# Patient Record
Sex: MALE | Race: WHITE | Employment: FULL TIME | URBAN - METROPOLITAN AREA
[De-identification: names, ages, dates, MRNs, and addresses within clinical notes are randomized per-mention and may not be internally consistent; named-entity substitution may affect disease eponyms.]

---

## 2018-09-29 ENCOUNTER — OFFICE VISIT (OUTPATIENT)
Dept: FAMILY MEDICINE CLINIC | Facility: CLINIC | Age: 38
End: 2018-09-29
Payer: COMMERCIAL

## 2018-09-29 ENCOUNTER — LAB ENCOUNTER (OUTPATIENT)
Dept: LAB | Age: 38
End: 2018-09-29
Attending: FAMILY MEDICINE
Payer: COMMERCIAL

## 2018-09-29 VITALS
RESPIRATION RATE: 16 BRPM | SYSTOLIC BLOOD PRESSURE: 108 MMHG | WEIGHT: 163 LBS | TEMPERATURE: 98 F | HEART RATE: 76 BPM | OXYGEN SATURATION: 99 % | DIASTOLIC BLOOD PRESSURE: 70 MMHG | HEIGHT: 67 IN | BODY MASS INDEX: 25.58 KG/M2

## 2018-09-29 DIAGNOSIS — Z71.85 VACCINE COUNSELING: ICD-10-CM

## 2018-09-29 DIAGNOSIS — Z13.220 LIPID SCREENING: ICD-10-CM

## 2018-09-29 DIAGNOSIS — Z76.89 ENCOUNTER TO ESTABLISH CARE: ICD-10-CM

## 2018-09-29 DIAGNOSIS — Z00.00 LABORATORY TESTS ORDERED AS PART OF A COMPLETE PHYSICAL EXAM (CPE): ICD-10-CM

## 2018-09-29 DIAGNOSIS — Z86.69 HX OF HYDROCEPHALUS: ICD-10-CM

## 2018-09-29 DIAGNOSIS — Z00.00 WELL ADULT EXAM: Primary | ICD-10-CM

## 2018-09-29 DIAGNOSIS — Z71.82 EXERCISE COUNSELING: ICD-10-CM

## 2018-09-29 DIAGNOSIS — Z84.0: ICD-10-CM

## 2018-09-29 DIAGNOSIS — Z13.39 ALCOHOL SCREENING: ICD-10-CM

## 2018-09-29 DIAGNOSIS — Z80.8 FAMILY HISTORY OF SKIN CANCER: ICD-10-CM

## 2018-09-29 PROCEDURE — 85025 COMPLETE CBC W/AUTO DIFF WBC: CPT | Performed by: FAMILY MEDICINE

## 2018-09-29 PROCEDURE — 80061 LIPID PANEL: CPT | Performed by: FAMILY MEDICINE

## 2018-09-29 PROCEDURE — 36415 COLL VENOUS BLD VENIPUNCTURE: CPT | Performed by: FAMILY MEDICINE

## 2018-09-29 PROCEDURE — 99385 PREV VISIT NEW AGE 18-39: CPT | Performed by: FAMILY MEDICINE

## 2018-09-29 PROCEDURE — 80053 COMPREHEN METABOLIC PANEL: CPT | Performed by: FAMILY MEDICINE

## 2018-09-29 RX ORDER — CHLORAL HYDRATE 500 MG
1000 CAPSULE ORAL DAILY
COMMUNITY

## 2018-09-29 NOTE — PROGRESS NOTES
Patient presents with:  Complete Form: complete boimetric screening form, pt had flu shot already at work  [address]      HPI:  New patient here to establish care. No acute complaints today     PMHx: hx of hydrocephalus diagnosed at 5 year of age.  He IMPLANT SHUNT      Comment:  HYDROCEPHALUS SHUNTED TWICE  1996: RECONSTR NOSE  Family History   Problem Relation Age of Onset   • No Known Problems Father    • No Known Problems Mother    • Cancer Maternal Grandmother         SKIN CANCER   • Alcohol and Ot normal. Non tender, no masses, no organomegaly or hernias. Musculoskeletal: Normal range of motion. No edema and no tenderness. No effusions. Lymphadenopathy: No cervical adenopathy. Neurological: Normal reflexes.  No cranial nerve deficit or sensory d

## 2019-10-01 ENCOUNTER — LAB ENCOUNTER (OUTPATIENT)
Dept: LAB | Age: 39
End: 2019-10-01
Attending: FAMILY MEDICINE
Payer: COMMERCIAL

## 2019-10-01 ENCOUNTER — OFFICE VISIT (OUTPATIENT)
Dept: FAMILY MEDICINE CLINIC | Facility: CLINIC | Age: 39
End: 2019-10-01
Payer: COMMERCIAL

## 2019-10-01 VITALS
DIASTOLIC BLOOD PRESSURE: 76 MMHG | RESPIRATION RATE: 16 BRPM | SYSTOLIC BLOOD PRESSURE: 118 MMHG | OXYGEN SATURATION: 99 % | BODY MASS INDEX: 26.37 KG/M2 | WEIGHT: 168 LBS | HEIGHT: 67 IN | TEMPERATURE: 98 F | HEART RATE: 82 BPM

## 2019-10-01 DIAGNOSIS — M77.12 LATERAL EPICONDYLITIS OF LEFT ELBOW: ICD-10-CM

## 2019-10-01 DIAGNOSIS — Z13.31 DEPRESSION SCREENING: ICD-10-CM

## 2019-10-01 DIAGNOSIS — Z00.00 ROUTINE GENERAL MEDICAL EXAMINATION AT A HEALTH CARE FACILITY: ICD-10-CM

## 2019-10-01 DIAGNOSIS — Z71.82 EXERCISE COUNSELING: ICD-10-CM

## 2019-10-01 DIAGNOSIS — Z00.00 WELL ADULT EXAM: Primary | ICD-10-CM

## 2019-10-01 DIAGNOSIS — H61.22 LEFT EAR IMPACTED CERUMEN: ICD-10-CM

## 2019-10-01 PROCEDURE — 99395 PREV VISIT EST AGE 18-39: CPT | Performed by: FAMILY MEDICINE

## 2019-10-01 PROCEDURE — 36415 COLL VENOUS BLD VENIPUNCTURE: CPT | Performed by: FAMILY MEDICINE

## 2019-10-01 PROCEDURE — 99213 OFFICE O/P EST LOW 20 MIN: CPT | Performed by: FAMILY MEDICINE

## 2019-10-01 PROCEDURE — 80061 LIPID PANEL: CPT | Performed by: FAMILY MEDICINE

## 2019-10-01 PROCEDURE — 85025 COMPLETE CBC W/AUTO DIFF WBC: CPT | Performed by: FAMILY MEDICINE

## 2019-10-01 PROCEDURE — 80053 COMPREHEN METABOLIC PANEL: CPT | Performed by: FAMILY MEDICINE

## 2019-10-01 NOTE — PROGRESS NOTES
Patient presents with:  Physical: Routine annual physical with fasting labs due  Complete Form: Biometric screening form  Imm/Inj: Tdap UTD and Flu shot completed on 9/10/19 at [employer]      HPI:    Sees dr. Cristian Savage for family hx of skin cancer.      Lef Hematological: Negative for adenopathy. Does not bruise/bleed easily. Psychiatric/Behavioral: The patient is not nervous/anxious. No depression.     Patient Active Problem List:     Hx of hydrocephalus     Family history of skin cancer      Past Medical H Constitutional: Oriented to person, place, and time. No distress. HEENT:  Normocephalic and atraumatic. Hearing and tympanic membranes normal of right ear. Left ear with cerumen impaction. Nose normal. Oropharynx is clear and moist.   Eyes: Conjunctivae - left epicondylitis: recommended RICE, application of Brace, rest, discussed other treatment options of PT, referral to ortho for possible steroid injection. Patient will try RICE, nsaids, brace. F/u in 2 weeks for irrigation and elbow assessment.    - as   All men in this age group At routine exams   Diabetes mellitus, type 2 All men who have no symptoms but are overweight or obese and have 1 or more other risk factors for diabetes At least every 3 years (yearly if blood sugar has already started to rise) Measles, mumps, rubella (MMR) All men in this age group who have no record of these infections or vaccines 1 or 2 doses through age 54   Meningococcal Men at increased risk for infection – talk with your healthcare provider 1 or more doses   Pneumococca (P Tendons are strong bands of tissue that connect muscles to bones. Lateral epicondylitis affects the tendons that connect muscles in the forearm to the lateral epicondyle. This is the bony knob on the outer side of the elbow.  The condition occurs if the ext · Taking pain medicines. Taking prescription or over-the-counter pain medicines may help reduce pain and swelling.    · Wearing a brace. This helps reduce strain on the muscles and tendons in the forearm, which may relieve symptoms.  It is very important to Wearing a tennis elbow splint allows the inflamed tendon to rest. It must be worn properly. It should be placed down the arm past the painful area of the elbow.  If it is directly over the inflamed tendon, it can worsen the symptoms. This brace can help the

## 2019-10-01 NOTE — PATIENT INSTRUCTIONS
Prevention Guidelines, Men Ages 25 to 44  Screening tests and vaccines are an important part of managing your health. A screening test is done to find possible disorders or diseases in people who don't have any symptoms.  The goal is to find a disease ear Vaccines Who needs it How often   Chickenpox (varicella) All men in this age group who have no record of this infection or vaccine 2 doses; the second dose should be given at least 4 weeks after the first dose   Hepatitis A Men at increased risk for infect Sexually transmitted infection prevention Men who are sexually active At routine exams   Skin cancer Prevention of skin cancer in fair-skinned adults through age 25 At routine exams   1Those who are 25years of age, who are not up-to-date on their childhoo · Resting the elbow, forearm, and wrist. You’ll need to avoid movements that can make your symptoms worse. You also may need to avoid certain sports and types of work for a time. This helps relieve symptoms and prevent further damage to the tendons.   · Ezzard Blowers © 4850-7982 [employer] [address]. [address] [facility] [place] [place]. All rights reserved. This information is not intended as a substitute for professional medical care. Always follow your healthcare professional's instructions.         Luis

## 2019-10-15 ENCOUNTER — OFFICE VISIT (OUTPATIENT)
Dept: FAMILY MEDICINE CLINIC | Facility: CLINIC | Age: 39
End: 2019-10-15
Payer: COMMERCIAL

## 2019-10-15 VITALS
SYSTOLIC BLOOD PRESSURE: 112 MMHG | HEART RATE: 65 BPM | TEMPERATURE: 98 F | OXYGEN SATURATION: 99 % | DIASTOLIC BLOOD PRESSURE: 64 MMHG | WEIGHT: 168 LBS | BODY MASS INDEX: 26.37 KG/M2 | HEIGHT: 67 IN | RESPIRATION RATE: 16 BRPM

## 2019-10-15 DIAGNOSIS — H61.23 BILATERAL IMPACTED CERUMEN: Primary | ICD-10-CM

## 2019-10-15 PROCEDURE — 69210 REMOVE IMPACTED EAR WAX UNI: CPT | Performed by: FAMILY MEDICINE

## 2019-10-15 NOTE — PROGRESS NOTES
Cerumen Impaction    Reason(s) for procedure:  Bilateral impacted cerumen  (primary encounter diagnosis)    Ear Exam:  Canal:  Right cerumen impaction     Tympanic Membrane:  Left cerumen impaction    Visualized by: otoscope     Impacted Cerumen:  Haley Ba

## 2020-10-13 ENCOUNTER — OFFICE VISIT (OUTPATIENT)
Dept: FAMILY MEDICINE CLINIC | Facility: CLINIC | Age: 40
End: 2020-10-13
Payer: COMMERCIAL

## 2020-10-13 ENCOUNTER — LABORATORY ENCOUNTER (OUTPATIENT)
Dept: LAB | Age: 40
End: 2020-10-13
Attending: FAMILY MEDICINE
Payer: COMMERCIAL

## 2020-10-13 VITALS
BODY MASS INDEX: 26.68 KG/M2 | WEIGHT: 170 LBS | OXYGEN SATURATION: 99 % | HEART RATE: 88 BPM | TEMPERATURE: 98 F | SYSTOLIC BLOOD PRESSURE: 124 MMHG | RESPIRATION RATE: 16 BRPM | HEIGHT: 67 IN | DIASTOLIC BLOOD PRESSURE: 74 MMHG

## 2020-10-13 DIAGNOSIS — Z00.00 LABORATORY EXAM ORDERED AS PART OF ROUTINE GENERAL MEDICAL EXAMINATION: ICD-10-CM

## 2020-10-13 DIAGNOSIS — Z86.69 HX OF HYDROCEPHALUS: ICD-10-CM

## 2020-10-13 DIAGNOSIS — Z80.8 FAMILY HISTORY OF SKIN CANCER: ICD-10-CM

## 2020-10-13 DIAGNOSIS — L91.0 KELOID: ICD-10-CM

## 2020-10-13 DIAGNOSIS — Z13.31 DEPRESSION SCREENING: ICD-10-CM

## 2020-10-13 DIAGNOSIS — Z71.82 EXERCISE COUNSELING: ICD-10-CM

## 2020-10-13 DIAGNOSIS — Z00.00 WELL ADULT EXAM: Primary | ICD-10-CM

## 2020-10-13 DIAGNOSIS — Z23 NEED FOR INFLUENZA VACCINATION: ICD-10-CM

## 2020-10-13 PROCEDURE — 90471 IMMUNIZATION ADMIN: CPT | Performed by: FAMILY MEDICINE

## 2020-10-13 PROCEDURE — 99395 PREV VISIT EST AGE 18-39: CPT | Performed by: FAMILY MEDICINE

## 2020-10-13 PROCEDURE — 80050 GENERAL HEALTH PANEL: CPT | Performed by: FAMILY MEDICINE

## 2020-10-13 PROCEDURE — 80061 LIPID PANEL: CPT | Performed by: FAMILY MEDICINE

## 2020-10-13 PROCEDURE — 3074F SYST BP LT 130 MM HG: CPT | Performed by: FAMILY MEDICINE

## 2020-10-13 PROCEDURE — 3078F DIAST BP <80 MM HG: CPT | Performed by: FAMILY MEDICINE

## 2020-10-13 PROCEDURE — 90686 IIV4 VACC NO PRSV 0.5 ML IM: CPT | Performed by: FAMILY MEDICINE

## 2020-10-13 PROCEDURE — 36415 COLL VENOUS BLD VENIPUNCTURE: CPT | Performed by: FAMILY MEDICINE

## 2020-10-13 PROCEDURE — 84439 ASSAY OF FREE THYROXINE: CPT | Performed by: FAMILY MEDICINE

## 2020-10-13 PROCEDURE — 3008F BODY MASS INDEX DOCD: CPT | Performed by: FAMILY MEDICINE

## 2020-10-13 NOTE — PROGRESS NOTES
Patient presents with:  Physical: Routine annual wellness exam.  Immunization/Injection: Flu shot- consent signed- Tdap was 1/1/2021      HPI:    Sees dr. Adeel Voss for family hx of skin cancer mother and sister had basal cell.  He had a biopsy of skin mole skin cancer     Lateral epicondylitis of left elbow     Left ear impacted cerumen      Past Medical History:   Diagnosis Date   • Hydrocephalus ([facility] [address] 1983     Past Surgical History:   Procedure Laterality Date   • HC IMPLANT SHUNT      HYDROCEPHALUS SHUNTE normal. Oropharynx is clear and moist.   Eyes: Conjunctivae and EOM are normal. PERRLA. No scleral icterus. Neck: Normal range of motion. Neck supple. Normal carotid pulses and no JVD present. No mass and no thyromegaly present.    Cardiovascular: Normal 0, done 10/13/2020              Exercise counseling  Recommended 150 minutes of exercise per week.      Hx of hydrocephalus    Family history of skin cancer  - DERM - INTERNAL    Keloid  - DERM - INTERNAL      Orders Placed This Encounter      MARIA DE JESUS Harper increased risk At routine exams   High cholesterol or triglycerides All men ages 28 and older, and younger men at high risk for coronary artery disease At least every 5 years   HIV All men At routine exams   Obesity All men in this age group At routine exa dose ages 23 to 72 (protects against 13 types of pneumococcal bacteria)  PPSV23: 1 to 2 doses through age 59, or 1 dose at 72 or older (protects against 23 types of pneumococcal bacteria)    Tetanus/diphtheria/pertussis (Td/Tdap) booster All men in this ag

## 2021-07-06 ENCOUNTER — TELEPHONE (OUTPATIENT)
Dept: SURGERY | Facility: CLINIC | Age: 41
End: 2021-07-06

## 2021-07-06 NOTE — TELEPHONE ENCOUNTER
Patient is scheduled for NP Consult on 7/20 @ 8 am for Cervical cyst. Patient states his dermatologist is wanting him to be seen by Neurosurgeon prior to having cyst removed due to cyst being located on shunt patient had placed as a child.  Patient would li

## 2021-07-07 NOTE — TELEPHONE ENCOUNTER
[facility] dermatology Dr Koki Mclean. Called patient and informed him of message below. Cancelled appointment for 7-20.  He requested we fax something his dermatologist/plastic surgeon Dr Koki Mclean with [facility] stating he needs a CT of head and neck to be reviewed

## 2021-07-07 NOTE — TELEPHONE ENCOUNTER
Discussed with Dr. Peyton Lorenzana. Per Dr. Peyton Lorenzana, no need for office visit. Recommends the dermatologist order a CT scan of the head and cervical spine. Dr. Peyton Lorenzana will review once completed.   If the cyst is away from the shunt catheter, Dr. Peyton Lorenzana to provide valente

## 2021-07-21 NOTE — TELEPHONE ENCOUNTER
Patient calling in stating the dermatologist he is seeing did not receive the letter that was sent on 7/7/21. Patient is requesting Anais Schulte or nurse reach out to the office, [facility], directly at [phone].

## 2021-07-21 NOTE — TELEPHONE ENCOUNTER
Called Dr. Nicholas Bain office, with provided number, to inform of letter content and recommendations: that pt to be ordered CT of head and neck by Dr. Keira Salinas and once completed Dr. Matty Soto will review and give clearance based on results.      Was informed by mary ellen

## 2021-08-28 ENCOUNTER — HOSPITAL ENCOUNTER (OUTPATIENT)
Dept: CT IMAGING | Age: 41
Discharge: HOME | End: 2021-08-28
Attending: SURGERY
Payer: COMMERCIAL

## 2021-08-28 DIAGNOSIS — L72.8 OTHER FOLLICULAR CYSTS OF THE SKIN AND SUBCUTANEOUS TISSUE: ICD-10-CM

## 2021-08-28 DIAGNOSIS — T85.09XA: ICD-10-CM

## 2021-08-28 LAB — CREAT BLD-MCNC: 1.1 MG/DL

## 2021-08-28 PROCEDURE — 70470 CT HEAD/BRAIN W/O & W/DYE: CPT | Performed by: SURGERY

## 2021-08-28 PROCEDURE — 70492 CT SFT TSUE NCK W/O & W/DYE: CPT | Performed by: SURGERY

## 2021-08-28 PROCEDURE — 82565 ASSAY OF CREATININE: CPT

## 2021-09-03 NOTE — TELEPHONE ENCOUNTER
Pt called to let Dr. Ferny Booker know he has completed the CT scans and they are in his chart ready for review. Please see TE below.

## 2021-09-15 NOTE — TELEPHONE ENCOUNTER
Dr Solomon Ojeda would like to speak to Dr Herminia Ulloa by Friday.   He has some questions re: patient.  [phone]

## 2021-09-20 ENCOUNTER — TELEMEDICINE (OUTPATIENT)
Dept: FAMILY MEDICINE CLINIC | Facility: CLINIC | Age: 41
End: 2021-09-20

## 2021-09-20 DIAGNOSIS — J06.9 VIRAL URI WITH COUGH: ICD-10-CM

## 2021-09-20 DIAGNOSIS — J40 BRONCHITIS: Primary | ICD-10-CM

## 2021-09-20 PROCEDURE — 99213 OFFICE O/P EST LOW 20 MIN: CPT | Performed by: FAMILY MEDICINE

## 2021-09-20 RX ORDER — METHYLPREDNISOLONE 4 MG/1
TABLET ORAL
Qty: 21 EACH | Refills: 0 | Status: SHIPPED | OUTPATIENT
Start: 2021-09-20 | End: 2021-10-07

## 2021-09-20 NOTE — PROGRESS NOTES
Subjective    This visit is conducted using Telemedicine with live, interactive video and audio.     Chief Complaint:  Patient presents with:  Cough        The patient confirmed knowledge of the limitations of the use of telemedicine were verbally confirm no       HISTORY:  Past Medical History:   Diagnosis Date   • Hydrocephalus ([facility] [address] 1983      Past Surgical History:   Procedure Laterality Date   • HC IMPLANT SHUNT      HYDROCEPHALUS SHUNTED TWICE   • RECONSTR NOSE  1996      Family History   Problem Relat 4 MG Oral Tablet Therapy Pack; As directed. Dispense: 21 each; Refill: 0    Diagnostic rationale, follow up instructions, and strict precautions/indications for emergent direct evaluation were discussed with the patient.  The patient agrees with the plan,

## 2021-09-21 NOTE — PATIENT INSTRUCTIONS
Acute Bronchitis  Your healthcare provider has told you that you have acute bronchitis. Bronchitis is infection or inflammation of the airways in the lungs (bronchial tubes). Normally, air moves easily in and out of the airways.  Bronchitis narrows the ai help relieve inflammation in your bronchial tubes. They can also thin mucus. This makes it easier to cough up. Your healthcare provider may prescribe an inhaler to help open up the bronchial tubes.  Most of the time, acute bronchitis is caused by a viral in symptoms  · Breathing not getting better with treatments  · Symptoms that don’t start to get better in 1 week  Claudio last reviewed this educational content on 8/1/2019 © 2000-2021 [employer] [address]. All rights reserved.  This information is not

## 2021-09-28 NOTE — TELEPHONE ENCOUNTER
Pt calling again to find out Dr Miguelito Harris recommendations re: cyst removal.  Pt is frustrated he hasn't heard back yet.  Pls call pt to advise

## 2021-09-29 NOTE — TELEPHONE ENCOUNTER
Dr. Tere Telles will need to review imaging and call patient with results if he can proceed with procedure with dermatology. STEVE nursing, please provider remind to Dr. Tere Telles when he is in clinic tomorrow. Will route high priority to Dr. Tere Telles.

## 2021-09-29 NOTE — TELEPHONE ENCOUNTER
S:  Received a call from Dr. Junior Cisse [facility] stating patient would like an appointment with Dr. Padmaja Beebe, not a PA, to go over films of CT. PT hoping by having Moisés Chow call if will help get him seen sooner by Dr. Padmaja Beebe.       B: Dr. Padmaja Beebe cons

## 2021-09-29 NOTE — TELEPHONE ENCOUNTER
Per Romel Limon., APN  \"Attempted to call patient but went to voice mail.  If patient calls back, please let him know Dr. Harjit Hyman is not in the office today. Aris Isaacs is back tomorrow so will discuss and call the patient then. \"

## 2021-09-30 NOTE — TELEPHONE ENCOUNTER
RN from Dr Melody Albarran office calling again requesting an update on clearance for derm procedure. Relayed messages below, that Dr Sulma Samano is aware and will review the imaging today while in clinic. We will call them once we get a response.        If staff reaches

## 2021-10-04 NOTE — TELEPHONE ENCOUNTER
Mr. Melodie Dunne calling for an update regarding information below. Informed Soco Otto, as of this moment we do not have any new or additional information. Pt is upset and would like to speak to management regarding this situation, prefers to speak to them now.

## 2021-10-05 ENCOUNTER — TELEPHONE (OUTPATIENT)
Dept: SURGERY | Facility: CLINIC | Age: 41
End: 2021-10-05

## 2021-10-05 DIAGNOSIS — Z98.2 S/P VP SHUNT: Primary | ICD-10-CM

## 2021-10-05 DIAGNOSIS — L72.9 FOLLICULAR CYST OF THE SKIN AND SUBCUTANEOUS TISSUE, UNSPECIFIED: ICD-10-CM

## 2021-10-05 NOTE — TELEPHONE ENCOUNTER
Pt's mother calling. She states they were just on phone with Dr Saul Salinas and he asked pt to do something and now he is feeling awful. They would like to speak to Dr Saul Salinas again, if possible.   Pls call [phone]

## 2021-10-05 NOTE — TELEPHONE ENCOUNTER
Informed Dr. Sarai Keith of update. Per Dr. Sarai Keith, Nursing to call patient and offer an appointment to be seen this Thursday, 10/7/21 at [facility] at 0900. Called patient who agreed to that date and time for an OV.   Informed patient that STEVE PSR will call tomorrow t

## 2021-10-05 NOTE — TELEPHONE ENCOUNTER
Neurological Surgery  Patient Phone Call    Khoa Zeng  10/5/2021    Diagnosis: Right sternocleidomastoid area cyst.  History of multiple  shunts during childhood.     Chief complaint: Relapsing right neck subcutaneous cyst.    History: Patient w and neck show multiple shunt catheters as described in the radiology report. I do not have a formal shunt x-ray series to be certain of which shunts are discontinuous but he has 4 catheters in his head.   A left parietal subdural catheter appears to be probably require exploration with neurosurgery and plastic surgery combined. Plan: I have ordered a formal shunt x-ray series to see his catheters more clearly than on the CT scan. I will see him in the office.   I have asked the plastic surgeon Dr. Mirtha Hudson

## 2021-10-05 NOTE — TELEPHONE ENCOUNTER
S:  Patient's inquiry was noted. B:  Dr. Conrad Gonzalez conducted a virtual visit with patient today.   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 Conrad Gonzalez who asked Nursing

## 2021-10-06 ENCOUNTER — HOSPITAL ENCOUNTER (OUTPATIENT)
Dept: GENERAL RADIOLOGY | Age: 41
Discharge: HOME | End: 2021-10-06
Attending: NEUROLOGICAL SURGERY
Payer: COMMERCIAL

## 2021-10-06 ENCOUNTER — TELEPHONE (OUTPATIENT)
Dept: SURGERY | Facility: CLINIC | Age: 41
End: 2021-10-06

## 2021-10-06 DIAGNOSIS — Z98.2 S/P VP SHUNT: ICD-10-CM

## 2021-10-06 DIAGNOSIS — L72.9 FOLLICULAR CYST OF THE SKIN AND SUBCUTANEOUS TISSUE, UNSPECIFIED: ICD-10-CM

## 2021-10-06 PROCEDURE — 70360 X-RAY EXAM OF NECK: CPT | Performed by: NEUROLOGICAL SURGERY

## 2021-10-06 PROCEDURE — 70250 X-RAY EXAM OF SKULL: CPT | Performed by: NEUROLOGICAL SURGERY

## 2021-10-06 PROCEDURE — 74018 RADEX ABDOMEN 1 VIEW: CPT | Performed by: NEUROLOGICAL SURGERY

## 2021-10-06 PROCEDURE — 71045 X-RAY EXAM CHEST 1 VIEW: CPT | Performed by: NEUROLOGICAL SURGERY

## 2021-10-06 NOTE — TELEPHONE ENCOUNTER
Pt states he had xray completed this morning and he received the results via [employer]. He would like some assistance in understanding the results.   Pls call pt to discuss/advise

## 2021-10-06 NOTE — TELEPHONE ENCOUNTER
Confirmed appointment for tomorrow 10/7 with Dr Saul Salinas at [facility].  Pt confirmed.

## 2021-10-07 ENCOUNTER — PATIENT MESSAGE (OUTPATIENT)
Dept: SURGERY | Facility: CLINIC | Age: 41
End: 2021-10-07

## 2021-10-07 ENCOUNTER — OFFICE VISIT (OUTPATIENT)
Dept: SURGERY | Facility: CLINIC | Age: 41
End: 2021-10-07
Payer: COMMERCIAL

## 2021-10-07 VITALS
SYSTOLIC BLOOD PRESSURE: 108 MMHG | WEIGHT: 170 LBS | BODY MASS INDEX: 26.68 KG/M2 | DIASTOLIC BLOOD PRESSURE: 66 MMHG | HEIGHT: 67 IN

## 2021-10-07 DIAGNOSIS — L72.9 FOLLICULAR CYST OF THE SKIN AND SUBCUTANEOUS TISSUE, UNSPECIFIED: ICD-10-CM

## 2021-10-07 DIAGNOSIS — Z86.69 HX OF HYDROCEPHALUS: ICD-10-CM

## 2021-10-07 DIAGNOSIS — Z98.2 S/P VP SHUNT: Primary | ICD-10-CM

## 2021-10-07 PROCEDURE — 3008F BODY MASS INDEX DOCD: CPT | Performed by: NEUROLOGICAL SURGERY

## 2021-10-07 PROCEDURE — 3074F SYST BP LT 130 MM HG: CPT | Performed by: NEUROLOGICAL SURGERY

## 2021-10-07 PROCEDURE — 99204 OFFICE O/P NEW MOD 45 MIN: CPT | Performed by: NEUROLOGICAL SURGERY

## 2021-10-07 PROCEDURE — 3078F DIAST BP <80 MM HG: CPT | Performed by: NEUROLOGICAL SURGERY

## 2021-10-07 NOTE — TELEPHONE ENCOUNTER
Pt messaging wanting clarification on plan of care that was established today at his appointment. Provider has not completed note and is seeing patients.        Will check back on status of office note at later time    Messaged pt back to inform

## 2021-10-07 NOTE — PROGRESS NOTES
Neurosurgery Consultation      REASON FOR CONSULT: History of multiple shunts since childhood. Development of a follicular cyst in the neck over one of the shunt tubes which has been chronically ulcerating.       HISTORY OF PRESENT ILLNESS: Sameer Catherine resection of the cyst.    Review of CT scan shows 4 separate intracranial catheters. 2 of them extend down the right neck into the region of the cystic area in the sternocleidomastoid muscle. One of the catheters at this location is discontinuous.   The o not refill promptly at all. Several minutes later during further questioning and exam it seems to feel again but then fails to resolve promptly.   In the right sternocleidomastoid area he has what looks almost like a mole and palpable thick tissue subcutan without contrast  August 28, 2021 CT scan soft tissue neck with and without contrast  October 6, 2021 shunt x-ray series  Based on CT scan and plain film review there are 4 intracranial catheters.   For review purposes I have numbered them in the order of a disconnected catheter and then down to the abdomen.   The system on the right side which has a pumping device that does not seem to empty and refill properly gives him a sense of tingling in the right sternocleidomastoid cyst in question, but this flushing

## 2021-10-07 NOTE — TELEPHONE ENCOUNTER
From: Alexus Busch  To: Cony Mendes MD  Sent: 10/7/2021 11:40 AM CDT  Subject: Follow up around 11/4    Hi Dr. Tere Escalante. Thank you for seeing me today. I’m reading the follow up visit details.  Does this 11/4 mean this will be right around when judy

## 2021-10-07 NOTE — PROGRESS NOTES
Had some xrays, thinks there might be cracked or broken    No pressure in head  Feels ok with balance has been running a couple of times per week    No falls recently    When after he spoke with you and he was pressing on the area, and hung up with you, he

## 2021-10-07 NOTE — PATIENT INSTRUCTIONS
Refill policies:    • Allow 2-3 business days for refills; controlled substances may take longer.   • Contact your pharmacy at least 5 days prior to running out of medication and have them send an electronic request or submit request through the “request re Depending on your insurance carrier, approval may take 3-10 days. It is highly recommended patients contact their insurance carrier directly to determine coverage.   If test is done without insurance authorization, patient may be responsible for the entire resection of skin follicular cyst.

## 2021-10-11 ENCOUNTER — TELEPHONE (OUTPATIENT)
Dept: SURGERY | Facility: CLINIC | Age: 41
End: 2021-10-11

## 2021-10-11 DIAGNOSIS — Z98.2 S/P VP SHUNT: Primary | ICD-10-CM

## 2021-10-11 NOTE — TELEPHONE ENCOUNTER
Pina Hinson from Dr Julieth Berman office calling to state she will put a hold on Dr Julieth Berman schedule for 11.8. 21.   Pls call Pina Hinson with any questions

## 2021-10-11 NOTE — TELEPHONE ENCOUNTER
Spoke to Cheo at Dr. Eileen Lee office and informed them we can place a hold date on Nov 8 for the joint case. I informed them we will confirm this date after the consult with Dr. Nagi White on 10/20.

## 2021-10-11 NOTE — TELEPHONE ENCOUNTER
Pt to receive surgery with Dr. Slava Linder     RN to coordinate sx date with Dr. Darci Chairez who will be on case    Once date given:  will request sx orders from provider   Confirm date with pt  Go over sx instructions     Pt aware of plan of care.      Called Dr. Abdon Page

## 2021-10-11 NOTE — TELEPHONE ENCOUNTER
Pt messaging via [facility] stating     \"Paco Resendez,     I didn’t hear back yet. Any word from Dr. Matty Soto? Also could you please let him know Geo Arita been having mild headaches since Tuesday. One from yesterday into this morning that was particularly bad.  Is that a si

## 2021-10-13 NOTE — TELEPHONE ENCOUNTER
Called and LM on personalized VM informing pt we are in the process of coordinating surgery with Dr. Janae Alcaraz. Nursing will call to discuss surgery instructions as soon as we have additional input. For future messages, see TE Schedule surgery.

## 2021-10-13 NOTE — TELEPHONE ENCOUNTER
Dr. James Sharif placed hold for 11/8/21 surgical date. Pt seeing Dr. Yuriy Saldaña on 10/20/21.     Will add to Dr. Josiah Marcelo list

## 2021-10-15 ENCOUNTER — OFFICE VISIT (OUTPATIENT)
Dept: FAMILY MEDICINE CLINIC | Facility: CLINIC | Age: 41
End: 2021-10-15
Payer: COMMERCIAL

## 2021-10-15 ENCOUNTER — LAB ENCOUNTER (OUTPATIENT)
Dept: LAB | Age: 41
End: 2021-10-15
Attending: FAMILY MEDICINE
Payer: COMMERCIAL

## 2021-10-15 VITALS
DIASTOLIC BLOOD PRESSURE: 64 MMHG | RESPIRATION RATE: 16 BRPM | HEIGHT: 66.5 IN | HEART RATE: 85 BPM | BODY MASS INDEX: 26.2 KG/M2 | SYSTOLIC BLOOD PRESSURE: 110 MMHG | TEMPERATURE: 98 F | OXYGEN SATURATION: 99 % | WEIGHT: 165 LBS

## 2021-10-15 DIAGNOSIS — F41.8 ANXIETY ABOUT HEALTH: ICD-10-CM

## 2021-10-15 DIAGNOSIS — T85.618S SHUNT MALFUNCTION, SEQUELA: ICD-10-CM

## 2021-10-15 DIAGNOSIS — Z23 NEED FOR VACCINATION: ICD-10-CM

## 2021-10-15 DIAGNOSIS — Z00.00 WELL ADULT EXAM: Primary | ICD-10-CM

## 2021-10-15 DIAGNOSIS — Z80.8 FAMILY HISTORY OF SKIN CANCER: ICD-10-CM

## 2021-10-15 DIAGNOSIS — Z00.00 WELL ADULT EXAM: ICD-10-CM

## 2021-10-15 DIAGNOSIS — Z86.69 HX OF HYDROCEPHALUS: ICD-10-CM

## 2021-10-15 PROCEDURE — 80050 GENERAL HEALTH PANEL: CPT | Performed by: FAMILY MEDICINE

## 2021-10-15 PROCEDURE — 99395 PREV VISIT EST AGE 18-39: CPT | Performed by: FAMILY MEDICINE

## 2021-10-15 PROCEDURE — 3074F SYST BP LT 130 MM HG: CPT | Performed by: FAMILY MEDICINE

## 2021-10-15 PROCEDURE — 3008F BODY MASS INDEX DOCD: CPT | Performed by: FAMILY MEDICINE

## 2021-10-15 PROCEDURE — 99213 OFFICE O/P EST LOW 20 MIN: CPT | Performed by: FAMILY MEDICINE

## 2021-10-15 PROCEDURE — 80061 LIPID PANEL: CPT | Performed by: FAMILY MEDICINE

## 2021-10-15 PROCEDURE — 3078F DIAST BP <80 MM HG: CPT | Performed by: FAMILY MEDICINE

## 2021-10-15 PROCEDURE — 90471 IMMUNIZATION ADMIN: CPT | Performed by: FAMILY MEDICINE

## 2021-10-15 PROCEDURE — 90686 IIV4 VACC NO PRSV 0.5 ML IM: CPT | Performed by: FAMILY MEDICINE

## 2021-10-15 RX ORDER — ALPRAZOLAM 0.5 MG/1
0.5 TABLET ORAL NIGHTLY PRN
Qty: 15 TABLET | Refills: 0 | Status: SHIPPED | OUTPATIENT
Start: 2021-10-15 | End: 2021-10-30

## 2021-10-15 NOTE — PROGRESS NOTES
Patient presents with: Annual: Routine physical, labs pended to [facility].  PHQ:0, CSSR:0  Immunization/Injection: Signed consent flu shot, immunizations UTD      HPI:   Here for physical.        Sees dr. Krystle Puentes for family hx of skin cancer mother and sis Skin:+ skin dimple right neck   Neurological: Negative for dizziness, syncope, weakness, numbness, tingling and headaches. Hematological: Negative for adenopathy. Does not bruise/bleed easily. Psychiatric/Behavioral: The patient is  nervous/anxious. (62033)                          10/13/2020  10/15/2021      FLUZONE 6 months and older PFS 0.5 ml (18792)                          09/16/2016  09/10/2019  10/13/2020      Fluarix 6 Months And Older 0.5 ml prefilled syringe (76712) benefits of flu vaccine   - IMMUNIZATION ADMINISTRATION  - FLULAVAL INFLUENZA VACCINE QUAD PRESERVATIVE FREE 0.5 ML    2. Well adult exam  - -Discussed   diet and exercise,  vaccine and screening guidelines. - CBC WITH DIFFERENTIAL WITH PLATELET;  Future it early enough to treat it most effectively. Screening tests are not used to diagnose. Instead, they are used to decide if more testing is needed. Health counseling is essential, too. Below are guidelines for these, for men ages 25 to 44.  Talk with your h first dose; the third dose should be given at least 2 months after the second dose and at least 4 months after the first dose    Haemophilus influenzae Type B (HIB) Men at increased risk for infection – talk with your healthcare provider 1 to 3 doses   Hum intended as a substitute for professional medical care. Always follow your healthcare professional's instructions. Anxiety Reaction  Anxiety is the feeling we all get when we think something bad might happen.  It is a normal response to stress and no can't be avoided. It is necessary to learn how to better manage stress. There are many proven methods that will reduce your anxiety.  These include simple things such as exercise, good nutrition, and adequate rest. Also, there are certain techniques that ar

## 2021-10-17 PROBLEM — T85.618A SHUNT MALFUNCTION: Status: ACTIVE | Noted: 2021-10-17

## 2021-10-17 PROBLEM — F41.8 ANXIETY ABOUT HEALTH: Status: ACTIVE | Noted: 2021-10-17

## 2021-10-17 PROBLEM — R45.89 ANXIETY ABOUT HEALTH: Status: ACTIVE | Noted: 2021-10-17

## 2021-10-17 NOTE — PATIENT INSTRUCTIONS
Prevention Guidelines, Men Ages 25 to 44  Screening tests and vaccines are an important part of managing your health. A screening test is done to find possible disorders or diseases in people who don't have any symptoms.  The goal is to find a disease ear vaccine 2 doses; the second dose should be given at least 4 weeks after the first dose   Hepatitis A Men at increased risk for infection – talk with your healthcare provider 2 doses given at least 6 months apart   Hepatitis B Men at increased risk for infe be reminded to avoid intentional tanning and tanning beds. 1Those who are 25years of age, who are not up-to-date on their childhood immunizations, should get all appropriate catch-up vaccines recommended by the CDC.    Claudio last reviewed this educat how your body reacts to stress. Learn to listen to your body signals. This will help you take action before the stress becomes severe. · When you can, do something about the source of your stress.  (Avoid hassles, limit the amount of change that happens in

## 2021-10-20 ENCOUNTER — PATIENT MESSAGE (OUTPATIENT)
Dept: FAMILY MEDICINE CLINIC | Facility: CLINIC | Age: 41
End: 2021-10-20

## 2021-10-20 ENCOUNTER — OFFICE VISIT (OUTPATIENT)
Dept: SURGERY | Facility: CLINIC | Age: 41
End: 2021-10-20
Payer: COMMERCIAL

## 2021-10-20 ENCOUNTER — TELEPHONE (OUTPATIENT)
Dept: SURGERY | Facility: CLINIC | Age: 41
End: 2021-10-20

## 2021-10-20 DIAGNOSIS — L90.5 SCAR OF NECK: Primary | ICD-10-CM

## 2021-10-20 DIAGNOSIS — T85.618A SHUNT MALFUNCTION, INITIAL ENCOUNTER: ICD-10-CM

## 2021-10-20 DIAGNOSIS — Z98.2 S/P VP SHUNT: Primary | ICD-10-CM

## 2021-10-20 DIAGNOSIS — L98.9 SKIN LESION OF NECK: ICD-10-CM

## 2021-10-20 PROCEDURE — 99243 OFF/OP CNSLTJ NEW/EST LOW 30: CPT | Performed by: SURGERY

## 2021-10-20 NOTE — TELEPHONE ENCOUNTER
Patient is scheduled for Ventriculoperitoneal Shunt Revision Distal Catheter only at Right Anterior Cervical Region  on 11/8/21 with Dr Christian Hassan and Dr. Kami Abel.     Y  form completed  Y Surgery order signed   Aleah Kent on surgery sheet  Y Placed on out

## 2021-10-20 NOTE — TELEPHONE ENCOUNTER
From: Paul Buckley  To: Ekaterina Rodriguez DO  Sent: 10/20/2021 10:18 AM CDT  Subject: Clearance for surgery    Hi Dr. Josemanuel Charles,     I just met with the plastic surgeon who would be handling the removal of the cyst on my neck.  Her PA, BODØ said Tomasa

## 2021-10-20 NOTE — TELEPHONE ENCOUNTER
Per pt PCP Dr. Delonte Deras 10/15/21    \"4. Shunt malfunction, sequela  - expected to have surgery in 2 weeks. Clinically stable.  \"

## 2021-10-20 NOTE — CONSULTS
New Patient Consultation    Chief Complaint:  No chief complaint on file.   R neck scar/ cyst    History of Present Illness:   Debra Villanueva is a 46year old male referred by Dr. Sarai Vázquez for combined surgery for his R neck cyst/ scar in the setting of s Use      Smoking status: Never Smoker      Smokeless tobacco: Never Used    Vaping Use      Vaping Use: Never used    Substance and Sexual Activity      Alcohol use: Yes      Drug use: No      Sexual activity: Yes        Partners: Female    Other Topics discharge. Skin:   The patient denies rash, itching, skin lesions, dry skin, change in skin color or change in moles, sunburns, or sunburns with blistering.    Hematologic/Lymphatic:  The patient denies easily bruising or bleeding, persistent swollen gland the scar tissue and excised the cyst on the outside.   I also discussed with him that we will take great care to protect the marginal mandibular nerve and therefore will use intraoperative nerve monitoring with the checkpoint nerve stimulator and if availab

## 2021-10-20 NOTE — PATIENT INSTRUCTIONS
Surgeon:              Dr. Min Morris. Amelia Whiteside, PhD                                          Tel:         [phone]                                  Fax:        [phone]    Surgery/Procedure:      Excision of right neck cyst, scar  Joint with Dr. Arjun Ferguson, 2. surgery.     Consent obtained for skin tumor extirpation  Photos taken on 10/20/2021

## 2021-10-20 NOTE — TELEPHONE ENCOUNTER
Labs completed 10/15/21  Patient had physical 10/15/21  Does patient need preop appt?  Please advise

## 2021-10-20 NOTE — TELEPHONE ENCOUNTER
You are scheduled for a  shunt revision on 11/8/21 with Dr. Tere Escalante and Dr. Temi Rosales. · You will need to contact the Pre-admission department at [phone] to schedule your pre-op testing.  Blood work needs to be done within 30 days of surgery and a COV week post op appointment is scheduled for 11/22/21 at 2:30pm with Sherryle Range, PA.     · Your 6 week post op appointment is scheduled for 12/30/21 at 2:45pm with Sherryle Range, PA (Dr. Keisha Mehta will be in on this visit)     Please visit the following website for

## 2021-10-21 DIAGNOSIS — L90.5 SCAR OF NECK: Primary | ICD-10-CM

## 2021-10-21 NOTE — TELEPHONE ENCOUNTER
[address] [facility]  [phone] spoke with Olivia Peres Call reference #[id number].  Time on call 14:31    Prior authorization request completed for: YEMI Webb revision   Authorization #NO AUTHORIZATION NEEDED FOR OUTPATIENT  Authorization dates: 11/8/21  BRITNEY

## 2021-10-22 ENCOUNTER — PATIENT MESSAGE (OUTPATIENT)
Dept: FAMILY MEDICINE CLINIC | Facility: CLINIC | Age: 41
End: 2021-10-22

## 2021-10-22 NOTE — TELEPHONE ENCOUNTER
From: Víctor Mejia  To: Nba Aburto DO  Sent: 10/20/2021 10:18 AM CDT  Subject: Clearance for surgery    Hi Dr. Hema Zeng,     I just met with the plastic surgeon who would be handling the removal of the cyst on my neck.  Her PADanita said Tomasa

## 2021-10-22 NOTE — TELEPHONE ENCOUNTER
Future Appointments   Date Time Provider Yeimy Danii   10/29/2021  8:00 AM Jo Johns LCSW [facility]   10/29/2021  9:30 AM Fernando Dickson DO [facility] [address] [facility] [address]   11/22/2021  2:30 PM JOSIANE Murrieta2 [facility]

## 2021-10-25 ENCOUNTER — PATIENT MESSAGE (OUTPATIENT)
Dept: SURGERY | Facility: CLINIC | Age: 41
End: 2021-10-25

## 2021-10-25 NOTE — TELEPHONE ENCOUNTER
Received [facility] message from Pt inquiring about pre-op instructions     Called pt who wanted to know if the labs he had completed at his wellness check up with his PCP on 10/15/21 was acceptable. Inform labs are but will need additional labs.  Informed pt of ne

## 2021-10-26 NOTE — TELEPHONE ENCOUNTER
From: Moira Eng  Sent: 10/25/2021 1:25 PM CDT  To: Rosy Sanders 2 Nurse  Subject: Pre-operative instructions     Paco Resendez,    Could I please have a call back to go over these instructions again?  I was out running errands the day I got a call with t

## 2021-10-27 ENCOUNTER — LABORATORY ENCOUNTER (OUTPATIENT)
Dept: LAB | Age: 41
End: 2021-10-27
Attending: NEUROLOGICAL SURGERY
Payer: COMMERCIAL

## 2021-10-27 DIAGNOSIS — Z98.2 S/P VP SHUNT: ICD-10-CM

## 2021-10-27 PROCEDURE — 87081 CULTURE SCREEN ONLY: CPT | Performed by: NEUROLOGICAL SURGERY

## 2021-10-27 PROCEDURE — 36415 COLL VENOUS BLD VENIPUNCTURE: CPT | Performed by: NEUROLOGICAL SURGERY

## 2021-10-27 PROCEDURE — 85610 PROTHROMBIN TIME: CPT | Performed by: NEUROLOGICAL SURGERY

## 2021-10-27 PROCEDURE — 85730 THROMBOPLASTIN TIME PARTIAL: CPT | Performed by: NEUROLOGICAL SURGERY

## 2021-10-28 ENCOUNTER — TELEPHONE (OUTPATIENT)
Dept: SURGERY | Facility: CLINIC | Age: 41
End: 2021-10-28

## 2021-10-28 NOTE — TELEPHONE ENCOUNTER
Received disc of 151 pages of medical records from [facility]  was sent to medical records.

## 2021-10-29 ENCOUNTER — OFFICE VISIT (OUTPATIENT)
Dept: FAMILY MEDICINE CLINIC | Facility: CLINIC | Age: 41
End: 2021-10-29
Payer: COMMERCIAL

## 2021-10-29 VITALS
HEART RATE: 80 BPM | WEIGHT: 166.81 LBS | BODY MASS INDEX: 26.49 KG/M2 | SYSTOLIC BLOOD PRESSURE: 114 MMHG | HEIGHT: 66.5 IN | OXYGEN SATURATION: 98 % | TEMPERATURE: 98 F | DIASTOLIC BLOOD PRESSURE: 72 MMHG | RESPIRATION RATE: 16 BRPM

## 2021-10-29 DIAGNOSIS — Z01.818 PREOP EXAMINATION: Primary | ICD-10-CM

## 2021-10-29 DIAGNOSIS — H60.541 ACUTE ECZEMATOID OTITIS EXTERNA OF RIGHT EAR: ICD-10-CM

## 2021-10-29 PROCEDURE — 3078F DIAST BP <80 MM HG: CPT | Performed by: FAMILY MEDICINE

## 2021-10-29 PROCEDURE — 3074F SYST BP LT 130 MM HG: CPT | Performed by: FAMILY MEDICINE

## 2021-10-29 PROCEDURE — 3008F BODY MASS INDEX DOCD: CPT | Performed by: FAMILY MEDICINE

## 2021-10-29 PROCEDURE — 99243 OFF/OP CNSLTJ NEW/EST LOW 30: CPT | Performed by: FAMILY MEDICINE

## 2021-10-29 RX ORDER — NEOMYCIN SULFATE, POLYMYXIN B SULFATE, HYDROCORTISONE 3.5; 10000; 1 MG/ML; [USP'U]/ML; MG/ML
1 SOLUTION/ DROPS AURICULAR (OTIC) 3 TIMES DAILY
Qty: 10 ML | Refills: 0 | Status: SHIPPED | OUTPATIENT
Start: 2021-10-29 | End: 2021-11-05

## 2021-10-29 NOTE — PROGRESS NOTES
Lyn Dacosta is a 46year old male.  Patient presents with:  Pre-Op Exam: Outpatient shunt revision and removal of cyst on 11/8/2021    HPI:   Kareem Da Silva presents for preoperative evaluation and clearance for VENTRICULOPERITONEAL SHUNT REVISION REVISION DI Alcohol and Other Disorders Associated Maternal Grandfather    • Cancer Paternal Grandmother         SKIN CANCER   • Hypertension Paternal Grandfather    • Alcohol and Other Disorders Associated Paternal Grandfather    • Stroke Paternal Messi Roman 09/16/2016  09/10/2019  10/13/2020      Fluarix 6 Months And Older 0.5 ml prefilled syringe (99866)                          09/10/2019      Fluvirin, 3 Years & >, Im                          10/23/2014      Influenza             10/15/2014  10/08/2015  10 Deep venous thrombosis, pulmonary embolus, bleeding, myocardial infarction, failure to relieve pain, wound infection, wound dehiscence, anesthesia complications, arrhythmia's etc.    Angelica Comber, DO        This note was prepared using [address] [facility] v

## 2021-10-31 PROBLEM — H60.541 ACUTE ECZEMATOID OTITIS EXTERNA OF RIGHT EAR: Status: ACTIVE | Noted: 2021-10-31

## 2021-11-05 ENCOUNTER — LAB ENCOUNTER (OUTPATIENT)
Dept: LAB | Age: 41
End: 2021-11-05
Attending: NEUROLOGICAL SURGERY
Payer: COMMERCIAL

## 2021-11-05 DIAGNOSIS — Z98.2 S/P VP SHUNT: ICD-10-CM

## 2021-11-05 NOTE — TELEPHONE ENCOUNTER
Received a call from OR  who requested clarification of \"bed status\" post operatively. Confirmed with Noemi that patient will be admitted post op. Case Change Request placed for patient to be classified as admit to inpatient.

## 2021-11-07 ENCOUNTER — ANESTHESIA EVENT (OUTPATIENT)
Dept: SURGERY | Facility: HOSPITAL | Age: 41
End: 2021-11-07
Payer: COMMERCIAL

## 2021-11-08 ENCOUNTER — ANESTHESIA (OUTPATIENT)
Dept: SURGERY | Facility: HOSPITAL | Age: 41
End: 2021-11-08
Payer: COMMERCIAL

## 2021-11-08 ENCOUNTER — HOSPITAL ENCOUNTER (OUTPATIENT)
Facility: HOSPITAL | Age: 41
Setting detail: HOSPITAL OUTPATIENT SURGERY
Discharge: HOME | End: 2021-11-08
Attending: NEUROLOGICAL SURGERY | Admitting: NEUROLOGICAL SURGERY
Payer: COMMERCIAL

## 2021-11-08 VITALS
OXYGEN SATURATION: 99 % | WEIGHT: 168 LBS | SYSTOLIC BLOOD PRESSURE: 116 MMHG | HEIGHT: 66.5 IN | RESPIRATION RATE: 18 BRPM | TEMPERATURE: 98 F | BODY MASS INDEX: 26.68 KG/M2 | DIASTOLIC BLOOD PRESSURE: 68 MMHG | HEART RATE: 69 BPM

## 2021-11-08 DIAGNOSIS — T85.618A SHUNT MALFUNCTION, INITIAL ENCOUNTER: ICD-10-CM

## 2021-11-08 DIAGNOSIS — Z98.2 S/P VP SHUNT: ICD-10-CM

## 2021-11-08 DIAGNOSIS — L98.9 SKIN LESION OF NECK: ICD-10-CM

## 2021-11-08 PROCEDURE — 0JQ40ZZ REPAIR RIGHT NECK SUBCUTANEOUS TISSUE AND FASCIA, OPEN APPROACH: ICD-10-PCS | Performed by: SURGERY

## 2021-11-08 PROCEDURE — 87206 SMEAR FLUORESCENT/ACID STAI: CPT | Performed by: NEUROLOGICAL SURGERY

## 2021-11-08 PROCEDURE — 87116 MYCOBACTERIA CULTURE: CPT | Performed by: NEUROLOGICAL SURGERY

## 2021-11-08 PROCEDURE — 87070 CULTURE OTHR SPECIMN AEROBIC: CPT | Performed by: NEUROLOGICAL SURGERY

## 2021-11-08 PROCEDURE — 89050 BODY FLUID CELL COUNT: CPT | Performed by: NEUROLOGICAL SURGERY

## 2021-11-08 PROCEDURE — 87075 CULTR BACTERIA EXCEPT BLOOD: CPT | Performed by: NEUROLOGICAL SURGERY

## 2021-11-08 PROCEDURE — 87076 CULTURE ANAEROBE IDENT EACH: CPT | Performed by: NEUROLOGICAL SURGERY

## 2021-11-08 PROCEDURE — 0JWT0JZ REVISION OF SYNTHETIC SUBSTITUTE IN TRUNK SUBCUTANEOUS TISSUE AND FASCIA, OPEN APPROACH: ICD-10-PCS | Performed by: NEUROLOGICAL SURGERY

## 2021-11-08 PROCEDURE — 88304 TISSUE EXAM BY PATHOLOGIST: CPT | Performed by: NEUROLOGICAL SURGERY

## 2021-11-08 PROCEDURE — 87176 TISSUE HOMOGENIZATION CULTR: CPT | Performed by: NEUROLOGICAL SURGERY

## 2021-11-08 PROCEDURE — 87205 SMEAR GRAM STAIN: CPT | Performed by: NEUROLOGICAL SURGERY

## 2021-11-08 PROCEDURE — 88305 TISSUE EXAM BY PATHOLOGIST: CPT | Performed by: NEUROLOGICAL SURGERY

## 2021-11-08 PROCEDURE — 0JB40ZZ EXCISION OF RIGHT NECK SUBCUTANEOUS TISSUE AND FASCIA, OPEN APPROACH: ICD-10-PCS | Performed by: SURGERY

## 2021-11-08 PROCEDURE — 87102 FUNGUS ISOLATION CULTURE: CPT | Performed by: NEUROLOGICAL SURGERY

## 2021-11-08 PROCEDURE — 88300 SURGICAL PATH GROSS: CPT | Performed by: NEUROLOGICAL SURGERY

## 2021-11-08 DEVICE — CODMAN® BACTISEAL® VENTRICULAR CATHETER WITH BACTISEAL SHUNT SYSTEM
Type: IMPLANTABLE DEVICE | Site: NECK | Status: FUNCTIONAL
Brand: CODMAN® BACTISEAL®

## 2021-11-08 RX ORDER — ONDANSETRON 2 MG/ML
INJECTION INTRAMUSCULAR; INTRAVENOUS AS NEEDED
Status: DISCONTINUED | OUTPATIENT
Start: 2021-11-08 | End: 2021-11-08 | Stop reason: SURG

## 2021-11-08 RX ORDER — DEXAMETHASONE SODIUM PHOSPHATE 4 MG/ML
VIAL (ML) INJECTION AS NEEDED
Status: DISCONTINUED | OUTPATIENT
Start: 2021-11-08 | End: 2021-11-08 | Stop reason: SURG

## 2021-11-08 RX ORDER — METOCLOPRAMIDE HYDROCHLORIDE 5 MG/ML
10 INJECTION INTRAMUSCULAR; INTRAVENOUS AS NEEDED
Status: DISCONTINUED | OUTPATIENT
Start: 2021-11-08 | End: 2021-11-08

## 2021-11-08 RX ORDER — MIDAZOLAM HYDROCHLORIDE 1 MG/ML
INJECTION INTRAMUSCULAR; INTRAVENOUS AS NEEDED
Status: DISCONTINUED | OUTPATIENT
Start: 2021-11-08 | End: 2021-11-08 | Stop reason: SURG

## 2021-11-08 RX ORDER — SODIUM CHLORIDE, SODIUM LACTATE, POTASSIUM CHLORIDE, CALCIUM CHLORIDE 600; 310; 30; 20 MG/100ML; MG/100ML; MG/100ML; MG/100ML
INJECTION, SOLUTION INTRAVENOUS CONTINUOUS
Status: DISCONTINUED | OUTPATIENT
Start: 2021-11-08 | End: 2021-11-08

## 2021-11-08 RX ORDER — MIDAZOLAM HYDROCHLORIDE 1 MG/ML
1 INJECTION INTRAMUSCULAR; INTRAVENOUS EVERY 5 MIN PRN
Status: DISCONTINUED | OUTPATIENT
Start: 2021-11-08 | End: 2021-11-08

## 2021-11-08 RX ORDER — ONDANSETRON 4 MG/1
4 TABLET, FILM COATED ORAL EVERY 8 HOURS PRN
Qty: 10 TABLET | Refills: 0 | Status: SHIPPED | OUTPATIENT
Start: 2021-11-08 | End: 2021-12-17 | Stop reason: ALTCHOICE

## 2021-11-08 RX ORDER — ONDANSETRON 2 MG/ML
4 INJECTION INTRAMUSCULAR; INTRAVENOUS AS NEEDED
Status: DISCONTINUED | OUTPATIENT
Start: 2021-11-08 | End: 2021-11-08

## 2021-11-08 RX ORDER — NALOXONE HYDROCHLORIDE 0.4 MG/ML
80 INJECTION, SOLUTION INTRAMUSCULAR; INTRAVENOUS; SUBCUTANEOUS AS NEEDED
Status: DISCONTINUED | OUTPATIENT
Start: 2021-11-08 | End: 2021-11-08

## 2021-11-08 RX ORDER — MEPERIDINE HYDROCHLORIDE 25 MG/ML
12.5 INJECTION INTRAMUSCULAR; INTRAVENOUS; SUBCUTANEOUS AS NEEDED
Status: DISCONTINUED | OUTPATIENT
Start: 2021-11-08 | End: 2021-11-08

## 2021-11-08 RX ORDER — KETOROLAC TROMETHAMINE 30 MG/ML
INJECTION, SOLUTION INTRAMUSCULAR; INTRAVENOUS AS NEEDED
Status: DISCONTINUED | OUTPATIENT
Start: 2021-11-08 | End: 2021-11-08 | Stop reason: SURG

## 2021-11-08 RX ORDER — VANCOMYCIN HYDROCHLORIDE 1 G/20ML
INJECTION, POWDER, LYOPHILIZED, FOR SOLUTION INTRAVENOUS AS NEEDED
Status: DISCONTINUED | OUTPATIENT
Start: 2021-11-08 | End: 2021-11-08 | Stop reason: HOSPADM

## 2021-11-08 RX ORDER — ACETAMINOPHEN 500 MG
1000 TABLET ORAL ONCE
Status: DISCONTINUED | OUTPATIENT
Start: 2021-11-08 | End: 2021-11-08 | Stop reason: HOSPADM

## 2021-11-08 RX ORDER — LABETALOL HYDROCHLORIDE 5 MG/ML
5 INJECTION, SOLUTION INTRAVENOUS EVERY 5 MIN PRN
Status: DISCONTINUED | OUTPATIENT
Start: 2021-11-08 | End: 2021-11-08

## 2021-11-08 RX ORDER — HYDROCODONE BITARTRATE AND ACETAMINOPHEN 5; 325 MG/1; MG/1
1-2 TABLET ORAL EVERY 4 HOURS PRN
Qty: 20 TABLET | Refills: 0 | Status: SHIPPED | OUTPATIENT
Start: 2021-11-08 | End: 2021-12-13

## 2021-11-08 RX ORDER — NEOSTIGMINE METHYLSULFATE 1 MG/ML
INJECTION INTRAVENOUS AS NEEDED
Status: DISCONTINUED | OUTPATIENT
Start: 2021-11-08 | End: 2021-11-08 | Stop reason: SURG

## 2021-11-08 RX ORDER — ALPRAZOLAM 0.5 MG/1
TABLET ORAL NIGHTLY PRN
COMMUNITY
End: 2021-12-17

## 2021-11-08 RX ORDER — ROCURONIUM BROMIDE 10 MG/ML
INJECTION, SOLUTION INTRAVENOUS AS NEEDED
Status: DISCONTINUED | OUTPATIENT
Start: 2021-11-08 | End: 2021-11-08 | Stop reason: SURG

## 2021-11-08 RX ORDER — HYDROMORPHONE HYDROCHLORIDE 1 MG/ML
0.4 INJECTION, SOLUTION INTRAMUSCULAR; INTRAVENOUS; SUBCUTANEOUS EVERY 5 MIN PRN
Status: DISCONTINUED | OUTPATIENT
Start: 2021-11-08 | End: 2021-11-08

## 2021-11-08 RX ORDER — ACETAMINOPHEN 500 MG
1000 TABLET ORAL ONCE AS NEEDED
Status: DISCONTINUED | OUTPATIENT
Start: 2021-11-08 | End: 2021-11-08

## 2021-11-08 RX ORDER — GLYCOPYRROLATE 0.2 MG/ML
INJECTION, SOLUTION INTRAMUSCULAR; INTRAVENOUS AS NEEDED
Status: DISCONTINUED | OUTPATIENT
Start: 2021-11-08 | End: 2021-11-08 | Stop reason: SURG

## 2021-11-08 RX ORDER — HYDROCODONE BITARTRATE AND ACETAMINOPHEN 5; 325 MG/1; MG/1
1 TABLET ORAL AS NEEDED
Status: COMPLETED | OUTPATIENT
Start: 2021-11-08 | End: 2021-11-08

## 2021-11-08 RX ORDER — DOCUSATE SODIUM 100 MG/1
100 CAPSULE, LIQUID FILLED ORAL 2 TIMES DAILY
Qty: 30 CAPSULE | Refills: 0 | Status: SHIPPED | OUTPATIENT
Start: 2021-11-08 | End: 2021-12-17 | Stop reason: ALTCHOICE

## 2021-11-08 RX ORDER — HYDROCODONE BITARTRATE AND ACETAMINOPHEN 5; 325 MG/1; MG/1
2 TABLET ORAL AS NEEDED
Status: COMPLETED | OUTPATIENT
Start: 2021-11-08 | End: 2021-11-08

## 2021-11-08 RX ORDER — LIDOCAINE HYDROCHLORIDE 10 MG/ML
INJECTION, SOLUTION EPIDURAL; INFILTRATION; INTRACAUDAL; PERINEURAL AS NEEDED
Status: DISCONTINUED | OUTPATIENT
Start: 2021-11-08 | End: 2021-11-08 | Stop reason: SURG

## 2021-11-08 RX ORDER — LIDOCAINE HYDROCHLORIDE AND EPINEPHRINE 10; 10 MG/ML; UG/ML
INJECTION, SOLUTION INFILTRATION; PERINEURAL AS NEEDED
Status: DISCONTINUED | OUTPATIENT
Start: 2021-11-08 | End: 2021-11-08 | Stop reason: HOSPADM

## 2021-11-08 RX ADMIN — KETOROLAC TROMETHAMINE 30 MG: 30 INJECTION, SOLUTION INTRAMUSCULAR; INTRAVENOUS at 15:25:00

## 2021-11-08 RX ADMIN — SODIUM CHLORIDE, SODIUM LACTATE, POTASSIUM CHLORIDE, CALCIUM CHLORIDE: 600; 310; 30; 20 INJECTION, SOLUTION INTRAVENOUS at 15:48:00

## 2021-11-08 RX ADMIN — GLYCOPYRROLATE 0.4 MG: 0.2 INJECTION, SOLUTION INTRAMUSCULAR; INTRAVENOUS at 15:37:00

## 2021-11-08 RX ADMIN — DEXAMETHASONE SODIUM PHOSPHATE 8 MG: 4 MG/ML VIAL (ML) INJECTION at 13:33:00

## 2021-11-08 RX ADMIN — ROCURONIUM BROMIDE 50 MG: 10 INJECTION, SOLUTION INTRAVENOUS at 13:33:00

## 2021-11-08 RX ADMIN — MIDAZOLAM HYDROCHLORIDE 2 MG: 1 INJECTION INTRAMUSCULAR; INTRAVENOUS at 13:31:00

## 2021-11-08 RX ADMIN — LIDOCAINE HYDROCHLORIDE 50 MG: 10 INJECTION, SOLUTION EPIDURAL; INFILTRATION; INTRACAUDAL; PERINEURAL at 13:33:00

## 2021-11-08 RX ADMIN — NEOSTIGMINE METHYLSULFATE 4 MG: 1 INJECTION INTRAVENOUS at 15:37:00

## 2021-11-08 RX ADMIN — ONDANSETRON 4 MG: 2 INJECTION INTRAMUSCULAR; INTRAVENOUS at 15:25:00

## 2021-11-08 NOTE — ANESTHESIA PROCEDURE NOTES
Airway  Date/Time: 11/8/2021 1:35 PM  Urgency: elective      General Information and Staff    Patient location during procedure: OR  Anesthesiologist: Doyle Shankar MD  Performed: anesthesiologist     Indications and Patient Condition  Indications for

## 2021-11-08 NOTE — BRIEF OP NOTE
Pre-Operative Diagnosis: S/P  shunt [Z98.2]  Shunt malfunction, initial encounter [T89.122O]  Skin lesion of neck [L98.9]     Post-Operative Diagnosis: S/P  shunt [M78. 2]Shunt malfunction, initial encounter [T85.208A]Skin lesion of neck [L98.9]      Pr

## 2021-11-08 NOTE — H&P
History & Physical Examination    Patient Name: Alexus Busch  MRN: [id number]  CSN: [id number]  YOB: 1980    Diagnosis: Right neck granuloma. Discontinuous  shunt which may be nonfunctioning.       Present Illness: Admitted for explor Paternal Grandfather    • Stroke Paternal Grandfather      Social History    Tobacco Use      Smoking status: Never Smoker      Smokeless tobacco: Never Used    Alcohol use: Yes      Comment: 3-4 nights/week      SYSTEM Check if Review is Normal Check if P

## 2021-11-08 NOTE — ANESTHESIA PREPROCEDURE EVALUATION
PRE-OP EVALUATION    Patient Name: Zeyad Spence    Admit Diagnosis: S/P  shunt [Z98.2]  Shunt malfunction, initial encounter [S27.400G]  Skin lesion of neck [L98.9]    Pre-op Diagnosis: S/P  shunt [Z98.2]  Shunt malfunction, initial encounter [T nights/week      Drug use: No     Available pre-op labs reviewed.   Lab Results   Component Value Date    WBC 5.3 10/15/2021    RBC 4.89 10/15/2021    HGB 15.2 10/15/2021    HCT 44.7 10/15/2021    MCV 91.4 10/15/2021    MCH 31.1 10/15/2021    MCHC 34.0 10/1

## 2021-11-08 NOTE — ANESTHESIA POSTPROCEDURE EVALUATION
[address] Patient Status:  Surgery Admit - Inpt   Age/Gender 46year old male MRN [id number]   Location [address] [facility] Attending Kole Dukes MD   Hosp Day # 0 PCP Harman Silva DO       Anesthesia Post-op Note Preop    Patient to be discharged from PACU when criteria met.

## 2021-11-08 NOTE — BRIEF OP NOTE
Pre-Operative Diagnosis: S/P  shunt [Z98.2]  Shunt malfunction, initial encounter [T88.598U]  Skin lesion of neck [L98.9]     Post-Operative Diagnosis: S/P  shunt [U53. 2]Shunt malfunction, initial encounter [T89.354B]Skin lesion of neck [L98.9]      Pr

## 2021-11-09 NOTE — OPERATIVE REPORT
[address]    PATIENT'S NAME: Vanessa Linares   ATTENDING PHYSICIAN: Jolaine Nyhan, M.D. OPERATING PHYSICIAN: Fanny Duenas MD   PATIENT ACCOUNT#:   [id number]    LOCATION:  [place] [address] [address]  MEDICAL RECORD #:   [id number]       D discussed. We also discussed the risk of recurrent malfunction or leakage of the  shunt and resulting recurrent wound and skin issues. Furthermore, need for additional surgery was discussed. Patient understands and wishes to proceed.   Questions were a of the granuloma down to the area of the broken  shunt tubing, then a marking stitch 4-0 Vicryl suture was placed, short stitch at the 12 o'clock superior aspect and a long stitch at the lateral 9 o'clock aspect.   Towards the lateral aspect, there was pi

## 2021-11-09 NOTE — OPERATIVE REPORT
[facility]    OPERATIVE REPORT    Patient:  Herrera Main;  YOB: 1980     CSN:  [id number]; Medical Record Number:  [id number]    Admission Date:  11/8/2021 Operation Date:  11/8/2021    . ..........................     Operating Jarred with a in situ graft cut from a length of ventricular catheter. Dr. Amelia Whiteside as dictated the cyst removal separately.     Procedure: Dr. Amelia Whiteside carefully ellipsed and removed the skin lesion and underlying attached soft tissue mass lesion all the way down to t and distal catheters. Dr. Darci Chairez stabilize the catheters while I tied them down. I then measured the distance between the straight connectors and cut a section of a antibiotic impregnated ventricular catheter to span the open gap.   I connected this and

## 2021-11-22 ENCOUNTER — OFFICE VISIT (OUTPATIENT)
Dept: SURGERY | Facility: CLINIC | Age: 41
End: 2021-11-22
Payer: COMMERCIAL

## 2021-11-22 VITALS
HEART RATE: 70 BPM | SYSTOLIC BLOOD PRESSURE: 110 MMHG | WEIGHT: 166 LBS | HEIGHT: 66.5 IN | DIASTOLIC BLOOD PRESSURE: 60 MMHG | BODY MASS INDEX: 26.36 KG/M2

## 2021-11-22 DIAGNOSIS — Z98.2 S/P VP SHUNT: ICD-10-CM

## 2021-11-22 DIAGNOSIS — G91.9 HYDROCEPHALUS, UNSPECIFIED TYPE (HCC): Primary | ICD-10-CM

## 2021-11-22 PROCEDURE — 3078F DIAST BP <80 MM HG: CPT | Performed by: PHYSICIAN ASSISTANT

## 2021-11-22 PROCEDURE — 3074F SYST BP LT 130 MM HG: CPT | Performed by: PHYSICIAN ASSISTANT

## 2021-11-22 PROCEDURE — 3008F BODY MASS INDEX DOCD: CPT | Performed by: PHYSICIAN ASSISTANT

## 2021-11-22 PROCEDURE — 99024 POSTOP FOLLOW-UP VISIT: CPT | Performed by: PHYSICIAN ASSISTANT

## 2021-11-22 NOTE — PATIENT INSTRUCTIONS
Refill policies:    • Allow 2-3 business days for refills; controlled substances may take longer.   • Contact your pharmacy at least 5 days prior to running out of medication and have them send an electronic request or submit request through the “request re Depending on your insurance carrier, approval may take 3-10 days. It is highly recommended patients contact their insurance carrier directly to determine coverage.   If test is done without insurance authorization, patient may be responsible for the entire next appointment to evaluate for any changes in his ventricle size  -Call with any changes

## 2021-11-22 NOTE — PROGRESS NOTES
Neurosurgery   Follow-up            HISTORY OF PRESENT ILLNESS: Debra Villanueva is a 46year old male returns in follow-up status post a right distal  shunt revision and right neck cyst evaluation by plastic surgery Dr. Sarai Giordano.     Overall he contacted our office with concern that the ulcerations may be in the area of the shunt.   We suggested he get a CT scan to determine if the shunt tubing was close to his potential operative site and after reviewing the scans I felt that it would be importan capsule (100 mg total) by mouth 2 (two) times daily. , Disp: 30 capsule, Rfl: 0  ondansetron (ZOFRAN) 4 mg tablet, Take 1 tablet (4 mg total) by mouth every 8 (eight) hours as needed for Nausea., Disp: 10 tablet, Rfl: 0  Multiple Vitamins-Minerals (MULTIVIT 2+        2+   Left      2+           2+       2+        2+       2+        2+       DIAGNOSTIC DATA:       IMAGING: The following imaging studies were reviewed to determine the anatomy of his intracranial and distal shunt catheters.   This required well ov 11/8/2021    PLAN:  -Follow-up with plastics as scheduled  -follow-up for reevaluation 12/30/2021  -CT of the head before his next appointment to evaluate for any changes in his ventricle size  -Call with any changes    Devorah Velazquez M.S., ES,

## 2021-11-22 NOTE — PROGRESS NOTES
Pt is here regarding: post op sx 11.08.21  VENTRICULOPERITONEAL SHUNT REVISION,  REVISION DISTAL CATHETER ONLY AT RIGHT ANTERIOR CERVICAL REGION (Jeanmarie Mcpherson), Excision of right neck cyst, scar  (Meka)          Patient states he is doing good.

## 2021-12-03 ENCOUNTER — HOSPITAL ENCOUNTER (OUTPATIENT)
Dept: CT IMAGING | Age: 41
Discharge: HOME | End: 2021-12-03
Attending: PHYSICIAN ASSISTANT
Payer: COMMERCIAL

## 2021-12-03 ENCOUNTER — TELEPHONE (OUTPATIENT)
Dept: SURGERY | Facility: CLINIC | Age: 41
End: 2021-12-03

## 2021-12-03 DIAGNOSIS — G91.9 HYDROCEPHALUS, UNSPECIFIED TYPE (HCC): ICD-10-CM

## 2021-12-03 DIAGNOSIS — Z98.2 S/P VP SHUNT: ICD-10-CM

## 2021-12-03 PROCEDURE — 70450 CT HEAD/BRAIN W/O DYE: CPT | Performed by: PHYSICIAN ASSISTANT

## 2021-12-08 ENCOUNTER — OFFICE VISIT (OUTPATIENT)
Dept: SURGERY | Facility: CLINIC | Age: 41
End: 2021-12-08
Payer: COMMERCIAL

## 2021-12-08 DIAGNOSIS — L90.5 SCAR OF NECK: Primary | ICD-10-CM

## 2021-12-08 PROCEDURE — 99024 POSTOP FOLLOW-UP VISIT: CPT | Performed by: SURGERY

## 2021-12-09 NOTE — PROGRESS NOTES
Alexus Busch is a 46year old male who presents today for a follow-up after revision of ventriculopperitoneal shunt (Dr. Tere Escalante) and excision of granuloma, right neck cyst and complex layered wound closure right neck on 11/8/2021.  He denies fever and

## 2021-12-13 ENCOUNTER — OFFICE VISIT (OUTPATIENT)
Dept: FAMILY MEDICINE CLINIC | Facility: CLINIC | Age: 41
End: 2021-12-13
Payer: COMMERCIAL

## 2021-12-13 ENCOUNTER — APPOINTMENT (OUTPATIENT)
Dept: CT IMAGING | Age: 41
End: 2021-12-13
Attending: EMERGENCY MEDICINE
Payer: COMMERCIAL

## 2021-12-13 ENCOUNTER — HOSPITAL ENCOUNTER (EMERGENCY)
Age: 41
Discharge: HOME | End: 2021-12-13
Attending: EMERGENCY MEDICINE
Payer: COMMERCIAL

## 2021-12-13 VITALS
HEIGHT: 66 IN | TEMPERATURE: 97 F | RESPIRATION RATE: 16 BRPM | BODY MASS INDEX: 26.68 KG/M2 | DIASTOLIC BLOOD PRESSURE: 94 MMHG | HEART RATE: 76 BPM | WEIGHT: 166 LBS | OXYGEN SATURATION: 100 % | SYSTOLIC BLOOD PRESSURE: 133 MMHG

## 2021-12-13 VITALS
DIASTOLIC BLOOD PRESSURE: 90 MMHG | HEART RATE: 81 BPM | TEMPERATURE: 98 F | SYSTOLIC BLOOD PRESSURE: 130 MMHG | WEIGHT: 165 LBS | OXYGEN SATURATION: 99 % | RESPIRATION RATE: 18 BRPM | HEIGHT: 66 IN | BODY MASS INDEX: 26.52 KG/M2

## 2021-12-13 DIAGNOSIS — R10.9 ABDOMINAL PAIN OF UNKNOWN ETIOLOGY: Primary | ICD-10-CM

## 2021-12-13 DIAGNOSIS — Z02.9 ADMINISTRATIVE ENCOUNTER: Primary | ICD-10-CM

## 2021-12-13 PROCEDURE — 74177 CT ABD & PELVIS W/CONTRAST: CPT | Performed by: EMERGENCY MEDICINE

## 2021-12-13 PROCEDURE — 3008F BODY MASS INDEX DOCD: CPT | Performed by: NURSE PRACTITIONER

## 2021-12-13 PROCEDURE — 3080F DIAST BP >= 90 MM HG: CPT | Performed by: NURSE PRACTITIONER

## 2021-12-13 PROCEDURE — 80053 COMPREHEN METABOLIC PANEL: CPT | Performed by: EMERGENCY MEDICINE

## 2021-12-13 PROCEDURE — 81003 URINALYSIS AUTO W/O SCOPE: CPT | Performed by: EMERGENCY MEDICINE

## 2021-12-13 PROCEDURE — 99284 EMERGENCY DEPT VISIT MOD MDM: CPT

## 2021-12-13 PROCEDURE — 36415 COLL VENOUS BLD VENIPUNCTURE: CPT

## 2021-12-13 PROCEDURE — 3075F SYST BP GE 130 - 139MM HG: CPT | Performed by: NURSE PRACTITIONER

## 2021-12-13 PROCEDURE — 81003 URINALYSIS AUTO W/O SCOPE: CPT

## 2021-12-13 PROCEDURE — 85025 COMPLETE CBC W/AUTO DIFF WBC: CPT | Performed by: EMERGENCY MEDICINE

## 2021-12-13 NOTE — PROGRESS NOTES
Pt presented with lower abd pain, more on right side X 4-5 days. Reports it feels a little better than initially, but still there. Reports back pain a few days ago, but that has lessened. Had surgery 1 mo ago for  shunt revision.   Denies fever, n/v/d.

## 2021-12-13 NOTE — ED PROVIDER NOTES
Patient Seen in: THE [facility] Emergency Department In [place]      History   Patient presents with:  Abdominal Pain    Stated Complaint: TL- abd pain X 4-5 days sent from [facility] clinic.     Subjective:   HPI    Patient is a 72-year-old male who presents for BMI 26.79 kg/m²         Physical Exam  Vitals and nursing note reviewed. Constitutional:       Appearance: He is well-developed. HENT:      Head: Normocephalic and atraumatic.    Eyes:      Conjunctiva/sclera: Conjunctivae normal.      Pupils: Pupils a information is transmitted to the [facility] ([facility]) NRDR ([address]) which includes the Dose Index Registry.   PATIENT STATED HISTORY: (As transcribed by Technologist)  Michel Carrasco has no complaints, imaging assessment f imaging was performed. Dose reduction techniques were used. Dose information is transmitted to the [facility]) NRDR ([address]) which includes the Dose Index Registry.   PATIENT STATED HISTORY:(As transcribed on 12/13/2021 at 5:07 PM              MDM      29-year-old male presenting for evaluation of abdominal pain, primarily right lower quadrant. Waxing and waning pain for 4 days, more in the low abdomen and typically worse in the right lower quadrant.   Mild

## 2021-12-17 ENCOUNTER — OFFICE VISIT (OUTPATIENT)
Dept: FAMILY MEDICINE CLINIC | Facility: CLINIC | Age: 41
End: 2021-12-17
Payer: COMMERCIAL

## 2021-12-17 VITALS
HEIGHT: 66 IN | RESPIRATION RATE: 16 BRPM | WEIGHT: 170 LBS | OXYGEN SATURATION: 99 % | HEART RATE: 82 BPM | TEMPERATURE: 98 F | DIASTOLIC BLOOD PRESSURE: 76 MMHG | BODY MASS INDEX: 27.32 KG/M2 | SYSTOLIC BLOOD PRESSURE: 118 MMHG

## 2021-12-17 DIAGNOSIS — R14.0 ABDOMINAL BLOATING: ICD-10-CM

## 2021-12-17 DIAGNOSIS — N40.0 ENLARGED PROSTATE: ICD-10-CM

## 2021-12-17 DIAGNOSIS — F41.8 ANXIETY ABOUT HEALTH: ICD-10-CM

## 2021-12-17 DIAGNOSIS — R91.1 NODULE OF RIGHT LUNG: Primary | ICD-10-CM

## 2021-12-17 DIAGNOSIS — R14.0 GASSINESS: ICD-10-CM

## 2021-12-17 PROCEDURE — 3074F SYST BP LT 130 MM HG: CPT | Performed by: FAMILY MEDICINE

## 2021-12-17 PROCEDURE — 81003 URINALYSIS AUTO W/O SCOPE: CPT | Performed by: FAMILY MEDICINE

## 2021-12-17 PROCEDURE — 99214 OFFICE O/P EST MOD 30 MIN: CPT | Performed by: FAMILY MEDICINE

## 2021-12-17 PROCEDURE — 3078F DIAST BP <80 MM HG: CPT | Performed by: FAMILY MEDICINE

## 2021-12-17 PROCEDURE — 87086 URINE CULTURE/COLONY COUNT: CPT | Performed by: FAMILY MEDICINE

## 2021-12-17 PROCEDURE — 3008F BODY MASS INDEX DOCD: CPT | Performed by: FAMILY MEDICINE

## 2021-12-17 RX ORDER — ALPRAZOLAM 0.5 MG/1
0.5 TABLET ORAL NIGHTLY PRN
Qty: 30 TABLET | Refills: 1 | Status: SHIPPED | OUTPATIENT
Start: 2021-12-17

## 2021-12-17 RX ORDER — SIMETHICONE 125 MG
1 CAPSULE ORAL
Qty: 60 CAPSULE | Refills: 0 | Status: SHIPPED | OUTPATIENT
Start: 2021-12-17 | End: 2022-01-16

## 2021-12-17 NOTE — PROGRESS NOTES
Subjective:   Khoa Zeng is a 46year old male who presents for ER F/U (12/13 for abdominal pain)     Patient presents with:  ER F/U: 12/13 for abdominal pain      History/Other:   Chief Complaint Reviewed and Verified  Nursing Notes Reviewed and 12/03/2021 at 9:10 AM       CT ABDOMEN+PELVIS(CONTRAST ONLY)(CPT=74177)    Addendum Date: 12/13/2021    ADDENDUM:  There is a 6 mm nodule in the right middle lobe.   The findings include a single, incidentally detected, solid pulmonary nodule, measuring les 10/06/2021 at 9:19 AM           Current Outpatient Medications   Medication Sig Dispense Refill   • ALPRAZolam 0.5 MG Oral Tab Take 1 tablet (0.5 mg total) by mouth nightly as needed.  30 tablet 1   • Multiple Vitamins-Minerals (MULTIVITAMIN ADULT EXTRA C O regular rhythm. Heart sounds: Normal heart sounds. No murmur heard. No friction rub. No gallop. Pulmonary:      Effort: Pulmonary effort is normal. No respiratory distress. Breath sounds: Normal breath sounds. No wheezing or rales.    Abdomina suspect gassiness. Advised to take Gas-x bid before meals. Notify if worsening. 5. Gassiness  As above.    counseled on diet     Jered Winkler DO, 12/17/2021, 9:56 AM     25 minutes were spent face to face with the patient in which time was spent

## 2021-12-20 ENCOUNTER — PATIENT MESSAGE (OUTPATIENT)
Dept: FAMILY MEDICINE CLINIC | Facility: CLINIC | Age: 41
End: 2021-12-20

## 2021-12-20 NOTE — TELEPHONE ENCOUNTER
From: Manjinder Brand  To: Ck Garvin DO  Sent: 12/20/2021 10:24 AM CST  Subject: Question regarding URINE CULTURE, ROUTINE    Thank you for the results here and ruling out a UTI.  I have already scheduled a consultation with the recommended urolo

## 2021-12-28 ENCOUNTER — OFFICE VISIT (OUTPATIENT)
Dept: SURGERY | Facility: CLINIC | Age: 41
End: 2021-12-28
Payer: COMMERCIAL

## 2021-12-28 VITALS
WEIGHT: 170 LBS | BODY MASS INDEX: 27.32 KG/M2 | DIASTOLIC BLOOD PRESSURE: 78 MMHG | HEIGHT: 66 IN | SYSTOLIC BLOOD PRESSURE: 122 MMHG

## 2021-12-28 DIAGNOSIS — Z12.5 SCREENING FOR PROSTATE CANCER: ICD-10-CM

## 2021-12-28 DIAGNOSIS — N40.1 BPH WITH OBSTRUCTION/LOWER URINARY TRACT SYMPTOMS: ICD-10-CM

## 2021-12-28 DIAGNOSIS — Z98.2 S/P VP SHUNT: ICD-10-CM

## 2021-12-28 DIAGNOSIS — N41.1 CHRONIC PROSTATITIS: Primary | ICD-10-CM

## 2021-12-28 DIAGNOSIS — N13.8 BPH WITH OBSTRUCTION/LOWER URINARY TRACT SYMPTOMS: ICD-10-CM

## 2021-12-28 DIAGNOSIS — G91.9 HYDROCEPHALUS, UNSPECIFIED TYPE (HCC): Primary | ICD-10-CM

## 2021-12-28 DIAGNOSIS — M62.89 PELVIC FLOOR TENSION: ICD-10-CM

## 2021-12-28 PROCEDURE — 51798 US URINE CAPACITY MEASURE: CPT | Performed by: PHYSICIAN ASSISTANT

## 2021-12-28 PROCEDURE — 81003 URINALYSIS AUTO W/O SCOPE: CPT | Performed by: PHYSICIAN ASSISTANT

## 2021-12-28 PROCEDURE — 3008F BODY MASS INDEX DOCD: CPT | Performed by: PHYSICIAN ASSISTANT

## 2021-12-28 PROCEDURE — 3078F DIAST BP <80 MM HG: CPT | Performed by: PHYSICIAN ASSISTANT

## 2021-12-28 PROCEDURE — 3074F SYST BP LT 130 MM HG: CPT | Performed by: PHYSICIAN ASSISTANT

## 2021-12-28 PROCEDURE — 99024 POSTOP FOLLOW-UP VISIT: CPT | Performed by: PHYSICIAN ASSISTANT

## 2021-12-28 PROCEDURE — 99244 OFF/OP CNSLTJ NEW/EST MOD 40: CPT | Performed by: PHYSICIAN ASSISTANT

## 2021-12-28 NOTE — PROGRESS NOTES
VENTRICULOPERITONEAL SHUNT REVISION,  REVISION DISTAL CATHETER ONLY AT RIGHT ANTERIOR CERVICAL REGION (Trisha Pedlar), Excision of right neck cyst, scar  (Meka)    Went to ER on 12/20  No issues with shunt  Mild umbilical hernia, inoperable, does have pain there

## 2021-12-28 NOTE — PATIENT INSTRUCTIONS
Prostatitis   WHAT YOU SHOULD KNOW:   The prostate gland is a male sex gland. Fluid made by the prostate mixes with sperm (from the testicles) to make semen. Prostatitis (dixz-dis-ZWCI-tis) is an infection or inflammation of the prostate gland.  Men of any yourself at home:  · Rest at home until the fever is gone and you are feeling better. · If your healthcare gives you an antibiotic, take it exactly as you are told. Take it until it is all gone.   · Avoid sitting at a 90 degree angle for long periods of ti eating natural laxatives such as prunes, fresh fruits, and whole-grain cereals. If needed, use a mild over-the-counter (OTC) laxative for constipation. An OTC stool softener such as colace may be used to keep the stools soft.   · If sex is uncomfortable or

## 2021-12-28 NOTE — PATIENT INSTRUCTIONS
Refill policies:    • Allow 2-3 business days for refills; controlled substances may take longer.   • Contact your pharmacy at least 5 days prior to running out of medication and have them send an electronic request or submit request through the “request re Depending on your insurance carrier, approval may take 3-10 days. It is highly recommended patients contact their insurance carrier directly to determine coverage.   If test is done without insurance authorization, patient may be responsible for the entire follow-up with us regarding this  -Follow-up with his PCP regarding his findings on the CT of the abdomen, or pulmonology or other specialist regarding his prostate  -Images reviewed his questions were answered  -Call with any changes

## 2021-12-28 NOTE — PROGRESS NOTES
[address], [address] [facility]    Urology Consult Note    History of Present Illness:   Patient is a 46year old male with anxiety, hydrocephaleus, who presents today for consultation for possible enlarged prostate.      Presented to the ED for tobacco: Never Used    Vaping Use      Vaping Use: Never used    Alcohol use: Yes      Comment: 3-4 nights/week    Drug use: No       Allergies    Penicillins             HIVES    Review of Systems:   A 10-point review of systems was completed and is negat Urine Culture Results (last three years):  Lab Results   Component Value Date    URINECUL No Growth at 18-24 hrs. 12/17/2021       Imaging  CT BRAIN OR HEAD (09687)    Result Date: 12/3/2021  PROCEDURE:  CT BRAIN OR HEAD (59903)  COMPARISON:  [facility] Carly Pascual MD on 12/03/2021 at 9:07 AM     Finalized by (CST): Carly Pascual MD on 12/03/2021 at 9:10 AM       CT ABDOMEN+PELVIS(CONTRAST ONLY)(CPT=74177)    Addendum Date: 12/13/2021    ADDENDUM:  There is a 6 mm nodule in the right middle lo collection, ductal dilatation, or atrophy. SPLEEN:  No enlargement or focal lesion. KIDNEYS:  No mass, obstruction, or calcification. ADRENALS:  No mass or enlargement. AORTA/VASCULAR:  No aneurysm or dissection.   RETROPERITONEUM:  No mass or adenopath baths and avoid activities which may exacerbate pain such as sitting for prolonged periods of time, riding a bike/lawnmower/tractor, heavy lifting. Given current abdominal pain would not recommend initiation of NSAIDS.  He has no significant voiding complai

## 2021-12-28 NOTE — PROGRESS NOTES
Neurosurgery   Follow-up            HISTORY OF PRESENT ILLNESS: Gideon De Luna is a 46year old male returns in follow-up status post a right distal  shunt revision and right neck cyst evaluation by plastic surgery Dr. Rosetta Cranker Dr. Cecil Koh on 11/8/2021. had steroid injections several times which he said would temporarily shrink the knot. Eventually with some minor ulceration over the area for which he had antibiotics earlier in 2021 which helped this lesion improve.  Without antibiotic or steroid the mo grandmother; Hypertension in his paternal grandfather; No Known Problems in his father and mother; Stroke in his paternal grandfather. SOCIAL HISTORY:   reports that he has never smoked.  He has never used smokeless tobacco. He reports current alcohol us 5        5         5          5 5 5 5 5 5     Lower extremity strength: Last exam    Iliopsoas  Hamstrings   Quads    D-flexion P-flexion Eversion   Right       5         5       5         5 5 5   Left       5         5       5         5 5 5     Refle     Cerebellar tonsillar ectopia is stable.       Mild to moderate mucoperiosteal thickening of the paranasal sinuses is stable. The following imaging studies were reviewed to determine the anatomy of his intracranial and distal shunt catheters.   Yee Block 11/8/2021    PLAN:  -Follow-up with neurosurgery as needed regarding his shunt  -If he has ongoing cervical pain he can follow-up with us regarding this  -Follow-up with his PCP regarding his findings on the CT of the abdomen, or pulmonology or other speci

## 2022-01-17 ENCOUNTER — LAB ENCOUNTER (OUTPATIENT)
Dept: LAB | Age: 42
End: 2022-01-17
Attending: PHYSICIAN ASSISTANT
Payer: COMMERCIAL

## 2022-01-17 ENCOUNTER — PATIENT MESSAGE (OUTPATIENT)
Dept: SURGERY | Facility: CLINIC | Age: 42
End: 2022-01-17

## 2022-01-17 LAB — PSA SERPL-MCNC: 1.3 NG/ML (ref ?–4)

## 2022-01-17 NOTE — TELEPHONE ENCOUNTER
From: Thuy Krause  To: Bennie Michelle PA-C  Sent: 1/17/2022 2:25 PM CST  Subject: Question regarding PSA TOTAL W REFLEX TO FREE    Hi Nichol,    Is this a good result ?  I went in for the PSA today , roughly 3 weeks after my exam.     Please a

## 2022-06-03 DIAGNOSIS — F41.8 ANXIETY ABOUT HEALTH: ICD-10-CM

## 2022-06-05 DIAGNOSIS — F41.8 ANXIETY ABOUT HEALTH: ICD-10-CM

## 2022-06-06 RX ORDER — ALPRAZOLAM 0.5 MG/1
0.5 TABLET ORAL NIGHTLY PRN
Qty: 30 TABLET | Refills: 1 | Status: SHIPPED | OUTPATIENT
Start: 2022-06-06

## 2022-06-06 NOTE — TELEPHONE ENCOUNTER
Requesting Alprazolam 0.5mg  LOV: 12/17/21  RTC: not noted  Last Relevant Labs: 10/13/2020  Filled: 12/17/21 #30 with 1 refills    Future Appointments   Date Time Provider Yeiym Danii   6/10/2022  8:00 AM Blaier Aguirre, LCSW [facility] [place]   6/23/2022  8:00 AM Blaire Aguirre, LCSW [facility] [place]   7/8/2022  8:00 AM Blaire Aguirre, LCSW [facility]   7/21/2022  8:00 AM Savita Leyva, LCSW [facility]     Rx pended and routed for approval/denial

## 2022-06-06 NOTE — TELEPHONE ENCOUNTER
Requesting Alprazolam 0.5mg  LOV: 12/17/21  RTC: note  Last Relevant Labs: 10/15/21  Filled: 12/17/21 #30 with 1 refills    Future Appointments   Date Time Provider Yeimy Foy   6/10/2022  8:00 AM Huber Marcum, SANJUW [facility] [place]   6/23/2022  8:00 AM Huber Marcum, LCSW [facility]   7/8/2022  8:00 AM SANJU BassW [facility]   7/21/2022  8:00 AM Savita Wallace, LCSW [facility]     Rx pended and routed for approval/denial

## 2022-06-07 RX ORDER — ALPRAZOLAM 0.5 MG/1
0.5 TABLET ORAL NIGHTLY PRN
Qty: 30 TABLET | Refills: 1 | Status: SHIPPED | OUTPATIENT
Start: 2022-06-07

## 2022-08-25 ENCOUNTER — TELEMEDICINE (OUTPATIENT)
Dept: FAMILY MEDICINE CLINIC | Facility: CLINIC | Age: 42
End: 2022-08-25

## 2022-08-25 DIAGNOSIS — F41.8 ANXIETY ABOUT HEALTH: ICD-10-CM

## 2022-08-25 DIAGNOSIS — H00.011 HORDEOLUM EXTERNUM OF RIGHT UPPER EYELID: Primary | ICD-10-CM

## 2022-08-25 PROCEDURE — 99213 OFFICE O/P EST LOW 20 MIN: CPT | Performed by: FAMILY MEDICINE

## 2022-08-25 RX ORDER — ALPRAZOLAM 0.5 MG/1
0.5 TABLET ORAL NIGHTLY PRN
Qty: 30 TABLET | Refills: 1 | Status: SHIPPED | OUTPATIENT
Start: 2022-08-25

## 2022-08-25 RX ORDER — CLINDAMYCIN HYDROCHLORIDE 300 MG/1
300 CAPSULE ORAL 3 TIMES DAILY
Qty: 21 CAPSULE | Refills: 0 | Status: SHIPPED | OUTPATIENT
Start: 2022-08-25 | End: 2022-09-01

## 2022-08-29 ENCOUNTER — PATIENT MESSAGE (OUTPATIENT)
Dept: FAMILY MEDICINE CLINIC | Facility: CLINIC | Age: 42
End: 2022-08-29

## 2022-08-29 DIAGNOSIS — H00.011 HORDEOLUM EXTERNUM OF RIGHT UPPER EYELID: Primary | ICD-10-CM

## 2022-08-30 NOTE — TELEPHONE ENCOUNTER
See MyChart message below:    VV 8/25/21  1. Hordeolum externum of right upper eyelid  patient advised on hand hygiene, risks/s/s of orbital cellulitis, warm compress to the eye TID, clean towels, no contact lens use. Avoid touching the eye or rubbing the eye. Medication as per below. F/u 3 days or if resolved do not need to f/u. If worsening notify or go to ED  Started on clindamycin TID x 7 days.

## 2022-10-07 ENCOUNTER — TELEPHONE (OUTPATIENT)
Dept: ADMINISTRATIVE | Age: 42
End: 2022-10-07

## 2022-10-07 DIAGNOSIS — M79.671 FOOT PAIN, BILATERAL: Primary | ICD-10-CM

## 2022-10-07 DIAGNOSIS — M79.672 FOOT PAIN, BILATERAL: Primary | ICD-10-CM

## 2022-10-07 DIAGNOSIS — M20.10 HALLUX VALGUS WITH BUNIONS, UNSPECIFIED LATERALITY: ICD-10-CM

## 2022-10-07 DIAGNOSIS — M21.619 HALLUX VALGUS WITH BUNIONS, UNSPECIFIED LATERALITY: ICD-10-CM

## 2022-10-07 NOTE — TELEPHONE ENCOUNTER
Patient requested a new referral to see Yunior Nieto review and sign plan and care Thank you. Aria VILLAR      [facility].

## 2022-10-20 ENCOUNTER — OFFICE VISIT (OUTPATIENT)
Dept: FAMILY MEDICINE CLINIC | Facility: CLINIC | Age: 42
End: 2022-10-20
Payer: COMMERCIAL

## 2022-10-20 ENCOUNTER — LAB ENCOUNTER (OUTPATIENT)
Dept: LAB | Age: 42
End: 2022-10-20
Attending: FAMILY MEDICINE
Payer: COMMERCIAL

## 2022-10-20 VITALS
HEIGHT: 66 IN | SYSTOLIC BLOOD PRESSURE: 120 MMHG | TEMPERATURE: 98 F | RESPIRATION RATE: 16 BRPM | HEART RATE: 70 BPM | WEIGHT: 163 LBS | BODY MASS INDEX: 26.2 KG/M2 | OXYGEN SATURATION: 99 % | DIASTOLIC BLOOD PRESSURE: 72 MMHG

## 2022-10-20 DIAGNOSIS — Z00.00 LABORATORY EXAM ORDERED AS PART OF ROUTINE GENERAL MEDICAL EXAMINATION: ICD-10-CM

## 2022-10-20 DIAGNOSIS — F41.8 ANXIETY ABOUT HEALTH: ICD-10-CM

## 2022-10-20 DIAGNOSIS — Z23 NEED FOR HPV VACCINATION: ICD-10-CM

## 2022-10-20 DIAGNOSIS — Z86.69 HX OF HYDROCEPHALUS: ICD-10-CM

## 2022-10-20 DIAGNOSIS — Z00.00 WELL ADULT EXAM: Primary | ICD-10-CM

## 2022-10-20 DIAGNOSIS — J40 BRONCHITIS: ICD-10-CM

## 2022-10-20 DIAGNOSIS — Z00.00 WELL ADULT EXAM: ICD-10-CM

## 2022-10-20 DIAGNOSIS — Z23 NEED FOR TDAP VACCINATION: ICD-10-CM

## 2022-10-20 LAB
ALBUMIN SERPL-MCNC: 3.6 G/DL (ref 3.4–5)
ALBUMIN/GLOB SERPL: 1 {RATIO} (ref 1–2)
ALP LIVER SERPL-CCNC: 50 U/L
ALT SERPL-CCNC: 21 U/L
ANION GAP SERPL CALC-SCNC: 3 MMOL/L (ref 0–18)
AST SERPL-CCNC: 18 U/L (ref 15–37)
BASOPHILS # BLD AUTO: 0.07 X10(3) UL (ref 0–0.2)
BASOPHILS NFR BLD AUTO: 1.2 %
BILIRUB SERPL-MCNC: 0.7 MG/DL (ref 0.1–2)
BUN BLD-MCNC: 13 MG/DL (ref 7–18)
CALCIUM BLD-MCNC: 8.9 MG/DL (ref 8.5–10.1)
CHLORIDE SERPL-SCNC: 107 MMOL/L (ref 98–112)
CHOLEST SERPL-MCNC: 137 MG/DL (ref ?–200)
CO2 SERPL-SCNC: 30 MMOL/L (ref 21–32)
CREAT BLD-MCNC: 0.98 MG/DL
EOSINOPHIL # BLD AUTO: 0.25 X10(3) UL (ref 0–0.7)
EOSINOPHIL NFR BLD AUTO: 4.2 %
ERYTHROCYTE [DISTWIDTH] IN BLOOD BY AUTOMATED COUNT: 12.2 %
FASTING PATIENT LIPID ANSWER: YES
FASTING STATUS PATIENT QL REPORTED: YES
GFR SERPLBLD BASED ON 1.73 SQ M-ARVRAT: 100 ML/MIN/1.73M2 (ref 60–?)
GLOBULIN PLAS-MCNC: 3.5 G/DL (ref 2.8–4.4)
GLUCOSE BLD-MCNC: 84 MG/DL (ref 70–99)
HCT VFR BLD AUTO: 46.1 %
HDLC SERPL-MCNC: 50 MG/DL (ref 40–59)
HGB BLD-MCNC: 15.1 G/DL
IMM GRANULOCYTES # BLD AUTO: 0.02 X10(3) UL (ref 0–1)
IMM GRANULOCYTES NFR BLD: 0.3 %
LDLC SERPL CALC-MCNC: 75 MG/DL (ref ?–100)
LYMPHOCYTES # BLD AUTO: 2.09 X10(3) UL (ref 1–4)
LYMPHOCYTES NFR BLD AUTO: 34.9 %
MCH RBC QN AUTO: 30.6 PG (ref 26–34)
MCHC RBC AUTO-ENTMCNC: 32.8 G/DL (ref 31–37)
MCV RBC AUTO: 93.3 FL
MONOCYTES # BLD AUTO: 0.64 X10(3) UL (ref 0.1–1)
MONOCYTES NFR BLD AUTO: 10.7 %
NEUTROPHILS # BLD AUTO: 2.91 X10 (3) UL (ref 1.5–7.7)
NEUTROPHILS # BLD AUTO: 2.91 X10(3) UL (ref 1.5–7.7)
NEUTROPHILS NFR BLD AUTO: 48.7 %
NONHDLC SERPL-MCNC: 87 MG/DL (ref ?–130)
OSMOLALITY SERPL CALC.SUM OF ELEC: 289 MOSM/KG (ref 275–295)
PLATELET # BLD AUTO: 212 10(3)UL (ref 150–450)
POTASSIUM SERPL-SCNC: 4.3 MMOL/L (ref 3.5–5.1)
PROT SERPL-MCNC: 7.1 G/DL (ref 6.4–8.2)
RBC # BLD AUTO: 4.94 X10(6)UL
SODIUM SERPL-SCNC: 140 MMOL/L (ref 136–145)
T4 FREE SERPL-MCNC: 1 NG/DL (ref 0.8–1.7)
TRIGL SERPL-MCNC: 55 MG/DL (ref 30–149)
TSI SER-ACNC: 1.31 MIU/ML (ref 0.36–3.74)
VLDLC SERPL CALC-MCNC: 8 MG/DL (ref 0–30)
WBC # BLD AUTO: 6 X10(3) UL (ref 4–11)

## 2022-10-20 PROCEDURE — 80061 LIPID PANEL: CPT | Performed by: FAMILY MEDICINE

## 2022-10-20 PROCEDURE — 90472 IMMUNIZATION ADMIN EACH ADD: CPT | Performed by: FAMILY MEDICINE

## 2022-10-20 PROCEDURE — 90715 TDAP VACCINE 7 YRS/> IM: CPT | Performed by: FAMILY MEDICINE

## 2022-10-20 PROCEDURE — 99213 OFFICE O/P EST LOW 20 MIN: CPT | Performed by: FAMILY MEDICINE

## 2022-10-20 PROCEDURE — 99396 PREV VISIT EST AGE 40-64: CPT | Performed by: FAMILY MEDICINE

## 2022-10-20 PROCEDURE — 80050 GENERAL HEALTH PANEL: CPT | Performed by: FAMILY MEDICINE

## 2022-10-20 PROCEDURE — 3008F BODY MASS INDEX DOCD: CPT | Performed by: FAMILY MEDICINE

## 2022-10-20 PROCEDURE — 90651 9VHPV VACCINE 2/3 DOSE IM: CPT | Performed by: FAMILY MEDICINE

## 2022-10-20 PROCEDURE — 3078F DIAST BP <80 MM HG: CPT | Performed by: FAMILY MEDICINE

## 2022-10-20 PROCEDURE — 84439 ASSAY OF FREE THYROXINE: CPT | Performed by: FAMILY MEDICINE

## 2022-10-20 PROCEDURE — 3074F SYST BP LT 130 MM HG: CPT | Performed by: FAMILY MEDICINE

## 2022-10-20 PROCEDURE — 90471 IMMUNIZATION ADMIN: CPT | Performed by: FAMILY MEDICINE

## 2022-10-20 RX ORDER — METHYLPREDNISOLONE 4 MG/1
TABLET ORAL
Qty: 21 EACH | Refills: 0 | Status: SHIPPED | OUTPATIENT
Start: 2022-10-20

## 2022-10-20 RX ORDER — ALPRAZOLAM 0.5 MG/1
0.5 TABLET ORAL NIGHTLY PRN
Qty: 30 TABLET | Refills: 1 | Status: SHIPPED | OUTPATIENT
Start: 2022-10-20

## 2022-10-21 ENCOUNTER — PATIENT MESSAGE (OUTPATIENT)
Dept: FAMILY MEDICINE CLINIC | Facility: CLINIC | Age: 42
End: 2022-10-21

## 2022-10-24 NOTE — TELEPHONE ENCOUNTER
From: Silvio Wong  To: Xavier Willett DO  Sent: 10/21/2022 11:02 AM CDT  Subject: Question regarding COMP METABOLIC PANEL (14)    Hi Dr Skye Doe,    Is the A/G ratio ok? It looks to be the only one near the edge.      Thanks,    Miranda

## 2022-11-03 ENCOUNTER — PATIENT MESSAGE (OUTPATIENT)
Dept: FAMILY MEDICINE CLINIC | Facility: CLINIC | Age: 42
End: 2022-11-03

## 2022-11-03 RX ORDER — ALBUTEROL SULFATE 90 UG/1
2 AEROSOL, METERED RESPIRATORY (INHALATION) EVERY 6 HOURS PRN
Qty: 8 G | Refills: 0 | Status: SHIPPED | OUTPATIENT
Start: 2022-11-03 | End: 2022-11-13

## 2022-11-03 NOTE — TELEPHONE ENCOUNTER
Last OV 10/20/22  4. Bronchitis  Recurrence of brochitis. Has appt with pulm regarding lung nodule and will be getting repeat CT scan. Advised to start steroid pack this weekend if cough does not resolve. - methylPREDNISolone (MEDROL) 4 MG Oral Tablet Therapy Pack; As directed. Dispense: 21 each; Refill: 0      Please advise- schedule video appt?

## 2022-11-04 ENCOUNTER — PATIENT MESSAGE (OUTPATIENT)
Dept: FAMILY MEDICINE CLINIC | Facility: CLINIC | Age: 42
End: 2022-11-04

## 2022-11-18 ENCOUNTER — PATIENT MESSAGE (OUTPATIENT)
Dept: FAMILY MEDICINE CLINIC | Facility: CLINIC | Age: 42
End: 2022-11-18

## 2022-11-28 ENCOUNTER — TELEPHONE (OUTPATIENT)
Facility: CLINIC | Age: 42
End: 2022-11-28

## 2022-11-28 NOTE — TELEPHONE ENCOUNTER
Pt has CT chest scheduled on 12-26-22 at [address] and needs PA. Reached out to [address] about PA and clinicals  Faxed as requested to [phone]. They will complete PA. Left message for pt with the above information.

## 2022-12-14 ENCOUNTER — OFFICE VISIT (OUTPATIENT)
Dept: SURGERY | Facility: CLINIC | Age: 42
End: 2022-12-14
Payer: COMMERCIAL

## 2022-12-14 DIAGNOSIS — R19.8 LOOSE STOOL IN NEWBORN: ICD-10-CM

## 2022-12-14 DIAGNOSIS — R19.5 LOOSE STOOLS: Primary | ICD-10-CM

## 2022-12-14 DIAGNOSIS — R82.90 URINE FINDING: ICD-10-CM

## 2022-12-14 LAB
APPEARANCE: CLEAR
BILIRUBIN: NEGATIVE
GLUCOSE (URINE DIPSTICK): NEGATIVE MG/DL
KETONES (URINE DIPSTICK): NEGATIVE MG/DL
LEUKOCYTES: NEGATIVE
MULTISTIX LOT#: NORMAL NUMERIC
NITRITE, URINE: NEGATIVE
OCCULT BLOOD: NEGATIVE
PH, URINE: 7 (ref 4.5–8)
PROTEIN (URINE DIPSTICK): NEGATIVE MG/DL
SPECIFIC GRAVITY: 1.01 (ref 1–1.03)
UROBILINOGEN,SEMI-QN: 0.2 MG/DL (ref 0–1.9)

## 2022-12-14 PROCEDURE — 81003 URINALYSIS AUTO W/O SCOPE: CPT | Performed by: PHYSICIAN ASSISTANT

## 2022-12-14 PROCEDURE — 99213 OFFICE O/P EST LOW 20 MIN: CPT | Performed by: PHYSICIAN ASSISTANT

## 2022-12-14 NOTE — PATIENT INSTRUCTIONS
[email or web address]    [facility]      Best of lucwalter with surgery.

## 2022-12-21 ENCOUNTER — PATIENT MESSAGE (OUTPATIENT)
Dept: FAMILY MEDICINE CLINIC | Facility: CLINIC | Age: 42
End: 2022-12-21

## 2022-12-22 ENCOUNTER — OFFICE VISIT (OUTPATIENT)
Dept: FAMILY MEDICINE CLINIC | Facility: CLINIC | Age: 42
End: 2022-12-22
Payer: COMMERCIAL

## 2022-12-22 VITALS
OXYGEN SATURATION: 98 % | SYSTOLIC BLOOD PRESSURE: 110 MMHG | BODY MASS INDEX: 26.52 KG/M2 | RESPIRATION RATE: 16 BRPM | HEIGHT: 66 IN | HEART RATE: 79 BPM | WEIGHT: 165 LBS | DIASTOLIC BLOOD PRESSURE: 70 MMHG | TEMPERATURE: 97 F

## 2022-12-22 DIAGNOSIS — Z23 NEED FOR VACCINATION: Primary | ICD-10-CM

## 2022-12-22 DIAGNOSIS — Z01.818 PREOP EXAMINATION: ICD-10-CM

## 2022-12-22 DIAGNOSIS — F41.8 ANXIETY ABOUT HEALTH: ICD-10-CM

## 2022-12-22 PROCEDURE — 99243 OFF/OP CNSLTJ NEW/EST LOW 30: CPT | Performed by: FAMILY MEDICINE

## 2022-12-22 PROCEDURE — 3008F BODY MASS INDEX DOCD: CPT | Performed by: FAMILY MEDICINE

## 2022-12-22 PROCEDURE — 3078F DIAST BP <80 MM HG: CPT | Performed by: FAMILY MEDICINE

## 2022-12-22 PROCEDURE — 90471 IMMUNIZATION ADMIN: CPT | Performed by: FAMILY MEDICINE

## 2022-12-22 PROCEDURE — 90651 9VHPV VACCINE 2/3 DOSE IM: CPT | Performed by: FAMILY MEDICINE

## 2022-12-22 PROCEDURE — 3074F SYST BP LT 130 MM HG: CPT | Performed by: FAMILY MEDICINE

## 2022-12-22 RX ORDER — ALPRAZOLAM 0.5 MG/1
0.5 TABLET ORAL NIGHTLY PRN
Qty: 30 TABLET | Refills: 1 | Status: SHIPPED | OUTPATIENT
Start: 2022-12-22

## 2022-12-27 ENCOUNTER — TELEPHONE (OUTPATIENT)
Dept: FAMILY MEDICINE CLINIC | Facility: CLINIC | Age: 42
End: 2022-12-27

## 2023-02-09 ENCOUNTER — OFFICE VISIT (OUTPATIENT)
Facility: CLINIC | Age: 43
End: 2023-02-09
Payer: COMMERCIAL

## 2023-02-09 VITALS
RESPIRATION RATE: 18 BRPM | WEIGHT: 168 LBS | SYSTOLIC BLOOD PRESSURE: 116 MMHG | OXYGEN SATURATION: 100 % | BODY MASS INDEX: 27 KG/M2 | DIASTOLIC BLOOD PRESSURE: 68 MMHG | HEIGHT: 66 IN

## 2023-02-09 DIAGNOSIS — R91.8 MULTIPLE PULMONARY NODULES: Primary | ICD-10-CM

## 2023-02-09 PROCEDURE — 3078F DIAST BP <80 MM HG: CPT | Performed by: INTERNAL MEDICINE

## 2023-02-09 PROCEDURE — 3074F SYST BP LT 130 MM HG: CPT | Performed by: INTERNAL MEDICINE

## 2023-02-09 PROCEDURE — 99214 OFFICE O/P EST MOD 30 MIN: CPT | Performed by: INTERNAL MEDICINE

## 2023-02-09 PROCEDURE — 3008F BODY MASS INDEX DOCD: CPT | Performed by: INTERNAL MEDICINE

## 2023-03-01 ENCOUNTER — PATIENT MESSAGE (OUTPATIENT)
Facility: CLINIC | Age: 43
End: 2023-03-01

## 2023-03-01 NOTE — TELEPHONE ENCOUNTER
From: Thuy Krause  To: Nitesh Barrera MD  Sent: 3/1/2023 1:34 PM CST  Subject: Question regarding CT    Hi, so I am good?

## 2023-03-24 DIAGNOSIS — F41.8 ANXIETY ABOUT HEALTH: ICD-10-CM

## 2023-03-27 NOTE — TELEPHONE ENCOUNTER
Requesting Alprazolam 0.5mg  LOV: 12/22/22  RTC: not noted  Last Relevant Labs: 10/20/22  Filled: 12/22/22 #30 with 1 refills    Future Appointments   Date Time Provider Yeimy Danii   4/10/2023  8:45 AM PF US [facility]3 PF US [place]   4/21/2023  8:00 AM Kit Pilar, LCSW [facility] [place]   4/21/2023  9:00 AM Glenda Woodard MD [facility]   5/5/2023  8:00 AM Kit Pilar, LCSW [facility] [place]   5/19/2023  8:00 AM Kit Pilar, LCSW [facility] [place]   2/9/2024  8:30 AM Curly Ang MD [facility]m [facility] [place]     Rx pended and routed for approval/denial

## 2023-03-28 RX ORDER — ALPRAZOLAM 0.5 MG/1
0.5 TABLET ORAL NIGHTLY PRN
Qty: 30 TABLET | Refills: 1 | Status: SHIPPED | OUTPATIENT
Start: 2023-03-28

## 2023-04-10 ENCOUNTER — HOSPITAL ENCOUNTER (OUTPATIENT)
Dept: ULTRASOUND IMAGING | Age: 43
Discharge: HOME | End: 2023-04-10
Attending: INTERNAL MEDICINE
Payer: COMMERCIAL

## 2023-04-10 DIAGNOSIS — K21.9 GASTROESOPHAGEAL REFLUX DISEASE, UNSPECIFIED WHETHER ESOPHAGITIS PRESENT: ICD-10-CM

## 2023-04-10 DIAGNOSIS — R10.10 PAIN OF UPPER ABDOMEN: ICD-10-CM

## 2023-04-10 DIAGNOSIS — R14.0 BLOATING: ICD-10-CM

## 2023-04-10 PROCEDURE — 76700 US EXAM ABDOM COMPLETE: CPT | Performed by: INTERNAL MEDICINE

## 2023-04-26 ENCOUNTER — OFFICE VISIT (OUTPATIENT)
Dept: FAMILY MEDICINE CLINIC | Facility: CLINIC | Age: 43
End: 2023-04-26
Payer: COMMERCIAL

## 2023-04-26 VITALS
HEIGHT: 66 IN | DIASTOLIC BLOOD PRESSURE: 62 MMHG | WEIGHT: 156 LBS | RESPIRATION RATE: 14 BRPM | HEART RATE: 83 BPM | BODY MASS INDEX: 25.07 KG/M2 | TEMPERATURE: 97 F | OXYGEN SATURATION: 98 % | SYSTOLIC BLOOD PRESSURE: 92 MMHG

## 2023-04-26 DIAGNOSIS — Z09 HOSPITAL DISCHARGE FOLLOW-UP: Primary | ICD-10-CM

## 2023-04-26 PROCEDURE — 3074F SYST BP LT 130 MM HG: CPT | Performed by: FAMILY MEDICINE

## 2023-04-26 PROCEDURE — 99214 OFFICE O/P EST MOD 30 MIN: CPT | Performed by: FAMILY MEDICINE

## 2023-04-26 PROCEDURE — 3078F DIAST BP <80 MM HG: CPT | Performed by: FAMILY MEDICINE

## 2023-04-26 PROCEDURE — 3008F BODY MASS INDEX DOCD: CPT | Performed by: FAMILY MEDICINE

## 2023-04-26 RX ORDER — LEVETIRACETAM 750 MG/1
1500 TABLET ORAL 2 TIMES DAILY
COMMUNITY
Start: 2023-04-24

## 2023-04-26 RX ORDER — FAMOTIDINE 20 MG/1
20 TABLET, FILM COATED ORAL
COMMUNITY
Start: 2023-04-24 | End: 2023-04-26

## 2023-04-27 ENCOUNTER — TELEPHONE (OUTPATIENT)
Dept: SURGERY | Facility: CLINIC | Age: 43
End: 2023-04-27

## 2023-04-27 ENCOUNTER — TELEPHONE (OUTPATIENT)
Dept: FAMILY MEDICINE CLINIC | Facility: CLINIC | Age: 43
End: 2023-04-27

## 2023-04-27 NOTE — TELEPHONE ENCOUNTER
PT's wife Miguel Segal brought in imaging disc; CT BRAIN WO CONTRAST, XR SHUNT  SERIES 6 VWS 4/22/2023, MRI BRAIN NEURO ONC, XR CHEST AP PORTABLE 4/23/23, Paperwork placed in RN bin for scanning.  Disc in drawer to be returned to pt next appt 5/1/2023

## 2023-04-27 NOTE — TELEPHONE ENCOUNTER
Per Antione, Esomeprazole medication has been approved from 4/27/23 through 4/26/24.     Approved  [id number]  Prior authorization approved Case ID: [id number]      Payer:  Antione JOSHUA Case: [id number], Status: Approved, Coverage Starts on: 4/27/2023 12:00:00 AM, Coverage Ends on: 4/26/2024 12:00:00 AM.   Approval Details    Authorization number: [id number]   Authorized from April 27, 2023 to April 26, 2024      Electronic appeal:  Not supported

## 2023-05-01 ENCOUNTER — OFFICE VISIT (OUTPATIENT)
Dept: SURGERY | Facility: CLINIC | Age: 43
End: 2023-05-01
Payer: COMMERCIAL

## 2023-05-01 ENCOUNTER — PATIENT MESSAGE (OUTPATIENT)
Dept: SURGERY | Facility: CLINIC | Age: 43
End: 2023-05-01

## 2023-05-01 VITALS
WEIGHT: 158 LBS | HEIGHT: 66.25 IN | SYSTOLIC BLOOD PRESSURE: 100 MMHG | HEART RATE: 76 BPM | DIASTOLIC BLOOD PRESSURE: 56 MMHG | BODY MASS INDEX: 25.39 KG/M2

## 2023-05-01 DIAGNOSIS — Z86.69 HX OF HYDROCEPHALUS: Primary | ICD-10-CM

## 2023-05-01 DIAGNOSIS — R56.9 SEIZURE (HCC): ICD-10-CM

## 2023-05-02 ENCOUNTER — TELEPHONE (OUTPATIENT)
Dept: NEUROLOGY | Facility: CLINIC | Age: 43
End: 2023-05-02

## 2023-05-10 ENCOUNTER — OFFICE VISIT (OUTPATIENT)
Dept: NEUROLOGY | Facility: CLINIC | Age: 43
End: 2023-05-10
Payer: COMMERCIAL

## 2023-05-10 VITALS
DIASTOLIC BLOOD PRESSURE: 58 MMHG | HEART RATE: 68 BPM | BODY MASS INDEX: 25 KG/M2 | SYSTOLIC BLOOD PRESSURE: 100 MMHG | RESPIRATION RATE: 16 BRPM | WEIGHT: 158 LBS

## 2023-05-10 DIAGNOSIS — R56.9 SEIZURES (HCC): Primary | ICD-10-CM

## 2023-05-10 PROCEDURE — 99205 OFFICE O/P NEW HI 60 MIN: CPT | Performed by: OTHER

## 2023-05-10 PROCEDURE — 3074F SYST BP LT 130 MM HG: CPT | Performed by: OTHER

## 2023-05-10 PROCEDURE — 3078F DIAST BP <80 MM HG: CPT | Performed by: OTHER

## 2023-05-10 RX ORDER — LEVETIRACETAM 750 MG/1
750 TABLET ORAL 2 TIMES DAILY
Qty: 60 TABLET | Refills: 1 | Status: SHIPPED | OUTPATIENT
Start: 2023-05-10

## 2023-05-10 RX ORDER — IBUPROFEN 200 MG
400 TABLET ORAL 2 TIMES DAILY
COMMUNITY

## 2023-05-10 NOTE — PROGRESS NOTES
Pt reports he had one seizure on 4/22/2023. Pt denies any seizures since that time. Pt reports he has a persisting headache that worsen throughout the day. Pt reports that Levetiracetam has worsened headaches. He also reports increased sensitivity to sound. Pt does reports he has constant \"ringing in his ears\" since seizure. He is unsure if this was present prior to starting medication, but reports he notices this daily now.     Pt and spouse report pt has been irritable on Levetiracetam.

## 2023-05-14 ENCOUNTER — PATIENT MESSAGE (OUTPATIENT)
Dept: NEUROLOGY | Facility: CLINIC | Age: 43
End: 2023-05-14

## 2023-05-15 ENCOUNTER — HOSPITAL ENCOUNTER (OUTPATIENT)
Dept: CT IMAGING | Facility: HOSPITAL | Age: 43
Discharge: HOME | End: 2023-05-15
Attending: NEUROLOGICAL SURGERY
Payer: COMMERCIAL

## 2023-05-15 DIAGNOSIS — R22.1 NECK SWELLING: ICD-10-CM

## 2023-05-15 PROBLEM — R56.9 SEIZURES (HCC): Status: ACTIVE | Noted: 2023-05-15

## 2023-05-15 LAB
CREAT BLD-MCNC: 1 MG/DL
GFR SERPLBLD BASED ON 1.73 SQ M-ARVRAT: 98 ML/MIN/1.73M2 (ref 60–?)

## 2023-05-15 PROCEDURE — 70491 CT SOFT TISSUE NECK W/DYE: CPT | Performed by: NEUROLOGICAL SURGERY

## 2023-05-15 PROCEDURE — 82565 ASSAY OF CREATININE: CPT

## 2023-05-15 RX ORDER — DIVALPROEX SODIUM 500 MG/1
500 TABLET, DELAYED RELEASE ORAL 2 TIMES DAILY
Qty: 60 TABLET | Refills: 2 | Status: SHIPPED | OUTPATIENT
Start: 2023-05-15

## 2023-05-15 NOTE — TELEPHONE ENCOUNTER
From: Hannah Alcazar  To: Ifeoma Mireles DO  Sent: 5/14/2023 9:55 AM CDT  Subject: Michelle Juan,    It was nice to meet you this past week, and the news coming out of that was positive for me so thank you for that. While o definitely feel less side effects cutting the Keppra in half, I wanted to ask if it is too soon to move and/or start to cycle onto Depakot (I think that was it)? I know you sent in the Rx for record, but I was just curious. Thank you in advance.      Femi Arita

## 2023-05-16 ENCOUNTER — TELEPHONE (OUTPATIENT)
Dept: FAMILY MEDICINE CLINIC | Facility: CLINIC | Age: 43
End: 2023-05-16

## 2023-05-16 ENCOUNTER — TELEPHONE (OUTPATIENT)
Dept: SURGERY | Facility: CLINIC | Age: 43
End: 2023-05-16

## 2023-05-16 NOTE — TELEPHONE ENCOUNTER
Spoke with the patient about the imaging findings. Will discuss with Dr. Henrry Marie and discuss the plan with the patient thereafter.

## 2023-05-17 ENCOUNTER — PATIENT MESSAGE (OUTPATIENT)
Dept: SURGERY | Facility: CLINIC | Age: 43
End: 2023-05-17

## 2023-05-17 DIAGNOSIS — F41.8 ANXIETY ABOUT HEALTH: ICD-10-CM

## 2023-05-18 NOTE — TELEPHONE ENCOUNTER
Can we please call Dr. Stefanie Cowart office to try to move up appt? Dr. Christi Goncalves has already talked to her.

## 2023-05-19 RX ORDER — ALPRAZOLAM 0.5 MG/1
0.5 TABLET ORAL NIGHTLY PRN
Qty: 30 TABLET | Refills: 1 | OUTPATIENT
Start: 2023-05-19

## 2023-05-19 NOTE — TELEPHONE ENCOUNTER
Requesting Alprazolam 0.5mg  LOV: 4/26/23   RTC: not noted  Last Relevant Labs: 10/20/22  Filled: 3/28/23 #30 with 1 refills    Future Appointments   Date Time Provider Yeimy Foy   5/31/2023  3:15 PM Kiel PEREA MD EMGPLSRECNAP [facility] Surg[facility]Onc   6/9/2023  7:45 AM PF MRI RM1 (1.5T) PF MRI [place]   6/15/2023  8:00 AM Abdi Edge LCSW [facility] PLF [facility] [place]   6/15/2023  9:00 AM Zeny Zaman MD ENBEBAPER2 [facility] [place]   6/30/2023  8:00 AM Abdi Edge LCSW [facility] [place]   7/14/2023  8:00 AM Abdi Edge, LCSW [facility] [place]   7/28/2023  1:00 PM Abdi Edge LCSW [facility] [place]   8/11/2023  8:00 AM Abdi Edge, LCSW [facility] [place]   8/15/2023  8:20 AM Ada Givens, DO ENIWOODRIDGE [id number]   2/9/2024  8:30 AM Gayle Hugo MD E[facility] Pulm [place] [facility] [place]     Per [place] , last dispensed 3/28/23 #30    Last Rx sent with 1 refill - spoke with pharmacy, refill on file, they will process and let pt know when Rx is ready.

## 2023-05-22 NOTE — TELEPHONE ENCOUNTER
Called Dr. Saritha Fernandez' office and spoke with Chen Montalvo. Per Chen Montalvo, patient's appointment has been changed to 5/31/23 from 6/21/23. Routed to Dr. Olga Orantes and JOSIANE Pete for an update.

## 2023-05-24 ENCOUNTER — PATIENT MESSAGE (OUTPATIENT)
Dept: SURGERY | Facility: CLINIC | Age: 43
End: 2023-05-24

## 2023-05-24 NOTE — TELEPHONE ENCOUNTER
Patient has a brain MRI scheduled for 6-9-23 and is asking if it will show his neck lump. He is seeing Dr Nelson Ryan on 5-31-23. It appears in his chart that outside MRI results were scanned into the patient's chart but there is nothing in the media section.

## 2023-05-26 NOTE — TELEPHONE ENCOUNTER
Noted pt's message, [employer] message sent to pt. Pt reporting \"green puss\" coming from lump on neck, pt seeing Dr Pj Cuello on 5/31/23. Picture attached to [employer] message sent by pt. Routed update to provider.

## 2023-05-31 ENCOUNTER — OFFICE VISIT (OUTPATIENT)
Dept: SURGERY | Facility: CLINIC | Age: 43
End: 2023-05-31
Payer: COMMERCIAL

## 2023-05-31 DIAGNOSIS — L90.5 SCAR OF NECK: Primary | ICD-10-CM

## 2023-05-31 PROCEDURE — 99213 OFFICE O/P EST LOW 20 MIN: CPT | Performed by: SURGERY

## 2023-05-31 PROCEDURE — 87070 CULTURE OTHR SPECIMN AEROBIC: CPT | Performed by: SURGERY

## 2023-05-31 PROCEDURE — 87205 SMEAR GRAM STAIN: CPT | Performed by: SURGERY

## 2023-05-31 PROCEDURE — 87075 CULTR BACTERIA EXCEPT BLOOD: CPT | Performed by: SURGERY

## 2023-05-31 PROCEDURE — 87186 SC STD MICRODIL/AGAR DIL: CPT | Performed by: SURGERY

## 2023-05-31 PROCEDURE — 87077 CULTURE AEROBIC IDENTIFY: CPT | Performed by: SURGERY

## 2023-05-31 RX ORDER — DOXYCYCLINE HYCLATE 100 MG
100 TABLET ORAL 2 TIMES DAILY
Qty: 28 TABLET | Refills: 1 | Status: SHIPPED | OUTPATIENT
Start: 2023-05-31 | End: 2023-06-14

## 2023-06-01 ENCOUNTER — MED REC SCAN ONLY (OUTPATIENT)
Dept: FAMILY MEDICINE CLINIC | Facility: CLINIC | Age: 43
End: 2023-06-01

## 2023-06-01 NOTE — TELEPHONE ENCOUNTER
Requested Renewals     Name from pharmacy: ESOMEPRAZOLE MAGNESIUM 20MG DR CAPS         Will file in chart as: ESOMEPRAZOLE MAGNESIUM 20 MG Oral Capsule Delayed Release    Sig: TAKE 1 CAPSULE(20 MG) BY MOUTH EVERY MORNING BEFORE BREAKFAST    Disp: 30 capsule    Refills: 0 (Pharmacy requested: Not specified)    Start: 6/1/2023    Class: Normal    Non-formulary        Future Appointments   Date Time Provider Yeimy Danii   6/9/2023  7:45 AM PF MRI RM1 (1.5T) PF MRI [place]   6/15/2023  8:00 AM SANJU GloverW [facility] [place]   6/15/2023  9:00 AM Karin Walker MD ENINAPER2 [facility] [place]   6/30/2023  8:00 AM SANJU GloverW [facility] [place]   7/14/2023  8:00 AM Isaac Estrada LCSW [facility] [place]   7/28/2023  1:00 PM Isaac Estrada LCSW [facility] [place]   8/11/2023  8:00 AM Isaac Estrada LCSW [facility] [place]   8/15/2023  8:20 AM DO NOELLE Cortez [id number]   2/9/2024  8:30 AM Alice Galicia MD  E[facility] Pulm [facility] [place]     LOV: 4/26/23 for hosp f/up  Last physical done 10/20/22    -medication pended for review.

## 2023-06-02 ENCOUNTER — TELEPHONE (OUTPATIENT)
Dept: SURGERY | Facility: CLINIC | Age: 43
End: 2023-06-02

## 2023-06-02 DIAGNOSIS — L90.5 SCAR OF NECK: Primary | ICD-10-CM

## 2023-06-02 RX ORDER — CIPROFLOXACIN 500 MG/1
500 TABLET, FILM COATED ORAL 2 TIMES DAILY
Qty: 14 TABLET | Refills: 1 | Status: SHIPPED | OUTPATIENT
Start: 2023-06-02 | End: 2023-06-09

## 2023-06-02 NOTE — TELEPHONE ENCOUNTER
Pt called and said playing  phone tag, called Marietta Dagoberto and said Vicky Bryant tried calling, she will call him again.

## 2023-06-05 DIAGNOSIS — S11.90XD OPEN WOUND OF NECK, SUBSEQUENT ENCOUNTER: ICD-10-CM

## 2023-06-05 DIAGNOSIS — L90.5 SCAR OF NECK: Primary | ICD-10-CM

## 2023-06-06 ENCOUNTER — TELEPHONE (OUTPATIENT)
Dept: SURGERY | Facility: CLINIC | Age: 43
End: 2023-06-06

## 2023-06-09 ENCOUNTER — TELEPHONE (OUTPATIENT)
Dept: SURGERY | Facility: CLINIC | Age: 43
End: 2023-06-09

## 2023-06-09 ENCOUNTER — HOSPITAL ENCOUNTER (OUTPATIENT)
Dept: MRI IMAGING | Age: 43
Discharge: HOME | End: 2023-06-09
Attending: NEUROLOGICAL SURGERY
Payer: COMMERCIAL

## 2023-06-09 ENCOUNTER — PATIENT MESSAGE (OUTPATIENT)
Dept: SURGERY | Facility: CLINIC | Age: 43
End: 2023-06-09

## 2023-06-09 DIAGNOSIS — L90.5 SCAR OF NECK: ICD-10-CM

## 2023-06-09 DIAGNOSIS — R56.9 SEIZURE (HCC): ICD-10-CM

## 2023-06-09 DIAGNOSIS — Z86.69 HX OF HYDROCEPHALUS: ICD-10-CM

## 2023-06-09 DIAGNOSIS — L90.5 SCAR OF NECK: Primary | ICD-10-CM

## 2023-06-09 PROCEDURE — A9575 INJ GADOTERATE MEGLUMI 0.1ML: HCPCS | Performed by: NEUROLOGICAL SURGERY

## 2023-06-09 PROCEDURE — 70553 MRI BRAIN STEM W/O & W/DYE: CPT | Performed by: NEUROLOGICAL SURGERY

## 2023-06-09 RX ORDER — CIPROFLOXACIN 500 MG/1
500 TABLET, FILM COATED ORAL 2 TIMES DAILY
Qty: 24 TABLET | Refills: 1 | Status: ON HOLD | OUTPATIENT
Start: 2023-06-09

## 2023-06-09 RX ORDER — GADOTERATE MEGLUMINE 376.9 MG/ML
15 INJECTION INTRAVENOUS
Status: COMPLETED | OUTPATIENT
Start: 2023-06-09 | End: 2023-06-09

## 2023-06-09 RX ORDER — DIVALPROEX SODIUM 500 MG/1
500 TABLET, DELAYED RELEASE ORAL 2 TIMES DAILY
Qty: 60 TABLET | Refills: 2 | Status: CANCELLED | OUTPATIENT
Start: 2023-06-09

## 2023-06-09 RX ADMIN — GADOTERATE MEGLUMINE 15 ML: 376.9 INJECTION INTRAVENOUS at 08:39:00

## 2023-06-09 NOTE — TELEPHONE ENCOUNTER
I will route to Dr. Parisi Tenstrike to review if he'd like to call with results or discuss in office on 6/15.

## 2023-06-09 NOTE — TELEPHONE ENCOUNTER
Tata with radiology calling for Dr Neno Vega as radiologist would like to discuss pt's MRI results. Tried to transfer to [facility] [phone], no ans, transferred call to nursing.

## 2023-06-10 ENCOUNTER — HOSPITAL ENCOUNTER (INPATIENT)
Facility: HOSPITAL | Age: 43
LOS: 13 days | Discharge: HOME HEALTH | End: 2023-06-23
Attending: HOSPITALIST | Admitting: NEUROLOGICAL SURGERY
Payer: COMMERCIAL

## 2023-06-10 ENCOUNTER — HOSPITAL ENCOUNTER (INPATIENT)
Facility: HOSPITAL | Age: 43
LOS: 13 days | Discharge: HOME HEALTH | DRG: 032 | End: 2023-06-23
Attending: HOSPITALIST | Admitting: NEUROLOGICAL SURGERY
Payer: COMMERCIAL

## 2023-06-10 DIAGNOSIS — B99.9 INFECTION: ICD-10-CM

## 2023-06-10 PROBLEM — T85.730A INFECTION OF VP (VENTRICULOPERITONEAL) SHUNT (HCC): Status: ACTIVE | Noted: 2023-06-10

## 2023-06-10 PROBLEM — T85.730A INFECTION OF VP (VENTRICULOPERITONEAL) SHUNT: Status: ACTIVE | Noted: 2023-06-10

## 2023-06-10 PROBLEM — F41.9 ANXIETY: Status: ACTIVE | Noted: 2021-10-17

## 2023-06-10 PROBLEM — G40.909 SEIZURE DISORDER (HCC): Status: ACTIVE | Noted: 2023-05-15

## 2023-06-10 LAB
ALBUMIN SERPL-MCNC: 3.5 G/DL (ref 3.4–5)
ALBUMIN/GLOB SERPL: 1.1 {RATIO} (ref 1–2)
ALP LIVER SERPL-CCNC: 41 U/L
ALT SERPL-CCNC: 19 U/L
ANION GAP SERPL CALC-SCNC: 0 MMOL/L (ref 0–18)
APTT PPP: 27.3 SECONDS (ref 23.3–35.6)
AST SERPL-CCNC: 16 U/L (ref 15–37)
BASOPHILS # BLD AUTO: 0.05 X10(3) UL (ref 0–0.2)
BASOPHILS NFR BLD AUTO: 0.9 %
BILIRUB SERPL-MCNC: 0.4 MG/DL (ref 0.1–2)
BUN BLD-MCNC: 8 MG/DL (ref 7–18)
CALCIUM BLD-MCNC: 9 MG/DL (ref 8.5–10.1)
CHLORIDE SERPL-SCNC: 111 MMOL/L (ref 98–112)
CO2 SERPL-SCNC: 28 MMOL/L (ref 21–32)
CREAT BLD-MCNC: 0.88 MG/DL
CRP SERPL-MCNC: <0.29 MG/DL (ref ?–0.3)
EOSINOPHIL # BLD AUTO: 0.01 X10(3) UL (ref 0–0.7)
EOSINOPHIL NFR BLD AUTO: 0.2 %
ERYTHROCYTE [DISTWIDTH] IN BLOOD BY AUTOMATED COUNT: 11.7 %
ERYTHROCYTE [SEDIMENTATION RATE] IN BLOOD: 5 MM/HR
GFR SERPLBLD BASED ON 1.73 SQ M-ARVRAT: 111 ML/MIN/1.73M2 (ref 60–?)
GLOBULIN PLAS-MCNC: 3.3 G/DL (ref 2.8–4.4)
GLUCOSE BLD-MCNC: 100 MG/DL (ref 70–99)
HCT VFR BLD AUTO: 41.4 %
HGB BLD-MCNC: 14.1 G/DL
IMM GRANULOCYTES # BLD AUTO: 0.02 X10(3) UL (ref 0–1)
IMM GRANULOCYTES NFR BLD: 0.3 %
INR BLD: 1.13 (ref 0.85–1.16)
LYMPHOCYTES # BLD AUTO: 1.29 X10(3) UL (ref 1–4)
LYMPHOCYTES NFR BLD AUTO: 22.3 %
MCH RBC QN AUTO: 29.4 PG (ref 26–34)
MCHC RBC AUTO-ENTMCNC: 34.1 G/DL (ref 31–37)
MCV RBC AUTO: 86.4 FL
MONOCYTES # BLD AUTO: 0.48 X10(3) UL (ref 0.1–1)
MONOCYTES NFR BLD AUTO: 8.3 %
NEUTROPHILS # BLD AUTO: 3.93 X10 (3) UL (ref 1.5–7.7)
NEUTROPHILS # BLD AUTO: 3.93 X10(3) UL (ref 1.5–7.7)
NEUTROPHILS NFR BLD AUTO: 68 %
OSMOLALITY SERPL CALC.SUM OF ELEC: 286 MOSM/KG (ref 275–295)
PLATELET # BLD AUTO: 171 10(3)UL (ref 150–450)
POTASSIUM SERPL-SCNC: 4.2 MMOL/L (ref 3.5–5.1)
PROCALCITONIN SERPL-MCNC: <0.05 NG/ML (ref ?–0.16)
PROT SERPL-MCNC: 6.8 G/DL (ref 6.4–8.2)
PROTHROMBIN TIME: 14.5 SECONDS (ref 11.6–14.8)
RBC # BLD AUTO: 4.79 X10(6)UL
SODIUM SERPL-SCNC: 139 MMOL/L (ref 136–145)
WBC # BLD AUTO: 5.8 X10(3) UL (ref 4–11)

## 2023-06-10 PROCEDURE — 3078F DIAST BP <80 MM HG: CPT | Performed by: INTERNAL MEDICINE

## 2023-06-10 PROCEDURE — 99223 1ST HOSP IP/OBS HIGH 75: CPT | Performed by: INTERNAL MEDICINE

## 2023-06-10 PROCEDURE — 99223 1ST HOSP IP/OBS HIGH 75: CPT | Performed by: NEUROLOGICAL SURGERY

## 2023-06-10 PROCEDURE — 3074F SYST BP LT 130 MM HG: CPT | Performed by: INTERNAL MEDICINE

## 2023-06-10 PROCEDURE — 8C01X6J COLLECTION OF CEREBROSPINAL FLUID FROM INDWELLING DEVICE IN NERVOUS SYSTEM: ICD-10-PCS | Performed by: NEUROLOGICAL SURGERY

## 2023-06-10 RX ORDER — DIVALPROEX SODIUM 500 MG/1
500 TABLET, DELAYED RELEASE ORAL 2 TIMES DAILY
Status: DISCONTINUED | OUTPATIENT
Start: 2023-06-10 | End: 2023-06-23

## 2023-06-10 RX ORDER — ACETAMINOPHEN 500 MG
500 TABLET ORAL EVERY 4 HOURS PRN
Status: DISCONTINUED | OUTPATIENT
Start: 2023-06-10 | End: 2023-06-15

## 2023-06-10 RX ORDER — PANTOPRAZOLE SODIUM 20 MG/1
20 TABLET, DELAYED RELEASE ORAL
Status: DISCONTINUED | OUTPATIENT
Start: 2023-06-10 | End: 2023-06-13

## 2023-06-10 RX ORDER — ALPRAZOLAM 0.5 MG/1
0.5 TABLET ORAL NIGHTLY PRN
Status: DISCONTINUED | OUTPATIENT
Start: 2023-06-10 | End: 2023-06-23

## 2023-06-10 RX ORDER — PROCHLORPERAZINE EDISYLATE 5 MG/ML
5 INJECTION INTRAMUSCULAR; INTRAVENOUS EVERY 8 HOURS PRN
Status: DISCONTINUED | OUTPATIENT
Start: 2023-06-10 | End: 2023-06-15

## 2023-06-10 RX ORDER — ONDANSETRON 2 MG/ML
4 INJECTION INTRAMUSCULAR; INTRAVENOUS EVERY 6 HOURS PRN
Status: DISCONTINUED | OUTPATIENT
Start: 2023-06-10 | End: 2023-06-15

## 2023-06-10 RX ORDER — CHLORAL HYDRATE 500 MG
1000 CAPSULE ORAL DAILY
Status: DISCONTINUED | OUTPATIENT
Start: 2023-06-10 | End: 2023-06-10

## 2023-06-10 NOTE — PROGRESS NOTES
Pharmacy Note: Dietary Supplement Discontinuation Per Policy    Omega 3 fatty acids has been discontinued on Gokul Nata per policy. This supplement may be restarted upon discharge using the medication reconciliation process.     Thank you,   Zack Hamlin, PharmD  6/10/2023,  11:42 AM

## 2023-06-10 NOTE — TELEPHONE ENCOUNTER
Discussed imaging results with the patient. Given the concerning imaging results, I recommend direct hospital admission tomorrow for expedited workup and possible surgery. We spoke for over 30 minutes answering his questions. We will plan for a shunt tap of the right parietooccipital shunt valve with routine labs and culture, infectious lab workup including ESR, CRP, procal, CBC, and infectious disease consult. I spoke with the nursing supervisor and  to facilitate the admission.

## 2023-06-10 NOTE — OPERATIVE REPORT
Neurosurgery operative report        Preoperative diagnosis:   shunt with enhancement surrounding ventricular catheter, questionable  shunt infection      Postoperative diagnosis:  Same      Procedure performed:  1. Right parietal ventriculoperitoneal shunt tap        Surgeon: Stephen Howard MD        Assistant: None        Anesthesia: None        Estimated blood loss: None        Indications for procedure:    Please also see the relevant progress notes for further information. Briefly, this patient has undergone multiple shunt procedures. His most recent MRI demonstrated some changes, raising the question of shunt infection. Discussed the risk and benefits of  shunt tap with the patient and his wife. Following discussion of risks and benefits, he chose to proceed with this procedure on an inpatient basis. Procedure in detail:    The scalp was cleaned with Betadine in the right parietal region. Using sterile technique and a 25-gauge butterfly, the valve was palpated. This was tapped with a 25-gauge butterfly. A 10 cc syringe was used to aspirate approximately 1.5 cc of turbid, brownish appearing fluid. The fluid was placed in a sterile specimen cup. The needle was withdrawn. The procedure was well-tolerated. Specimen: CSF aspirate for cell count, protein, glucose, Gram stain, aerobic, anaerobic, fungal, and mycobacterial cultures. This report was dictated using voice recognition technology. Errors in syntax or recognition may occur, and should not be construed to change the meaning of a sentence.

## 2023-06-10 NOTE — CM/SW NOTE
Received a call from Dr. Mary Veloz requesting direct admit for this pt w/ dx: shunt infection. Dr. Damaris Colindres [facility] hospitalist will be the admitting md and Dr. Reji Ha for consult. @4804 EDCM called and spoke to the pt. Instructed to come to ER/Registration in the morning at 0800. Currently we dont have a room # yet for the pt but once he register they will let him know. Pt voiced understanding.

## 2023-06-10 NOTE — PLAN OF CARE
Pt AOx4  Pt denies pain  RA   NSR  IV antibiotics started. Neurosurgery consulted  Plastic surgery consulted  ID consulted  Walked pt to healing garden. Call light within reach. Bed in lowest position and bed alarm on.  PT's and family question answered.

## 2023-06-11 ENCOUNTER — APPOINTMENT (OUTPATIENT)
Dept: CT IMAGING | Facility: HOSPITAL | Age: 43
End: 2023-06-11
Attending: NEUROLOGICAL SURGERY
Payer: COMMERCIAL

## 2023-06-11 ENCOUNTER — APPOINTMENT (OUTPATIENT)
Dept: CT IMAGING | Facility: HOSPITAL | Age: 43
DRG: 032 | End: 2023-06-11
Attending: NEUROLOGICAL SURGERY
Payer: COMMERCIAL

## 2023-06-11 PROBLEM — R56.9 SEIZURE (HCC): Status: ACTIVE | Noted: 2023-05-15

## 2023-06-11 LAB
ANION GAP SERPL CALC-SCNC: 2 MMOL/L (ref 0–18)
BASOPHILS # BLD AUTO: 0.09 X10(3) UL (ref 0–0.2)
BASOPHILS NFR BLD AUTO: 1.4 %
BUN BLD-MCNC: 13 MG/DL (ref 7–18)
CALCIUM BLD-MCNC: 8.8 MG/DL (ref 8.5–10.1)
CHLORIDE SERPL-SCNC: 112 MMOL/L (ref 98–112)
CO2 SERPL-SCNC: 27 MMOL/L (ref 21–32)
CREAT BLD-MCNC: 0.85 MG/DL
EOSINOPHIL # BLD AUTO: 0.21 X10(3) UL (ref 0–0.7)
EOSINOPHIL NFR BLD AUTO: 3.2 %
ERYTHROCYTE [DISTWIDTH] IN BLOOD BY AUTOMATED COUNT: 12.3 %
GFR SERPLBLD BASED ON 1.73 SQ M-ARVRAT: 113 ML/MIN/1.73M2 (ref 60–?)
GLUCOSE BLD-MCNC: 89 MG/DL (ref 70–99)
HCT VFR BLD AUTO: 40.9 %
HGB BLD-MCNC: 13.6 G/DL
IMM GRANULOCYTES # BLD AUTO: 0.03 X10(3) UL (ref 0–1)
IMM GRANULOCYTES NFR BLD: 0.5 %
LYMPHOCYTES # BLD AUTO: 2.29 X10(3) UL (ref 1–4)
LYMPHOCYTES NFR BLD AUTO: 35.2 %
MCH RBC QN AUTO: 29.2 PG (ref 26–34)
MCHC RBC AUTO-ENTMCNC: 33.3 G/DL (ref 31–37)
MCV RBC AUTO: 88 FL
MONOCYTES # BLD AUTO: 0.85 X10(3) UL (ref 0.1–1)
MONOCYTES NFR BLD AUTO: 13.1 %
NEUTROPHILS # BLD AUTO: 3.04 X10 (3) UL (ref 1.5–7.7)
NEUTROPHILS # BLD AUTO: 3.04 X10(3) UL (ref 1.5–7.7)
NEUTROPHILS NFR BLD AUTO: 46.6 %
OSMOLALITY SERPL CALC.SUM OF ELEC: 292 MOSM/KG (ref 275–295)
PLATELET # BLD AUTO: 161 10(3)UL (ref 150–450)
POTASSIUM SERPL-SCNC: 4.3 MMOL/L (ref 3.5–5.1)
RBC # BLD AUTO: 4.65 X10(6)UL
SODIUM SERPL-SCNC: 141 MMOL/L (ref 136–145)
WBC # BLD AUTO: 6.5 X10(3) UL (ref 4–11)

## 2023-06-11 PROCEDURE — 99232 SBSQ HOSP IP/OBS MODERATE 35: CPT | Performed by: NEUROLOGICAL SURGERY

## 2023-06-11 PROCEDURE — 74177 CT ABD & PELVIS W/CONTRAST: CPT | Performed by: NEUROLOGICAL SURGERY

## 2023-06-11 PROCEDURE — 99232 SBSQ HOSP IP/OBS MODERATE 35: CPT | Performed by: INTERNAL MEDICINE

## 2023-06-11 NOTE — PLAN OF CARE
Assumed care at 0730  Alert and oriented x4  RA. NSR on tele. VSS  Denies pain  IV abx  Up adlib. Ambulated in halls all morning. CT of abd/pelvis showed possible bowel perforation from  shunt. Plan for surgery tomorrow afternoon. General surg discussed plan w/ pt. Neuro surg needs to see in AM to discuss their part. Clear liquids. Golytely tonight. NPO at midnight  Seizure prec in place. Wife at bedside. Pt/family updated on plan of care  Call light within reach. Safety precautions in place.

## 2023-06-11 NOTE — PROGRESS NOTES
The patient was seen and examined at bedside. Discussed CT findings with the patient. CT demonstrates  shunt possibly penetrating the bowel. The patient's labs are within normal limits. CSF cultures grew enterococcus faecalis. ID on consult. Receiving IV zosyn. Neurosurgery plans to reevaluate the patient tomorrow morning for possible stent removal.     Dr. Du Luke plans to operate in combination with neurosurgery. He is planning an diagnostic laparoscopy, possible colon resection, possible colon repair. The patient should be NPO at midnight. Full consult to follow.        Claudetta Puffer PA-C

## 2023-06-12 ENCOUNTER — APPOINTMENT (OUTPATIENT)
Dept: CT IMAGING | Facility: HOSPITAL | Age: 43
DRG: 032 | End: 2023-06-12
Attending: NEUROLOGICAL SURGERY
Payer: COMMERCIAL

## 2023-06-12 ENCOUNTER — ANESTHESIA (OUTPATIENT)
Dept: SURGERY | Facility: HOSPITAL | Age: 43
End: 2023-06-12
Payer: COMMERCIAL

## 2023-06-12 ENCOUNTER — ANESTHESIA EVENT (OUTPATIENT)
Dept: SURGERY | Facility: HOSPITAL | Age: 43
End: 2023-06-12
Payer: COMMERCIAL

## 2023-06-12 ENCOUNTER — APPOINTMENT (OUTPATIENT)
Dept: CT IMAGING | Facility: HOSPITAL | Age: 43
End: 2023-06-12
Attending: NEUROLOGICAL SURGERY
Payer: COMMERCIAL

## 2023-06-12 LAB
ANTIBODY SCREEN: NEGATIVE
CLARITY CSF: CLEAR
COLOR CSF: COLORLESS
GLUCOSE CSF-MCNC: 79 MG/DL (ref 40–70)
PROT PATTERN CSF ELPH-IMP: 15 MG/DL (ref 15–45)
RBC # CSF: 1 /MM3 (ref ?–1)
RH BLOOD TYPE: POSITIVE
TOTAL CELLS COUNTED CSF: 0 /MM3 (ref 0–5)
TOTAL VOLUME CSF: 5 ML

## 2023-06-12 PROCEDURE — 3074F SYST BP LT 130 MM HG: CPT | Performed by: INTERNAL MEDICINE

## 2023-06-12 PROCEDURE — 00160J6 BYPASS CEREBRAL VENTRICLE TO PERITONEAL CAVITY WITH SYNTHETIC SUBSTITUTE, OPEN APPROACH: ICD-10-PCS | Performed by: NEUROLOGICAL SURGERY

## 2023-06-12 PROCEDURE — 3078F DIAST BP <80 MM HG: CPT | Performed by: INTERNAL MEDICINE

## 2023-06-12 PROCEDURE — 00W6XJZ REVISION OF SYNTHETIC SUBSTITUTE IN CEREBRAL VENTRICLE, EXTERNAL APPROACH: ICD-10-PCS | Performed by: SURGERY

## 2023-06-12 PROCEDURE — 0DN80ZZ RELEASE SMALL INTESTINE, OPEN APPROACH: ICD-10-PCS | Performed by: SURGERY

## 2023-06-12 PROCEDURE — 0JQ40ZZ REPAIR RIGHT NECK SUBCUTANEOUS TISSUE AND FASCIA, OPEN APPROACH: ICD-10-PCS | Performed by: SURGERY

## 2023-06-12 PROCEDURE — 00P60JZ REMOVAL OF SYNTHETIC SUBSTITUTE FROM CEREBRAL VENTRICLE, OPEN APPROACH: ICD-10-PCS | Performed by: NEUROLOGICAL SURGERY

## 2023-06-12 PROCEDURE — 70450 CT HEAD/BRAIN W/O DYE: CPT | Performed by: NEUROLOGICAL SURGERY

## 2023-06-12 PROCEDURE — 49429 REMOVAL OF SHUNT: CPT | Performed by: STUDENT IN AN ORGANIZED HEALTH CARE EDUCATION/TRAINING PROGRAM

## 2023-06-12 PROCEDURE — 0DT80ZZ RESECTION OF SMALL INTESTINE, OPEN APPROACH: ICD-10-PCS | Performed by: SURGERY

## 2023-06-12 PROCEDURE — 44120 REMOVAL OF SMALL INTESTINE: CPT | Performed by: STUDENT IN AN ORGANIZED HEALTH CARE EDUCATION/TRAINING PROGRAM

## 2023-06-12 PROCEDURE — 99255 IP/OBS CONSLTJ NEW/EST HI 80: CPT | Performed by: STUDENT IN AN ORGANIZED HEALTH CARE EDUCATION/TRAINING PROGRAM

## 2023-06-12 PROCEDURE — 0DCL8ZZ EXTIRPATION OF MATTER FROM TRANSVERSE COLON, VIA NATURAL OR ARTIFICIAL OPENING ENDOSCOPIC: ICD-10-PCS | Performed by: STUDENT IN AN ORGANIZED HEALTH CARE EDUCATION/TRAINING PROGRAM

## 2023-06-12 PROCEDURE — 0WJG4ZZ INSPECTION OF PERITONEAL CAVITY, PERCUTANEOUS ENDOSCOPIC APPROACH: ICD-10-PCS | Performed by: SURGERY

## 2023-06-12 PROCEDURE — S0030 INJECTION, METRONIDAZOLE: HCPCS | Performed by: ANESTHESIOLOGY

## 2023-06-12 PROCEDURE — 99232 SBSQ HOSP IP/OBS MODERATE 35: CPT | Performed by: INTERNAL MEDICINE

## 2023-06-12 PROCEDURE — 0JB40ZZ EXCISION OF RIGHT NECK SUBCUTANEOUS TISSUE AND FASCIA, OPEN APPROACH: ICD-10-PCS | Performed by: SURGERY

## 2023-06-12 PROCEDURE — 0WPG0JZ REMOVAL OF SYNTHETIC SUBSTITUTE FROM PERITONEAL CAVITY, OPEN APPROACH: ICD-10-PCS | Performed by: SURGERY

## 2023-06-12 PROCEDURE — 8C01X6J COLLECTION OF CEREBROSPINAL FLUID FROM INDWELLING DEVICE IN NERVOUS SYSTEM: ICD-10-PCS | Performed by: NEUROLOGICAL SURGERY

## 2023-06-12 RX ORDER — ACETAMINOPHEN 10 MG/ML
INJECTION, SOLUTION INTRAVENOUS AS NEEDED
Status: DISCONTINUED | OUTPATIENT
Start: 2023-06-12 | End: 2023-06-12 | Stop reason: SURG

## 2023-06-12 RX ORDER — PROCHLORPERAZINE EDISYLATE 5 MG/ML
10 INJECTION INTRAMUSCULAR; INTRAVENOUS ONCE AS NEEDED
Status: DISCONTINUED | OUTPATIENT
Start: 2023-06-12 | End: 2023-06-13

## 2023-06-12 RX ORDER — DEXAMETHASONE SODIUM PHOSPHATE 4 MG/ML
VIAL (ML) INJECTION AS NEEDED
Status: DISCONTINUED | OUTPATIENT
Start: 2023-06-12 | End: 2023-06-12 | Stop reason: SURG

## 2023-06-12 RX ORDER — HYDROMORPHONE HYDROCHLORIDE 1 MG/ML
0.4 INJECTION, SOLUTION INTRAMUSCULAR; INTRAVENOUS; SUBCUTANEOUS EVERY 5 MIN PRN
Status: DISPENSED | OUTPATIENT
Start: 2023-06-12 | End: 2023-06-12

## 2023-06-12 RX ORDER — HYDROMORPHONE HYDROCHLORIDE 1 MG/ML
0.2 INJECTION, SOLUTION INTRAMUSCULAR; INTRAVENOUS; SUBCUTANEOUS EVERY 5 MIN PRN
Status: DISCONTINUED | OUTPATIENT
Start: 2023-06-12 | End: 2023-06-12

## 2023-06-12 RX ORDER — ROCURONIUM BROMIDE 10 MG/ML
INJECTION, SOLUTION INTRAVENOUS AS NEEDED
Status: DISCONTINUED | OUTPATIENT
Start: 2023-06-12 | End: 2023-06-12 | Stop reason: SURG

## 2023-06-12 RX ORDER — NALOXONE HYDROCHLORIDE 0.4 MG/ML
80 INJECTION, SOLUTION INTRAMUSCULAR; INTRAVENOUS; SUBCUTANEOUS AS NEEDED
Status: DISCONTINUED | OUTPATIENT
Start: 2023-06-12 | End: 2023-06-13

## 2023-06-12 RX ORDER — MEPERIDINE HYDROCHLORIDE 25 MG/ML
INJECTION INTRAMUSCULAR; INTRAVENOUS; SUBCUTANEOUS
Status: DISPENSED
Start: 2023-06-12 | End: 2023-06-13

## 2023-06-12 RX ORDER — PROCHLORPERAZINE EDISYLATE 5 MG/ML
5 INJECTION INTRAMUSCULAR; INTRAVENOUS EVERY 8 HOURS PRN
Status: DISCONTINUED | OUTPATIENT
Start: 2023-06-12 | End: 2023-06-12

## 2023-06-12 RX ORDER — SODIUM CHLORIDE, SODIUM LACTATE, POTASSIUM CHLORIDE, CALCIUM CHLORIDE 600; 310; 30; 20 MG/100ML; MG/100ML; MG/100ML; MG/100ML
INJECTION, SOLUTION INTRAVENOUS CONTINUOUS
Status: DISCONTINUED | OUTPATIENT
Start: 2023-06-12 | End: 2023-06-15

## 2023-06-12 RX ORDER — METRONIDAZOLE 500 MG/100ML
INJECTION, SOLUTION INTRAVENOUS AS NEEDED
Status: DISCONTINUED | OUTPATIENT
Start: 2023-06-12 | End: 2023-06-12 | Stop reason: SURG

## 2023-06-12 RX ORDER — MEPERIDINE HYDROCHLORIDE 25 MG/ML
12.5 INJECTION INTRAMUSCULAR; INTRAVENOUS; SUBCUTANEOUS AS NEEDED
Status: DISCONTINUED | OUTPATIENT
Start: 2023-06-12 | End: 2023-06-12

## 2023-06-12 RX ORDER — SODIUM CHLORIDE, SODIUM LACTATE, POTASSIUM CHLORIDE, CALCIUM CHLORIDE 600; 310; 30; 20 MG/100ML; MG/100ML; MG/100ML; MG/100ML
INJECTION, SOLUTION INTRAVENOUS CONTINUOUS
Status: DISCONTINUED | OUTPATIENT
Start: 2023-06-12 | End: 2023-06-12

## 2023-06-12 RX ORDER — ONDANSETRON 2 MG/ML
4 INJECTION INTRAMUSCULAR; INTRAVENOUS EVERY 6 HOURS PRN
Status: DISCONTINUED | OUTPATIENT
Start: 2023-06-12 | End: 2023-06-12

## 2023-06-12 RX ORDER — MIDAZOLAM HYDROCHLORIDE 1 MG/ML
1 INJECTION INTRAMUSCULAR; INTRAVENOUS EVERY 5 MIN PRN
Status: DISCONTINUED | OUTPATIENT
Start: 2023-06-12 | End: 2023-06-13

## 2023-06-12 RX ORDER — DEXAMETHASONE SODIUM PHOSPHATE 4 MG/ML
4 VIAL (ML) INJECTION ONCE AS NEEDED
Status: DISCONTINUED | OUTPATIENT
Start: 2023-06-12 | End: 2023-06-13

## 2023-06-12 RX ORDER — ONDANSETRON 2 MG/ML
4 INJECTION INTRAMUSCULAR; INTRAVENOUS ONCE AS NEEDED
Status: DISCONTINUED | OUTPATIENT
Start: 2023-06-12 | End: 2023-06-13

## 2023-06-12 RX ORDER — SODIUM CHLORIDE 9 MG/ML
INJECTION, SOLUTION INTRAVENOUS CONTINUOUS PRN
Status: DISCONTINUED | OUTPATIENT
Start: 2023-06-12 | End: 2023-06-12 | Stop reason: SURG

## 2023-06-12 RX ORDER — HYDROMORPHONE HYDROCHLORIDE 1 MG/ML
0.4 INJECTION, SOLUTION INTRAMUSCULAR; INTRAVENOUS; SUBCUTANEOUS EVERY 5 MIN PRN
Status: DISCONTINUED | OUTPATIENT
Start: 2023-06-12 | End: 2023-06-13

## 2023-06-12 RX ORDER — VANCOMYCIN HYDROCHLORIDE 1 G/20ML
INJECTION, POWDER, LYOPHILIZED, FOR SOLUTION INTRAVENOUS AS NEEDED
Status: DISCONTINUED | OUTPATIENT
Start: 2023-06-12 | End: 2023-06-12 | Stop reason: HOSPADM

## 2023-06-12 RX ORDER — LORAZEPAM 2 MG/ML
0.5 INJECTION INTRAMUSCULAR ONCE
Status: COMPLETED | OUTPATIENT
Start: 2023-06-12 | End: 2023-06-12

## 2023-06-12 RX ORDER — PROCHLORPERAZINE EDISYLATE 5 MG/ML
5 INJECTION INTRAMUSCULAR; INTRAVENOUS EVERY 8 HOURS PRN
Status: DISCONTINUED | OUTPATIENT
Start: 2023-06-12 | End: 2023-06-13

## 2023-06-12 RX ORDER — LABETALOL HYDROCHLORIDE 5 MG/ML
5 INJECTION, SOLUTION INTRAVENOUS EVERY 10 MIN PRN
Status: DISCONTINUED | OUTPATIENT
Start: 2023-06-12 | End: 2023-06-13

## 2023-06-12 RX ORDER — MIDAZOLAM HYDROCHLORIDE 1 MG/ML
1 INJECTION INTRAMUSCULAR; INTRAVENOUS EVERY 5 MIN PRN
Status: DISCONTINUED | OUTPATIENT
Start: 2023-06-12 | End: 2023-06-12

## 2023-06-12 RX ORDER — LIDOCAINE HYDROCHLORIDE 10 MG/ML
INJECTION, SOLUTION EPIDURAL; INFILTRATION; INTRACAUDAL; PERINEURAL AS NEEDED
Status: DISCONTINUED | OUTPATIENT
Start: 2023-06-12 | End: 2023-06-12 | Stop reason: SURG

## 2023-06-12 RX ORDER — HYDROMORPHONE HYDROCHLORIDE 1 MG/ML
0.6 INJECTION, SOLUTION INTRAMUSCULAR; INTRAVENOUS; SUBCUTANEOUS EVERY 5 MIN PRN
Status: DISCONTINUED | OUTPATIENT
Start: 2023-06-12 | End: 2023-06-12

## 2023-06-12 RX ORDER — CEFOXITIN 2 G/1
INJECTION, POWDER, FOR SOLUTION INTRAVENOUS AS NEEDED
Status: DISCONTINUED | OUTPATIENT
Start: 2023-06-12 | End: 2023-06-12 | Stop reason: SURG

## 2023-06-12 RX ORDER — HYDROMORPHONE HYDROCHLORIDE 1 MG/ML
0.6 INJECTION, SOLUTION INTRAMUSCULAR; INTRAVENOUS; SUBCUTANEOUS EVERY 5 MIN PRN
Status: DISCONTINUED | OUTPATIENT
Start: 2023-06-12 | End: 2023-06-13

## 2023-06-12 RX ORDER — HYDROMORPHONE HYDROCHLORIDE 1 MG/ML
0.2 INJECTION, SOLUTION INTRAMUSCULAR; INTRAVENOUS; SUBCUTANEOUS EVERY 5 MIN PRN
Status: DISCONTINUED | OUTPATIENT
Start: 2023-06-12 | End: 2023-06-13

## 2023-06-12 RX ORDER — MEPERIDINE HYDROCHLORIDE 25 MG/ML
12.5 INJECTION INTRAMUSCULAR; INTRAVENOUS; SUBCUTANEOUS AS NEEDED
Status: DISCONTINUED | OUTPATIENT
Start: 2023-06-12 | End: 2023-06-18

## 2023-06-12 RX ORDER — MIDAZOLAM HYDROCHLORIDE 1 MG/ML
INJECTION INTRAMUSCULAR; INTRAVENOUS AS NEEDED
Status: DISCONTINUED | OUTPATIENT
Start: 2023-06-12 | End: 2023-06-12 | Stop reason: SURG

## 2023-06-12 RX ORDER — NALOXONE HYDROCHLORIDE 0.4 MG/ML
80 INJECTION, SOLUTION INTRAMUSCULAR; INTRAVENOUS; SUBCUTANEOUS AS NEEDED
Status: DISCONTINUED | OUTPATIENT
Start: 2023-06-12 | End: 2023-06-12

## 2023-06-12 RX ORDER — HYDROMORPHONE HYDROCHLORIDE 1 MG/ML
0.4 INJECTION, SOLUTION INTRAMUSCULAR; INTRAVENOUS; SUBCUTANEOUS EVERY 5 MIN PRN
Status: DISCONTINUED | OUTPATIENT
Start: 2023-06-12 | End: 2023-06-12

## 2023-06-12 RX ORDER — ONDANSETRON 2 MG/ML
4 INJECTION INTRAMUSCULAR; INTRAVENOUS EVERY 6 HOURS PRN
Status: DISCONTINUED | OUTPATIENT
Start: 2023-06-12 | End: 2023-06-13

## 2023-06-12 RX ADMIN — LIDOCAINE HYDROCHLORIDE 50 MG: 10 INJECTION, SOLUTION EPIDURAL; INFILTRATION; INTRACAUDAL; PERINEURAL at 15:16:00

## 2023-06-12 RX ADMIN — ROCURONIUM BROMIDE 10 MG: 10 INJECTION, SOLUTION INTRAVENOUS at 18:46:00

## 2023-06-12 RX ADMIN — SODIUM CHLORIDE: 9 INJECTION, SOLUTION INTRAVENOUS at 15:41:00

## 2023-06-12 RX ADMIN — ONDANSETRON 4 MG: 2 INJECTION INTRAMUSCULAR; INTRAVENOUS at 19:46:00

## 2023-06-12 RX ADMIN — ROCURONIUM BROMIDE 60 MG: 10 INJECTION, SOLUTION INTRAVENOUS at 15:16:00

## 2023-06-12 RX ADMIN — ROCURONIUM BROMIDE 20 MG: 10 INJECTION, SOLUTION INTRAVENOUS at 17:15:00

## 2023-06-12 RX ADMIN — DEXAMETHASONE SODIUM PHOSPHATE 8 MG: 4 MG/ML VIAL (ML) INJECTION at 18:48:00

## 2023-06-12 RX ADMIN — SODIUM CHLORIDE: 9 INJECTION, SOLUTION INTRAVENOUS at 17:02:00

## 2023-06-12 RX ADMIN — METRONIDAZOLE 500 MG: 500 INJECTION, SOLUTION INTRAVENOUS at 17:30:00

## 2023-06-12 RX ADMIN — MIDAZOLAM HYDROCHLORIDE 2 MG: 1 INJECTION INTRAMUSCULAR; INTRAVENOUS at 15:15:00

## 2023-06-12 RX ADMIN — ROCURONIUM BROMIDE 20 MG: 10 INJECTION, SOLUTION INTRAVENOUS at 17:44:00

## 2023-06-12 RX ADMIN — ROCURONIUM BROMIDE 20 MG: 10 INJECTION, SOLUTION INTRAVENOUS at 16:32:00

## 2023-06-12 RX ADMIN — CEFOXITIN 2 G: 2 INJECTION, POWDER, FOR SOLUTION INTRAVENOUS at 15:52:00

## 2023-06-12 RX ADMIN — CEFOXITIN 2 G: 2 INJECTION, POWDER, FOR SOLUTION INTRAVENOUS at 17:40:00

## 2023-06-12 RX ADMIN — ACETAMINOPHEN 1000 MG: 10 INJECTION, SOLUTION INTRAVENOUS at 19:11:00

## 2023-06-12 RX ADMIN — ROCURONIUM BROMIDE 20 MG: 10 INJECTION, SOLUTION INTRAVENOUS at 15:49:00

## 2023-06-12 RX ADMIN — MIDAZOLAM HYDROCHLORIDE 2 MG: 1 INJECTION INTRAMUSCULAR; INTRAVENOUS at 15:13:00

## 2023-06-12 NOTE — CM/SW NOTE
Consult order received for IV ABX at DC. Referral sent, awaiting for orders. HH ref sent. Will need to verify with insurance on in office vs in home infusions. Pt discussed in rounds, plans for OR today with gensurg / neurosx. ELIF/CM to remain available for dc planning post op. Addendum 12:15- Sw received call from Arjun  with [facility] office-- pt is approved at 100% for all services. Informed [facility] AS[facility]CM will follow up with pt and office after procedure.      MARYLU Church  Discharge [address] [facility]

## 2023-06-12 NOTE — PROGRESS NOTES
Assumed care at [address] [facility]. Patient A&Ox4. Tele SR/SB, on room air. Denies pain. PRN xanax for anxiety. IV abx as ordered. Completely bowel prep. NPO midnight. Surgical plan to be determined tomorrow.

## 2023-06-12 NOTE — PLAN OF CARE
Assumed care at 0730  Alert and oriented x4  RA. NSR on tele. VSS. Denies pain  IV abx  Occipital shunt removal and laparoscopy today with neuro surg and gen surg. Pt anxious about surgery. Pt has been NPO since midnight  Wife at bedside. All questions answered. Pt heading to OR now.  Transferring to ICU after procedure

## 2023-06-12 NOTE — ANESTHESIA PROCEDURE NOTES
Airway  Date/Time: 6/12/2023 3:18 PM  Urgency: elective    Airway not difficult    General Information and Staff    Patient location during procedure: OR  Anesthesiologist: Gertrude Singleton MD  Performed: anesthesiologist   Performed by: Gertrude Singleton MD  Authorized by: Gertrude Singleton MD      Indications and Patient Condition  Indications for airway management: anesthesia  Sedation level: deep  Preoxygenated: yes  Patient position: sniffing  Mask difficulty assessment: 1 - vent by mask    Final Airway Details  Final airway type: endotracheal airway      Successful airway: ETT  Cuffed: yes   Successful intubation technique: direct laryngoscopy  Endotracheal tube insertion site: oral  Blade: Rachel  Blade size: #3  ETT size (mm): 8.0    Cormack-Lehane Classification: grade I - full view of glottis  Placement verified by: capnometry   Measured from: lips  ETT to lips (cm): 21  Number of attempts at approach: 1    Additional Comments   OETT placed without airway or dental trauma.

## 2023-06-12 NOTE — ANESTHESIA PROCEDURE NOTES
Peripheral IV  Date/Time: 6/12/2023 3:36 PM  Inserted by:  Yuliet Oquendo MD    Placement  Needle size: 18 G  Laterality: left  Location: forearm  Local anesthetic: none  Site prep: alcohol  Technique: anatomical landmarks  Attempts: 1

## 2023-06-13 ENCOUNTER — APPOINTMENT (OUTPATIENT)
Dept: GENERAL RADIOLOGY | Facility: HOSPITAL | Age: 43
End: 2023-06-13
Attending: STUDENT IN AN ORGANIZED HEALTH CARE EDUCATION/TRAINING PROGRAM
Payer: COMMERCIAL

## 2023-06-13 ENCOUNTER — APPOINTMENT (OUTPATIENT)
Dept: GENERAL RADIOLOGY | Facility: HOSPITAL | Age: 43
End: 2023-06-13
Attending: HOSPITALIST
Payer: COMMERCIAL

## 2023-06-13 ENCOUNTER — APPOINTMENT (OUTPATIENT)
Dept: GENERAL RADIOLOGY | Facility: HOSPITAL | Age: 43
DRG: 032 | End: 2023-06-13
Attending: STUDENT IN AN ORGANIZED HEALTH CARE EDUCATION/TRAINING PROGRAM
Payer: COMMERCIAL

## 2023-06-13 ENCOUNTER — APPOINTMENT (OUTPATIENT)
Dept: GENERAL RADIOLOGY | Facility: HOSPITAL | Age: 43
DRG: 032 | End: 2023-06-13
Attending: HOSPITALIST
Payer: COMMERCIAL

## 2023-06-13 LAB
ANION GAP SERPL CALC-SCNC: 5 MMOL/L (ref 0–18)
BUN BLD-MCNC: 9 MG/DL (ref 7–18)
CALCIUM BLD-MCNC: 7.9 MG/DL (ref 8.5–10.1)
CHLORIDE SERPL-SCNC: 110 MMOL/L (ref 98–112)
CO2 SERPL-SCNC: 24 MMOL/L (ref 21–32)
CREAT BLD-MCNC: 0.72 MG/DL
ERYTHROCYTE [DISTWIDTH] IN BLOOD BY AUTOMATED COUNT: 12.2 %
GFR SERPLBLD BASED ON 1.73 SQ M-ARVRAT: 118 ML/MIN/1.73M2 (ref 60–?)
GLUCOSE BLD-MCNC: 158 MG/DL (ref 70–99)
HCT VFR BLD AUTO: 39.8 %
HGB BLD-MCNC: 13.1 G/DL
MCH RBC QN AUTO: 29.4 PG (ref 26–34)
MCHC RBC AUTO-ENTMCNC: 32.9 G/DL (ref 31–37)
MCV RBC AUTO: 89.4 FL
OSMOLALITY SERPL CALC.SUM OF ELEC: 290 MOSM/KG (ref 275–295)
PLATELET # BLD AUTO: 184 10(3)UL (ref 150–450)
POTASSIUM SERPL-SCNC: 4.4 MMOL/L (ref 3.5–5.1)
RBC # BLD AUTO: 4.45 X10(6)UL
SODIUM SERPL-SCNC: 139 MMOL/L (ref 136–145)
WBC # BLD AUTO: 19.7 X10(3) UL (ref 4–11)

## 2023-06-13 PROCEDURE — 99291 CRITICAL CARE FIRST HOUR: CPT | Performed by: INTERNAL MEDICINE

## 2023-06-13 PROCEDURE — 02HV33Z INSERTION OF INFUSION DEVICE INTO SUPERIOR VENA CAVA, PERCUTANEOUS APPROACH: ICD-10-PCS | Performed by: INTERNAL MEDICINE

## 2023-06-13 PROCEDURE — 74270 X-RAY XM COLON 1CNTRST STD: CPT | Performed by: STUDENT IN AN ORGANIZED HEALTH CARE EDUCATION/TRAINING PROGRAM

## 2023-06-13 PROCEDURE — 99233 SBSQ HOSP IP/OBS HIGH 50: CPT | Performed by: INTERNAL MEDICINE

## 2023-06-13 PROCEDURE — 70250 X-RAY EXAM OF SKULL: CPT | Performed by: NURSE PRACTITIONER

## 2023-06-13 PROCEDURE — 74018 RADEX ABDOMEN 1 VIEW: CPT | Performed by: NURSE PRACTITIONER

## 2023-06-13 PROCEDURE — 71045 X-RAY EXAM CHEST 1 VIEW: CPT | Performed by: NURSE PRACTITIONER

## 2023-06-13 PROCEDURE — B548ZZA ULTRASONOGRAPHY OF SUPERIOR VENA CAVA, GUIDANCE: ICD-10-PCS | Performed by: INTERNAL MEDICINE

## 2023-06-13 PROCEDURE — 70360 X-RAY EXAM OF NECK: CPT | Performed by: NURSE PRACTITIONER

## 2023-06-13 RX ORDER — HYDROMORPHONE HYDROCHLORIDE 1 MG/ML
0.4 INJECTION, SOLUTION INTRAMUSCULAR; INTRAVENOUS; SUBCUTANEOUS EVERY 2 HOUR PRN
Status: DISCONTINUED | OUTPATIENT
Start: 2023-06-13 | End: 2023-06-15

## 2023-06-13 RX ORDER — LEVETIRACETAM 500 MG/5ML
1000 INJECTION, SOLUTION, CONCENTRATE INTRAVENOUS 2 TIMES DAILY
Status: DISCONTINUED | OUTPATIENT
Start: 2023-06-13 | End: 2023-06-18

## 2023-06-13 RX ORDER — LIDOCAINE HYDROCHLORIDE 10 MG/ML
5 INJECTION, SOLUTION EPIDURAL; INFILTRATION; INTRACAUDAL; PERINEURAL
Status: COMPLETED | OUTPATIENT
Start: 2023-06-13 | End: 2023-06-13

## 2023-06-13 RX ORDER — CLOTRIMAZOLE 10 MG/1
1 LOZENGE ORAL; TOPICAL
Status: DISCONTINUED | OUTPATIENT
Start: 2023-06-13 | End: 2023-06-22

## 2023-06-13 RX ORDER — LEVETIRACETAM 500 MG/1
500 TABLET ORAL 2 TIMES DAILY
Status: DISCONTINUED | OUTPATIENT
Start: 2023-06-13 | End: 2023-06-13

## 2023-06-13 RX ORDER — PANTOPRAZOLE SODIUM 20 MG/1
20 TABLET, DELAYED RELEASE ORAL
Status: DISCONTINUED | OUTPATIENT
Start: 2023-06-14 | End: 2023-06-23

## 2023-06-13 RX ORDER — LEVETIRACETAM 500 MG/5ML
500 INJECTION, SOLUTION, CONCENTRATE INTRAVENOUS EVERY 12 HOURS
Status: DISCONTINUED | OUTPATIENT
Start: 2023-06-13 | End: 2023-06-13

## 2023-06-13 RX ORDER — HYDROMORPHONE HYDROCHLORIDE 1 MG/ML
0.1 INJECTION, SOLUTION INTRAMUSCULAR; INTRAVENOUS; SUBCUTANEOUS EVERY 2 HOUR PRN
Status: DISCONTINUED | OUTPATIENT
Start: 2023-06-13 | End: 2023-06-15

## 2023-06-13 RX ORDER — LORAZEPAM 2 MG/ML
0.5 INJECTION INTRAMUSCULAR EVERY 6 HOURS PRN
Status: DISCONTINUED | OUTPATIENT
Start: 2023-06-13 | End: 2023-06-22

## 2023-06-13 RX ORDER — LORAZEPAM 2 MG/ML
1 INJECTION INTRAMUSCULAR EVERY 6 HOURS PRN
Status: DISCONTINUED | OUTPATIENT
Start: 2023-06-13 | End: 2023-06-22

## 2023-06-13 RX ORDER — HYDROMORPHONE HYDROCHLORIDE 1 MG/ML
0.2 INJECTION, SOLUTION INTRAMUSCULAR; INTRAVENOUS; SUBCUTANEOUS EVERY 2 HOUR PRN
Status: DISCONTINUED | OUTPATIENT
Start: 2023-06-13 | End: 2023-06-15

## 2023-06-13 NOTE — OPERATIVE REPORT
[facility]    PATIENT'S NAME: Ryann Jenkins   ATTENDING PHYSICIAN: Onur Marion M.D. OPERATING PHYSICIAN: Fanny Jackson MD   PATIENT ACCOUNT#:   [id number]    LOCATION:  [address] [place] [address]  MEDICAL RECORD #:   [id number]       YOB: 1980  ADMISSION DATE:       06/10/2023      OPERATION DATE:  06/12/2023    OPERATIVE REPORT      PREOPERATIVE DIAGNOSIS:  Ventriculoperitoneal shunt infection; right neck granuloma, open wound. POSTOPERATIVE DIAGNOSIS:  Ventriculoperitoneal shunt infection; right neck granuloma, open wound. PROCEDURE:    1. Excision of right neck open wound granuloma, skin and subcutaneous tissue. 2.   Complex layered wound closure, right neck 2 cm. ANESTHESIA:  General endotracheal anesthesia. COMPLICATION:  None. BLOOD LOSS:  Minimal.      INDICATIONS:  This is a 46-year-old gentleman with a complex history of hydrocephalus and  shunt placement as a child who has a history of shunt fracture requiring exchange and revision of part of the shunt on the right neck 1-1/2 years ago by Dr. Brinda Antony and I assisted with the wound closure. Patient now presented with recurrent open wound on his right neck, granuloma formation and shunt infection. He was seen in the office and further workup was ordered and the patient was diagnosed with eroding peritoneal catheter into his colon. He was admitted and was added on to the OR for shunt exchange. I was called by Dr. Raquel Wang to assist with the right neck open wound granuloma. I had discussed with the patient preoperatively in the office before that if a deeper infection related to the  shunt and catheter is present, he might need more extensive surgery than just excision of the granuloma. We discussed the risks, potential complications including, but not limited to, infection, bleeding, scarring, damage to surrounding structures, recurrent granuloma formation, recurrent open wound, need for further surgery.   The patient understands. OPERATIVE TECHNIQUE:  I was called into the operating room by Dr. Krystle Pineda today after his cranial portion as well as the intra-abdominal portion. At that time, I was asked to assist with excision of the granuloma around the catheter site. I assisted Dr. Krystle Pineda with removal of the catheters through several stab incisions and wound closure. The patient was prepped and draped sterilely and in stable condition when I came into the room. The area was marked and a 15 blade was used to excise the skin and subcutaneous tissue with the underlying granuloma. Then, the tunneled catheter was identified. Then, Dr. Krystle Pineda removed the catheter and part of the remaining calcified wall around the catheter was also removed. After removal of 2 other sites by Dr. Krystle Pineda, we irrigated all wounds thoroughly with Irrisept solution and a complex layered closure was performed to the right neck wound which was measuring 2 cm. This was done with 3-0 Vicryl sutures for the deep layer, followed by 5-0 Prolene sutures interrupted for the skin. Bacitracin ointment and Telfa and Tegaderm were chosen for the dressing. The patient then continued to undergo additional surgery with Dr. Krystle Pineda regarding the shunt on the left side. At that time, when I left patient was in stable condition, and all sponges and needle counts were correct. Dictated By Annalise Ingram.  Kristen Leija MD  d: 06/12/2023 20:42:38  t: 06/12/2023 65:00:57  [facility] [id number]  RVV/

## 2023-06-13 NOTE — OPERATIVE REPORT
[facility]  Operative Note    John Paul Moreland Location: OR   CSN [id number] MRN [id number]    1980 Age 38year old   Admission Date 6/10/2023 Operation Date 2023   Attending Physician Tima Fleming MD Operating Physician Judy Guaman MD   PCP Neto Cleaning DO          Patient Name: Ish Villaseñor    Preoperative Diagnosis: Infected ventriculoperitoneal shunt    Postoperative Diagnosis: Same as preop diagnosis    Primary Surgeon: Judy Guaman MD    Assistant: Chela Seay MD and Kenan Meeks MD     The assistance of Dr. Sravanthi Durán was absolutely essential to the proper conduct of this case and further assisted with lysis of adhesions and performance of the small bowel anastomosis. Anesthesia: General    Procedures: Diagnostic laparoscopy, laparoscopic lysis of adhesion converted to open, removal of abdominal portion of ventriculoperitoneal shunt, small bowel resection    Implants: None    Specimen: Portion    Drains: 19 Western Arabella Armaan    Estimated Blood Loss: 25 the    Complications: none    Condition: Stable    Indications for Surgery:   John Paul Moreland is a 38year old male who congenital hydrocephalus history with multiple ventriculoperitoneal shunt placements and revisions who presented with cerebritis on MRI. He eventually had a tap of the spinal fluid that grew Enterococcus faecalis, E. coli and Pseudomonas. CT scan of the abdomen pelvis showed the ventriculoperitoneal shunt within an enteric lumen. I discussed with him and his wife the treatment options and the need of the shunt removal.  I explained the risks and benefits of the procedure and all questions were answered. We will proceed with a diagnostic laparoscopy with removal of the  shunt with possible open laparotomy. Surgical Findings:   Severe adhesive disease including entire small and large intestine   shunt entering the small bowel    Description of Procedure:    The patient was transported to the operating room and placed on the operating table in supine position. General endotracheal anesthesia was administered. Preoperative antibiotics were given. The abdomen was prepped and draped in sterile fashion. A time-out was performed. A stab incision in the left upper quadrant was made at Quispe's point and a Veress needle was passed into the peritoneal cavity. Pneumoperitoneum was established to a pressure of 15 mmHg the Veress needle was removed and replaced by a 5 mm optical trocar that was placed under direct vision with Optiview technique. Upon entry of the peritoneal cavity there was evidence of extensive adhesive disease involving the entirety of the small bowel and colon. The left side of ventriculoperitoneal shunt appeared to enter within the midst of this mass of matted intestine and colon. Two 5 mm trocars were placed under direct vision in the right upper and right lower quadrant. Initiated performing laparoscopic lysis of adhesions was I was able to isolate the  shunt at the entry site to the bowel. At this point I was unable to definitively see the  shunt entering the intestine and thus decision made to convert to an open procedure. A lower midline incision was performed from the supraumbilical region to the suprapubic area. This was carried down with electrocautery and the fascia was opened at midline. The peritoneal cavity was entered and I initiated performing a extensive lysis of adhesions for the next 60 minutes. Once the entire small bowel was freed from the ligament of Treitz to the terminal ileum there was 1 fistulous track with the  shunt and it visualizing within the loop of ileum this was approximately 50 cm away from the terminal ileum and the ileocecal valve. In addition to that an enterotomy was noted approximately 5 cm from that area. Decision was made to resect that piece of bowel en bloc.   This measured approximately 30 cm away from the ileocecal valve. The proximal and distal transverse transection point were chosen and were transected using a blue load [place] stapler x2. A side-to-side antiperistaltic anastomosis was performed and a common channel was made using a blue load on the [place] stapler. The common enterotomy was closed using a blue load on a TA stapler. The anastomosis was checked for patency integrity and hemostasis and appeared intact and hemostatic. The mesenteric defect was closed using a 2-0 Vicryl suture in a running fashion. At this point I noted that the sigmoid colon was completely covered with a thick peritoneal rind and within the right side of the abdomen. Sigmoid colon was investigated extensively and would not find a colotomy which led to the conclusion that the shunt was not within the colon. The small bowel was run again and no injuries were identified. The anastomosis appeared intact and hemostatic. The abdominal cavity was washed copiously with 3 L of normal saline. A 19 [place] drain was placed in the right abdominal cavity. Gowns gloves were changed and the abdominal wall was closed. The peritoneum was closed using a #1 Vicryl suture. The fascia was closed at midline using a #1 PDS suture x2. The wound was irrigated and the skin was closed using staples including the 5 mm incisions for the trocars. Local anesthetic was injected to the incisions and-year-old dressing was placed. The removal of the  shunt from the head and neck was performed by Dr. Brittney Salinas and Dr. Ana Randall, please see their op reports for full details on the procedure. The patient was awakened from anesthesia and brought to the recovery room in good condition. The patient tolerated the procedure well without apparent complication. All sponge, needle, and instrument counts were correct at the end of the case.     Yuki Norris MD  6/12/2023  7:23 PM

## 2023-06-13 NOTE — CM/SW NOTE
Spoke with Kenya Nicholas @ [facility]--anticipate reaching OOP cost to be covered 100% for IV abx therapy--in office infusions, teach and train or home infusion.

## 2023-06-13 NOTE — PLAN OF CARE
Assumed care of patient from OR at approximately 2030. A&Ox4. KAUR, strength 5/5. See E charting for full neuro assessment. Room air. NPO. NG intact. Moderate abdominal pain being managed with prn medication. Externalized shunt set to [facility] LAKSHMI SOMMER per orders, no output this shift. Abdominal drain to bulb suction. IVF infusing. Bedrest. Ayala out this am. Voiding via urinal.    Problem: NEUROLOGICAL - ADULT  Goal: Achieves stable or improved neurological status  Description: INTERVENTIONS  - Assess for and report changes in neurological status  - Initiate measures to prevent increased intracranial pressure  - Maintain blood pressure and fluid volume within ordered parameters to optimize cerebral perfusion and minimize risk of hemorrhage  - Monitor temperature, glucose, and sodium.  Initiate appropriate interventions as ordered  Outcome: Progressing  Goal: Absence of seizures  Description: INTERVENTIONS  - Monitor for seizure activity  - Administer anti-seizure medications as ordered  - Monitor neurological status  Outcome: Progressing  Goal: Remains free of injury related to seizure activity  Description: INTERVENTIONS:  - Maintain airway, patient safety  and administer oxygen as ordered  - Monitor patient for seizure activity, document and report duration and description of seizure to MD/LIP  - If seizure occurs, turn patient to side and suction secretions as needed  - Reorient patient post seizure  - Seizure pads on all 4 side rails  - Instruct patient/family to notify RN of any seizure activity  - Instruct patient/family to call for assistance with activity based on assessment  Outcome: Progressing  Goal: Achieves maximal functionality and self care  Description: INTERVENTIONS  - Monitor swallowing and airway patency with patient fatigue and changes in neurological status  - Encourage and assist patient to increase activity and self care with guidance from PT/OT  - Encourage visually impaired, hearing impaired and aphasic patients to use assistive/communication devices  Outcome: Progressing

## 2023-06-13 NOTE — PROGRESS NOTES
Pt awake,alert and KAUR. Pt anxious, PRN Ativan. Dilaudid PRN for ABD pain, ABD midline incision dressing with old drainage. JERAMY drain to right ABD. Dressing to right neck, to do changed tomorrow. Externalized shunt @ 0 cmH2O,  Zero output. Shunt XRAY's done. XRAY verified NG placement. NG TUBE ADVANCED 3cm, now @ 55cm. Per Dr. Pope Constant order. A few ice chips only (see orders) PICC line placed. Abx and anti-seizure meds reviewed with MD's. Plan of care reviewed multiple times with pt and wife.

## 2023-06-13 NOTE — PROGRESS NOTES
POD #1 s/p  shunt removal, SB resection and neck granuloma excision an closure.   Pt doing well   Neck dressing CDI  A/P: per NS and gen surgery  Neck dressing can be changed tomorrow bacitracin ointment and gauze, once no drainage ok to leave open to air and apply baci BID  F u in office for suture removal in 1-2 weeks

## 2023-06-13 NOTE — BRIEF OP NOTE
Pre-Operative Diagnosis: Shunt infection     Post-Operative Diagnosis: Shunt infection     Procedure Performed:   Neurosurgery  1. Right occipital cysto-peritoneal shunt removal  2. Right sub-occipital shunt distal catheter removal  3. Left occipital ventriculoperitoneal shunt externalization at the clavicle - given need for bowel resection and peritoneal contamination  4. Left occipital ventriculoperitoneal shunt reservoir tap     General Surgery  1. Diagnostic laparoscopy  2. Exploratory laparotomy  3. Lysis of adhesions  4. Small bowel resection    Plastic Surgery  1. Right neck granuloma excision    Surgeon(s) and Role:  Panel 1:     Shruthi Sierra MD - Primary  Panel 2:     * Bianca Joseph MD - Primary     * Glendy Macias MD - Assisting Surgeon     * Josph Baumgarten, MD - Assisting Surgeon  Panel 3:     Devin Pandey MD - Primary     Surgical Findings:   1. Right occipital cysto-peritoneal shunt removal without fluid return from the proximal catheter  2. Externalized left ventriculoperitoneal shunt without CSF flow  3. Clear CSF from shunt reservoir      Specimen:   1. Right occipital cysto-peritoneal shunt proximal catheter for culture  2.  Left occipital ventriculoperitoneal shunt reservoir CSF for glucose, protein, cell count, culture     Estimated Blood Loss: 50 mL (6/12/2023  7:54 PM)    Plan:  Q1' neuro checks  Externalized left occipital shunt set to [address] post op  Will follow up cultures  Diet per general surgery - NPO for now

## 2023-06-14 ENCOUNTER — APPOINTMENT (OUTPATIENT)
Dept: CT IMAGING | Facility: HOSPITAL | Age: 43
DRG: 032 | End: 2023-06-14
Attending: STUDENT IN AN ORGANIZED HEALTH CARE EDUCATION/TRAINING PROGRAM
Payer: COMMERCIAL

## 2023-06-14 ENCOUNTER — APPOINTMENT (OUTPATIENT)
Dept: CT IMAGING | Facility: HOSPITAL | Age: 43
End: 2023-06-14
Attending: STUDENT IN AN ORGANIZED HEALTH CARE EDUCATION/TRAINING PROGRAM
Payer: COMMERCIAL

## 2023-06-14 ENCOUNTER — ANESTHESIA EVENT (OUTPATIENT)
Dept: SURGERY | Facility: HOSPITAL | Age: 43
End: 2023-06-14
Payer: COMMERCIAL

## 2023-06-14 LAB
ANION GAP SERPL CALC-SCNC: 3 MMOL/L (ref 0–18)
BUN BLD-MCNC: 8 MG/DL (ref 7–18)
CALCIUM BLD-MCNC: 8 MG/DL (ref 8.5–10.1)
CHLORIDE SERPL-SCNC: 110 MMOL/L (ref 98–112)
CO2 SERPL-SCNC: 30 MMOL/L (ref 21–32)
CREAT BLD-MCNC: 0.68 MG/DL
ERYTHROCYTE [DISTWIDTH] IN BLOOD BY AUTOMATED COUNT: 12.5 %
GFR SERPLBLD BASED ON 1.73 SQ M-ARVRAT: 121 ML/MIN/1.73M2 (ref 60–?)
GLUCOSE BLD-MCNC: 99 MG/DL (ref 70–99)
HCT VFR BLD AUTO: 35.6 %
HGB BLD-MCNC: 11.7 G/DL
MCH RBC QN AUTO: 29.7 PG (ref 26–34)
MCHC RBC AUTO-ENTMCNC: 32.9 G/DL (ref 31–37)
MCV RBC AUTO: 90.4 FL
OSMOLALITY SERPL CALC.SUM OF ELEC: 294 MOSM/KG (ref 275–295)
PLATELET # BLD AUTO: 150 10(3)UL (ref 150–450)
POTASSIUM SERPL-SCNC: 3.9 MMOL/L (ref 3.5–5.1)
RBC # BLD AUTO: 3.94 X10(6)UL
SODIUM SERPL-SCNC: 143 MMOL/L (ref 136–145)
VALPROATE SERPL-MCNC: 42.8 UG/ML (ref 50–100)
WBC # BLD AUTO: 13.4 X10(3) UL (ref 4–11)

## 2023-06-14 PROCEDURE — 99232 SBSQ HOSP IP/OBS MODERATE 35: CPT | Performed by: INTERNAL MEDICINE

## 2023-06-14 PROCEDURE — 74177 CT ABD & PELVIS W/CONTRAST: CPT | Performed by: STUDENT IN AN ORGANIZED HEALTH CARE EDUCATION/TRAINING PROGRAM

## 2023-06-14 PROCEDURE — 99291 CRITICAL CARE FIRST HOUR: CPT | Performed by: INTERNAL MEDICINE

## 2023-06-14 NOTE — PLAN OF CARE
Assumed care of patient this morning. Patient a+ox4. Segura. Follows commands. Neuro checks currently q4h. Intact. C/o pain to upper abdomen. Dilaudid ivp given per orders. NG dc'd per general surgery. Strict NPO except for gum/hard candy. IVF continued. Ambulating in halls. Steady gait noted. Externalized shunt c/d/i. No drainage. Vss. See assessment for complete details. Will continue to monitor.

## 2023-06-14 NOTE — PLAN OF CARE
Assumed care of patient at approximately 1930. A&Ox4. KAUR. Room air. NSR. NG to LIS. NPO per MD. Voiding via urinal. Xray colon done. L shunt set to 0cmH20, no output. R abdominal drain with minimal output. Problem: NEUROLOGICAL - ADULT  Goal: Achieves stable or improved neurological status  Description: INTERVENTIONS  - Assess for and report changes in neurological status  - Initiate measures to prevent increased intracranial pressure  - Maintain blood pressure and fluid volume within ordered parameters to optimize cerebral perfusion and minimize risk of hemorrhage  - Monitor temperature, glucose, and sodium.  Initiate appropriate interventions as ordered  Outcome: Progressing  Goal: Absence of seizures  Description: INTERVENTIONS  - Monitor for seizure activity  - Administer anti-seizure medications as ordered  - Monitor neurological status  Outcome: Progressing  Goal: Remains free of injury related to seizure activity  Description: INTERVENTIONS:  - Maintain airway, patient safety  and administer oxygen as ordered  - Monitor patient for seizure activity, document and report duration and description of seizure to MD/LIP  - If seizure occurs, turn patient to side and suction secretions as needed  - Reorient patient post seizure  - Seizure pads on all 4 side rails  - Instruct patient/family to notify RN of any seizure activity  - Instruct patient/family to call for assistance with activity based on assessment  Outcome: Progressing  Goal: Achieves maximal functionality and self care  Description: INTERVENTIONS  - Monitor swallowing and airway patency with patient fatigue and changes in neurological status  - Encourage and assist patient to increase activity and self care with guidance from PT/OT  - Encourage visually impaired, hearing impaired and aphasic patients to use assistive/communication devices  Outcome: Progressing

## 2023-06-14 NOTE — PLAN OF CARE
Problem: NEUROLOGICAL - ADULT  Goal: Achieves stable or improved neurological status  Description: INTERVENTIONS  - Assess for and report changes in neurological status  - Initiate measures to prevent increased intracranial pressure  - Maintain blood pressure and fluid volume within ordered parameters to optimize cerebral perfusion and minimize risk of hemorrhage  - Monitor temperature, glucose, and sodium.  Initiate appropriate interventions as ordered  Outcome: Adequate for Discharge  Goal: Absence of seizures  Description: INTERVENTIONS  - Monitor for seizure activity  - Administer anti-seizure medications as ordered  - Monitor neurological status  Outcome: Adequate for Discharge  Goal: Remains free of injury related to seizure activity  Description: INTERVENTIONS:  - Maintain airway, patient safety  and administer oxygen as ordered  - Monitor patient for seizure activity, document and report duration and description of seizure to MD/LIP  - If seizure occurs, turn patient to side and suction secretions as needed  - Reorient patient post seizure  - Seizure pads on all 4 side rails  - Instruct patient/family to notify RN of any seizure activity  - Instruct patient/family to call for assistance with activity based on assessment  Outcome: Completed  Goal: Achieves maximal functionality and self care  Description: INTERVENTIONS  - Monitor swallowing and airway patency with patient fatigue and changes in neurological status  - Encourage and assist patient to increase activity and self care with guidance from PT/OT  - Encourage visually impaired, hearing impaired and aphasic patients to use assistive/communication devices  Outcome: Adequate for Discharge

## 2023-06-15 ENCOUNTER — APPOINTMENT (OUTPATIENT)
Dept: CT IMAGING | Facility: HOSPITAL | Age: 43
DRG: 032 | End: 2023-06-15
Attending: NEUROLOGICAL SURGERY
Payer: COMMERCIAL

## 2023-06-15 ENCOUNTER — ANESTHESIA (OUTPATIENT)
Dept: SURGERY | Facility: HOSPITAL | Age: 43
End: 2023-06-15
Payer: COMMERCIAL

## 2023-06-15 ENCOUNTER — APPOINTMENT (OUTPATIENT)
Dept: GENERAL RADIOLOGY | Facility: HOSPITAL | Age: 43
End: 2023-06-15
Attending: STUDENT IN AN ORGANIZED HEALTH CARE EDUCATION/TRAINING PROGRAM
Payer: COMMERCIAL

## 2023-06-15 ENCOUNTER — APPOINTMENT (OUTPATIENT)
Dept: GENERAL RADIOLOGY | Facility: HOSPITAL | Age: 43
DRG: 032 | End: 2023-06-15
Attending: STUDENT IN AN ORGANIZED HEALTH CARE EDUCATION/TRAINING PROGRAM
Payer: COMMERCIAL

## 2023-06-15 ENCOUNTER — APPOINTMENT (OUTPATIENT)
Dept: CT IMAGING | Facility: HOSPITAL | Age: 43
End: 2023-06-15
Attending: NEUROLOGICAL SURGERY
Payer: COMMERCIAL

## 2023-06-15 PROBLEM — B99.9 INFECTION: Status: ACTIVE | Noted: 2023-06-15

## 2023-06-15 LAB
ANION GAP SERPL CALC-SCNC: 4 MMOL/L (ref 0–18)
BASOPHILS # BLD AUTO: 0.04 X10(3) UL (ref 0–0.2)
BASOPHILS NFR BLD AUTO: 0.5 %
BUN BLD-MCNC: 6 MG/DL (ref 7–18)
CALCIUM BLD-MCNC: 8.1 MG/DL (ref 8.5–10.1)
CHLORIDE SERPL-SCNC: 108 MMOL/L (ref 98–112)
CO2 SERPL-SCNC: 28 MMOL/L (ref 21–32)
CREAT BLD-MCNC: 0.55 MG/DL
EOSINOPHIL # BLD AUTO: 0.2 X10(3) UL (ref 0–0.7)
EOSINOPHIL NFR BLD AUTO: 2.3 %
ERYTHROCYTE [DISTWIDTH] IN BLOOD BY AUTOMATED COUNT: 12.2 %
GFR SERPLBLD BASED ON 1.73 SQ M-ARVRAT: 128 ML/MIN/1.73M2 (ref 60–?)
GLUCOSE BLD-MCNC: 82 MG/DL (ref 70–99)
GLUCOSE BLD-MCNC: 83 MG/DL (ref 70–99)
GLUCOSE BLD-MCNC: 95 MG/DL (ref 70–99)
HCT VFR BLD AUTO: 33.5 %
HGB BLD-MCNC: 11.1 G/DL
IMM GRANULOCYTES # BLD AUTO: 0.04 X10(3) UL (ref 0–1)
IMM GRANULOCYTES NFR BLD: 0.5 %
INR BLD: 1.23 (ref 0.85–1.16)
LYMPHOCYTES # BLD AUTO: 1.69 X10(3) UL (ref 1–4)
LYMPHOCYTES NFR BLD AUTO: 19.1 %
MCH RBC QN AUTO: 29.4 PG (ref 26–34)
MCHC RBC AUTO-ENTMCNC: 33.1 G/DL (ref 31–37)
MCV RBC AUTO: 88.9 FL
MONOCYTES # BLD AUTO: 1.19 X10(3) UL (ref 0.1–1)
MONOCYTES NFR BLD AUTO: 13.4 %
NEUTROPHILS # BLD AUTO: 5.71 X10 (3) UL (ref 1.5–7.7)
NEUTROPHILS # BLD AUTO: 5.71 X10(3) UL (ref 1.5–7.7)
NEUTROPHILS NFR BLD AUTO: 64.2 %
OSMOLALITY SERPL CALC.SUM OF ELEC: 287 MOSM/KG (ref 275–295)
PLATELET # BLD AUTO: 144 10(3)UL (ref 150–450)
POTASSIUM SERPL-SCNC: 3.7 MMOL/L (ref 3.5–5.1)
PROTHROMBIN TIME: 15.5 SECONDS (ref 11.6–14.8)
RBC # BLD AUTO: 3.77 X10(6)UL
SODIUM SERPL-SCNC: 140 MMOL/L (ref 136–145)
VALPROATE SERPL-MCNC: 45.7 UG/ML (ref 50–100)
WBC # BLD AUTO: 8.9 X10(3) UL (ref 4–11)

## 2023-06-15 PROCEDURE — 99291 CRITICAL CARE FIRST HOUR: CPT | Performed by: INTERNAL MEDICINE

## 2023-06-15 PROCEDURE — 3074F SYST BP LT 130 MM HG: CPT | Performed by: INTERNAL MEDICINE

## 2023-06-15 PROCEDURE — 99232 SBSQ HOSP IP/OBS MODERATE 35: CPT | Performed by: INTERNAL MEDICINE

## 2023-06-15 PROCEDURE — 3078F DIAST BP <80 MM HG: CPT | Performed by: INTERNAL MEDICINE

## 2023-06-15 PROCEDURE — 70450 CT HEAD/BRAIN W/O DYE: CPT | Performed by: NEUROLOGICAL SURGERY

## 2023-06-15 PROCEDURE — 74018 RADEX ABDOMEN 1 VIEW: CPT | Performed by: STUDENT IN AN ORGANIZED HEALTH CARE EDUCATION/TRAINING PROGRAM

## 2023-06-15 RX ORDER — ROCURONIUM BROMIDE 10 MG/ML
INJECTION, SOLUTION INTRAVENOUS AS NEEDED
Status: DISCONTINUED | OUTPATIENT
Start: 2023-06-15 | End: 2023-06-15 | Stop reason: SURG

## 2023-06-15 RX ORDER — HYDROMORPHONE HYDROCHLORIDE 1 MG/ML
0.4 INJECTION, SOLUTION INTRAMUSCULAR; INTRAVENOUS; SUBCUTANEOUS EVERY 2 HOUR PRN
Status: DISCONTINUED | OUTPATIENT
Start: 2023-06-15 | End: 2023-06-18

## 2023-06-15 RX ORDER — ACETAMINOPHEN 500 MG
1000 TABLET ORAL ONCE AS NEEDED
Status: ACTIVE | OUTPATIENT
Start: 2023-06-15 | End: 2023-06-15

## 2023-06-15 RX ORDER — HYDROMORPHONE HYDROCHLORIDE 1 MG/ML
0.8 INJECTION, SOLUTION INTRAMUSCULAR; INTRAVENOUS; SUBCUTANEOUS EVERY 2 HOUR PRN
Status: DISCONTINUED | OUTPATIENT
Start: 2023-06-15 | End: 2023-06-18

## 2023-06-15 RX ORDER — METOCLOPRAMIDE HYDROCHLORIDE 5 MG/ML
INJECTION INTRAMUSCULAR; INTRAVENOUS AS NEEDED
Status: DISCONTINUED | OUTPATIENT
Start: 2023-06-15 | End: 2023-06-15 | Stop reason: SURG

## 2023-06-15 RX ORDER — MIDAZOLAM HYDROCHLORIDE 1 MG/ML
1 INJECTION INTRAMUSCULAR; INTRAVENOUS EVERY 5 MIN PRN
Status: ACTIVE | OUTPATIENT
Start: 2023-06-15 | End: 2023-06-16

## 2023-06-15 RX ORDER — ONDANSETRON 2 MG/ML
4 INJECTION INTRAMUSCULAR; INTRAVENOUS EVERY 6 HOURS PRN
Status: DISCONTINUED | OUTPATIENT
Start: 2023-06-15 | End: 2023-06-23

## 2023-06-15 RX ORDER — LABETALOL HYDROCHLORIDE 5 MG/ML
5 INJECTION, SOLUTION INTRAVENOUS EVERY 5 MIN PRN
Status: ACTIVE | OUTPATIENT
Start: 2023-06-15 | End: 2023-06-16

## 2023-06-15 RX ORDER — CEFAZOLIN SODIUM/WATER 2 G/20 ML
SYRINGE (ML) INTRAVENOUS AS NEEDED
Status: DISCONTINUED | OUTPATIENT
Start: 2023-06-15 | End: 2023-06-15 | Stop reason: SURG

## 2023-06-15 RX ORDER — MIDAZOLAM HYDROCHLORIDE 1 MG/ML
INJECTION INTRAMUSCULAR; INTRAVENOUS AS NEEDED
Status: DISCONTINUED | OUTPATIENT
Start: 2023-06-15 | End: 2023-06-15 | Stop reason: SURG

## 2023-06-15 RX ORDER — HYDROCODONE BITARTRATE AND ACETAMINOPHEN 10; 325 MG/1; MG/1
1 TABLET ORAL ONCE AS NEEDED
Status: ACTIVE | OUTPATIENT
Start: 2023-06-15 | End: 2023-06-15

## 2023-06-15 RX ORDER — SODIUM CHLORIDE, SODIUM LACTATE, POTASSIUM CHLORIDE, CALCIUM CHLORIDE 600; 310; 30; 20 MG/100ML; MG/100ML; MG/100ML; MG/100ML
INJECTION, SOLUTION INTRAVENOUS CONTINUOUS
Status: DISCONTINUED | OUTPATIENT
Start: 2023-06-15 | End: 2023-06-18

## 2023-06-15 RX ORDER — NALOXONE HYDROCHLORIDE 0.4 MG/ML
80 INJECTION, SOLUTION INTRAMUSCULAR; INTRAVENOUS; SUBCUTANEOUS AS NEEDED
Status: ACTIVE | OUTPATIENT
Start: 2023-06-15 | End: 2023-06-16

## 2023-06-15 RX ORDER — LIDOCAINE HYDROCHLORIDE AND EPINEPHRINE 10; 10 MG/ML; UG/ML
INJECTION, SOLUTION INFILTRATION; PERINEURAL AS NEEDED
Status: DISCONTINUED | OUTPATIENT
Start: 2023-06-15 | End: 2023-06-15 | Stop reason: HOSPADM

## 2023-06-15 RX ORDER — DEXAMETHASONE SODIUM PHOSPHATE 4 MG/ML
VIAL (ML) INJECTION AS NEEDED
Status: DISCONTINUED | OUTPATIENT
Start: 2023-06-15 | End: 2023-06-15 | Stop reason: SURG

## 2023-06-15 RX ORDER — MEPERIDINE HYDROCHLORIDE 25 MG/ML
12.5 INJECTION INTRAMUSCULAR; INTRAVENOUS; SUBCUTANEOUS AS NEEDED
Status: DISCONTINUED | OUTPATIENT
Start: 2023-06-15 | End: 2023-06-18

## 2023-06-15 RX ORDER — SODIUM CHLORIDE 9 MG/ML
INJECTION, SOLUTION INTRAVENOUS CONTINUOUS PRN
Status: DISCONTINUED | OUTPATIENT
Start: 2023-06-15 | End: 2023-06-15 | Stop reason: SURG

## 2023-06-15 RX ORDER — ONDANSETRON 2 MG/ML
4 INJECTION INTRAMUSCULAR; INTRAVENOUS EVERY 6 HOURS PRN
Status: DISCONTINUED | OUTPATIENT
Start: 2023-06-15 | End: 2023-06-20 | Stop reason: HOSPADM

## 2023-06-15 RX ORDER — ACETAMINOPHEN 10 MG/ML
1000 INJECTION, SOLUTION INTRAVENOUS EVERY 6 HOURS PRN
Status: DISCONTINUED | OUTPATIENT
Start: 2023-06-15 | End: 2023-06-21

## 2023-06-15 RX ORDER — HYDROMORPHONE HYDROCHLORIDE 1 MG/ML
0.4 INJECTION, SOLUTION INTRAMUSCULAR; INTRAVENOUS; SUBCUTANEOUS EVERY 5 MIN PRN
Status: ACTIVE | OUTPATIENT
Start: 2023-06-15 | End: 2023-06-16

## 2023-06-15 RX ORDER — PROCHLORPERAZINE EDISYLATE 5 MG/ML
5 INJECTION INTRAMUSCULAR; INTRAVENOUS EVERY 8 HOURS PRN
Status: DISCONTINUED | OUTPATIENT
Start: 2023-06-15 | End: 2023-06-20 | Stop reason: HOSPADM

## 2023-06-15 RX ORDER — HYDROCODONE BITARTRATE AND ACETAMINOPHEN 10; 325 MG/1; MG/1
2 TABLET ORAL ONCE AS NEEDED
Status: ACTIVE | OUTPATIENT
Start: 2023-06-15 | End: 2023-06-15

## 2023-06-15 RX ORDER — ONDANSETRON 2 MG/ML
INJECTION INTRAMUSCULAR; INTRAVENOUS AS NEEDED
Status: DISCONTINUED | OUTPATIENT
Start: 2023-06-15 | End: 2023-06-15 | Stop reason: SURG

## 2023-06-15 RX ORDER — HYDROMORPHONE HYDROCHLORIDE 1 MG/ML
0.6 INJECTION, SOLUTION INTRAMUSCULAR; INTRAVENOUS; SUBCUTANEOUS EVERY 5 MIN PRN
Status: ACTIVE | OUTPATIENT
Start: 2023-06-15 | End: 2023-06-16

## 2023-06-15 RX ORDER — HYDROMORPHONE HYDROCHLORIDE 1 MG/ML
0.2 INJECTION, SOLUTION INTRAMUSCULAR; INTRAVENOUS; SUBCUTANEOUS EVERY 5 MIN PRN
Status: DISPENSED | OUTPATIENT
Start: 2023-06-15 | End: 2023-06-16

## 2023-06-15 RX ORDER — LIDOCAINE HYDROCHLORIDE 10 MG/ML
INJECTION, SOLUTION EPIDURAL; INFILTRATION; INTRACAUDAL; PERINEURAL AS NEEDED
Status: DISCONTINUED | OUTPATIENT
Start: 2023-06-15 | End: 2023-06-15 | Stop reason: SURG

## 2023-06-15 RX ADMIN — CEFAZOLIN SODIUM/WATER 2 G: 2 G/20 ML SYRINGE (ML) INTRAVENOUS at 15:00:00

## 2023-06-15 RX ADMIN — ONDANSETRON 4 MG: 2 INJECTION INTRAMUSCULAR; INTRAVENOUS at 15:58:00

## 2023-06-15 RX ADMIN — ROCURONIUM BROMIDE 20 MG: 10 INJECTION, SOLUTION INTRAVENOUS at 15:02:00

## 2023-06-15 RX ADMIN — MIDAZOLAM HYDROCHLORIDE 2 MG: 1 INJECTION INTRAMUSCULAR; INTRAVENOUS at 14:47:00

## 2023-06-15 RX ADMIN — DEXAMETHASONE SODIUM PHOSPHATE 8 MG: 4 MG/ML VIAL (ML) INJECTION at 14:50:00

## 2023-06-15 RX ADMIN — LIDOCAINE HYDROCHLORIDE 80 MG: 10 INJECTION, SOLUTION EPIDURAL; INFILTRATION; INTRACAUDAL; PERINEURAL at 14:50:00

## 2023-06-15 RX ADMIN — METOCLOPRAMIDE HYDROCHLORIDE 10 MG: 5 INJECTION INTRAMUSCULAR; INTRAVENOUS at 15:58:00

## 2023-06-15 RX ADMIN — SODIUM CHLORIDE: 9 INJECTION, SOLUTION INTRAVENOUS at 14:47:00

## 2023-06-15 NOTE — PLAN OF CARE
Assumed care of pt this evening. Neuro checks Q4hr. No deficits. External  shunt intact. XR abdomen completed. Abdominal incision pain managed well w/ dilaudid. SBP parameters met. Ambulating halls w/ ease. Plan for pt to go to OR for left shunt removal. Pt & family updated on POC. Problem: Patient/Family Goals  Goal: Patient/Family Long Term Goal  Description: Patient's Long Term Goal: minimize abdomen incisional pain after walks    Interventions:  - PRN medications  - rest between ambulation  - See additional Care Plan goals for specific interventions  Outcome: Progressing  Goal: Patient/Family Short Term Goal  Description: Patient's Short Term Goal: pass gas    Interventions:   - ambulation, PRN medications  - start diet when cleared by gen surg  - See additional Care Plan goals for specific interventions  Outcome: Progressing     Problem: NEUROLOGICAL - ADULT  Goal: Achieves stable or improved neurological status  Description: INTERVENTIONS  - Assess for and report changes in neurological status  - Initiate measures to prevent increased intracranial pressure  - Maintain blood pressure and fluid volume within ordered parameters to optimize cerebral perfusion and minimize risk of hemorrhage  - Monitor temperature, glucose, and sodium.  Initiate appropriate interventions as ordered  Outcome: Progressing  Goal: Absence of seizures  Description: INTERVENTIONS  - Monitor for seizure activity  - Administer anti-seizure medications as ordered  - Monitor neurological status  Outcome: Progressing  Goal: Achieves maximal functionality and self care  Description: INTERVENTIONS  - Monitor swallowing and airway patency with patient fatigue and changes in neurological status  - Encourage and assist patient to increase activity and self care with guidance from PT/OT  - Encourage visually impaired, hearing impaired and aphasic patients to use assistive/communication devices  Outcome: Progressing     Problem: PAIN - ADULT  Goal: Verbalizes/displays adequate comfort level or patient's stated pain goal  Description: INTERVENTIONS:  - Encourage pt to monitor pain and request assistance  - Assess pain using appropriate pain scale  - Administer analgesics based on type and severity of pain and evaluate response  - Implement non-pharmacological measures as appropriate and evaluate response  - Consider cultural and social influences on pain and pain management  - Manage/alleviate anxiety  - Utilize distraction and/or relaxation techniques  - Monitor for opioid side effects  - Notify MD/LIP if interventions unsuccessful or patient reports new pain  - Anticipate increased pain with activity and pre-medicate as appropriate  Outcome: Progressing     Problem: RISK FOR INFECTION - ADULT  Goal: Absence of fever/infection during anticipated neutropenic period  Description: INTERVENTIONS  - Monitor WBC  - Administer growth factors as ordered  - Implement neutropenic guidelines  Outcome: Progressing     Problem: SAFETY ADULT - FALL  Goal: Free from fall injury  Description: INTERVENTIONS:  - Assess pt frequently for physical needs  - Identify cognitive and physical deficits and behaviors that affect risk of falls.   - [place] fall precautions as indicated by assessment.  - Educate pt/family on patient safety including physical limitations  - Instruct pt to call for assistance with activity based on assessment  - Modify environment to reduce risk of injury  - Provide assistive devices as appropriate  - Consider OT/PT consult to assist with strengthening/mobility  - Encourage toileting schedule  Outcome: Progressing     Problem: DISCHARGE PLANNING  Goal: Discharge to home or other facility with appropriate resources  Description: INTERVENTIONS:  - Identify barriers to discharge w/pt and caregiver  - Include patient/family/discharge partner in discharge planning  - Arrange for needed discharge resources and transportation as appropriate  - Identify discharge learning needs (meds, wound care, etc)  - Arrange for interpreters to assist at discharge as needed  - Consider post-discharge preferences of patient/family/discharge partner  - Complete POLST form as appropriate  - Assess patient's ability to be responsible for managing their own health  - Refer to Case Management Department for coordinating discharge planning if the patient needs post-hospital services based on physician/LIP order or complex needs related to functional status, cognitive ability or social support system  Outcome: Progressing     Problem: Altered Communication/Language Barrier  Goal: Patient/Family is able to understand and participate in their care  Description: Interventions:  - Assess communication ability and preferred communication style  - Implement communication aides and strategies  - Use visual cues when possible  - Listen attentively, be patient, do not interrupt  - Minimize distractions  - Allow time for understanding and response  - Establish method for patient to ask for assistance (call light)  - Provide an  as needed  - Communicate barriers and strategies to overcome with those who interact with patient  Outcome: Progressing

## 2023-06-15 NOTE — ANESTHESIA PROCEDURE NOTES
Airway  Date/Time: 6/15/2023 2:52 PM  Urgency: elective    Airway not difficult    General Information and Staff    Patient location during procedure: OR  Anesthesiologist: Wade Rudd MD  Performed: anesthesiologist   Performed by: Wade Rudd MD  Authorized by: Wade Rudd MD      Indications and Patient Condition  Indications for airway management: anesthesia  Sedation level: deep  Preoxygenated: yes  Patient position: sniffing  Mask difficulty assessment: 1 - vent by mask    Final Airway Details  Final airway type: endotracheal airway      Successful airway: ETT  Cuffed: yes   Successful intubation technique: direct laryngoscopy  Endotracheal tube insertion site: oral  Blade: Rachel  Blade size: #4  ETT size (mm): 7.0    Cormack-Lehane Classification: grade I - full view of glottis  Placement verified by: capnometry   Measured from: lips  ETT to lips (cm): 21  Number of attempts at approach: 1

## 2023-06-15 NOTE — PLAN OF CARE
Assumed patient care at 1930, VS stable on RA, reports pain 6/10 after ambulation, PRN IV dilaudid given with relief noted. Patient slept well overnight. Q4h neuro assessments consistent; A/O x4, KAUR with no deficits noted, no numbness/tingling present. Patient updated family via cell phone, plans for OR today, pt NPO. No further concerns at this time, RN to monitor. Problem: Patient/Family Goals  Goal: Patient/Family Long Term Goal  Description: Patient's Long Term Goal: minimize abdomen incisional pain after walks    Interventions:  - PRN medications  - rest between ambulation  - See additional Care Plan goals for specific interventions  Outcome: Progressing  Goal: Patient/Family Short Term Goal  Description: Patient's Short Term Goal: pass gas    Interventions:   - ambulation, PRN medications  - start diet when cleared by gen surg  - See additional Care Plan goals for specific interventions  Outcome: Progressing     Problem: NEUROLOGICAL - ADULT  Goal: Achieves stable or improved neurological status  Description: INTERVENTIONS  - Assess for and report changes in neurological status  - Initiate measures to prevent increased intracranial pressure  - Maintain blood pressure and fluid volume within ordered parameters to optimize cerebral perfusion and minimize risk of hemorrhage  - Monitor temperature, glucose, and sodium.  Initiate appropriate interventions as ordered  Outcome: Progressing  Goal: Absence of seizures  Description: INTERVENTIONS  - Monitor for seizure activity  - Administer anti-seizure medications as ordered  - Monitor neurological status  Outcome: Progressing  Goal: Achieves maximal functionality and self care  Description: INTERVENTIONS  - Monitor swallowing and airway patency with patient fatigue and changes in neurological status  - Encourage and assist patient to increase activity and self care with guidance from PT/OT  - Encourage visually impaired, hearing impaired and aphasic patients to use assistive/communication devices  Outcome: Progressing     Problem: PAIN - ADULT  Goal: Verbalizes/displays adequate comfort level or patient's stated pain goal  Description: INTERVENTIONS:  - Encourage pt to monitor pain and request assistance  - Assess pain using appropriate pain scale  - Administer analgesics based on type and severity of pain and evaluate response  - Implement non-pharmacological measures as appropriate and evaluate response  - Consider cultural and social influences on pain and pain management  - Manage/alleviate anxiety  - Utilize distraction and/or relaxation techniques  - Monitor for opioid side effects  - Notify MD/LIP if interventions unsuccessful or patient reports new pain  - Anticipate increased pain with activity and pre-medicate as appropriate  Outcome: Progressing     Problem: RISK FOR INFECTION - ADULT  Goal: Absence of fever/infection during anticipated neutropenic period  Description: INTERVENTIONS  - Monitor WBC  - Administer growth factors as ordered  - Implement neutropenic guidelines  Outcome: Progressing     Problem: SAFETY ADULT - FALL  Goal: Free from fall injury  Description: INTERVENTIONS:  - Assess pt frequently for physical needs  - Identify cognitive and physical deficits and behaviors that affect risk of falls.   - [place] fall precautions as indicated by assessment.  - Educate pt/family on patient safety including physical limitations  - Instruct pt to call for assistance with activity based on assessment  - Modify environment to reduce risk of injury  - Provide assistive devices as appropriate  - Consider OT/PT consult to assist with strengthening/mobility  - Encourage toileting schedule  Outcome: Progressing     Problem: DISCHARGE PLANNING  Goal: Discharge to home or other facility with appropriate resources  Description: INTERVENTIONS:  - Identify barriers to discharge w/pt and caregiver  - Include patient/family/discharge partner in discharge planning  - Arrange for needed discharge resources and transportation as appropriate  - Identify discharge learning needs (meds, wound care, etc)  - Arrange for interpreters to assist at discharge as needed  - Consider post-discharge preferences of patient/family/discharge partner  - Complete POLST form as appropriate  - Assess patient's ability to be responsible for managing their own health  - Refer to Case Management Department for coordinating discharge planning if the patient needs post-hospital services based on physician/LIP order or complex needs related to functional status, cognitive ability or social support system  Outcome: Progressing     Problem: Altered Communication/Language Barrier  Goal: Patient/Family is able to understand and participate in their care  Description: Interventions:  - Assess communication ability and preferred communication style  - Implement communication aides and strategies  - Use visual cues when possible  - Listen attentively, be patient, do not interrupt  - Minimize distractions  - Allow time for understanding and response  - Establish method for patient to ask for assistance (call light)  - Provide an  as needed  - Communicate barriers and strategies to overcome with those who interact with patient  Outcome: Progressing

## 2023-06-16 ENCOUNTER — APPOINTMENT (OUTPATIENT)
Dept: CT IMAGING | Facility: HOSPITAL | Age: 43
DRG: 032 | End: 2023-06-16
Attending: PHYSICIAN ASSISTANT
Payer: COMMERCIAL

## 2023-06-16 ENCOUNTER — APPOINTMENT (OUTPATIENT)
Dept: CT IMAGING | Facility: HOSPITAL | Age: 43
End: 2023-06-16
Attending: PHYSICIAN ASSISTANT
Payer: COMMERCIAL

## 2023-06-16 LAB
ALBUMIN SERPL-MCNC: 2.5 G/DL (ref 3.4–5)
ALBUMIN/GLOB SERPL: 0.8 {RATIO} (ref 1–2)
ALP LIVER SERPL-CCNC: 30 U/L
ALT SERPL-CCNC: 14 U/L
ANION GAP SERPL CALC-SCNC: 7 MMOL/L (ref 0–18)
AST SERPL-CCNC: 18 U/L (ref 15–37)
BILIRUB SERPL-MCNC: 0.5 MG/DL (ref 0.1–2)
BUN BLD-MCNC: 6 MG/DL (ref 7–18)
CALCIUM BLD-MCNC: 8.2 MG/DL (ref 8.5–10.1)
CHLORIDE SERPL-SCNC: 105 MMOL/L (ref 98–112)
CO2 SERPL-SCNC: 25 MMOL/L (ref 21–32)
CREAT BLD-MCNC: 0.53 MG/DL
ERYTHROCYTE [DISTWIDTH] IN BLOOD BY AUTOMATED COUNT: 11.7 %
GFR SERPLBLD BASED ON 1.73 SQ M-ARVRAT: 130 ML/MIN/1.73M2 (ref 60–?)
GLOBULIN PLAS-MCNC: 3.2 G/DL (ref 2.8–4.4)
GLUCOSE BLD-MCNC: 107 MG/DL (ref 70–99)
GLUCOSE BLD-MCNC: 109 MG/DL (ref 70–99)
GLUCOSE BLD-MCNC: 147 MG/DL (ref 70–99)
GLUCOSE BLD-MCNC: 93 MG/DL (ref 70–99)
GLUCOSE BLD-MCNC: 97 MG/DL (ref 70–99)
HCT VFR BLD AUTO: 33.2 %
HGB BLD-MCNC: 10.9 G/DL
MCH RBC QN AUTO: 29.2 PG (ref 26–34)
MCHC RBC AUTO-ENTMCNC: 32.8 G/DL (ref 31–37)
MCV RBC AUTO: 89 FL
OSMOLALITY SERPL CALC.SUM OF ELEC: 282 MOSM/KG (ref 275–295)
PLATELET # BLD AUTO: 177 10(3)UL (ref 150–450)
POTASSIUM SERPL-SCNC: 3.9 MMOL/L (ref 3.5–5.1)
PROT SERPL-MCNC: 5.7 G/DL (ref 6.4–8.2)
RBC # BLD AUTO: 3.73 X10(6)UL
SODIUM SERPL-SCNC: 137 MMOL/L (ref 136–145)
VALPROATE SERPL-MCNC: 53.9 UG/ML (ref 50–100)
WBC # BLD AUTO: 9.7 X10(3) UL (ref 4–11)

## 2023-06-16 PROCEDURE — 99291 CRITICAL CARE FIRST HOUR: CPT | Performed by: INTERNAL MEDICINE

## 2023-06-16 PROCEDURE — 70450 CT HEAD/BRAIN W/O DYE: CPT | Performed by: PHYSICIAN ASSISTANT

## 2023-06-16 PROCEDURE — 99232 SBSQ HOSP IP/OBS MODERATE 35: CPT | Performed by: INTERNAL MEDICINE

## 2023-06-16 RX ORDER — NALOXONE HYDROCHLORIDE 4 MG/.1ML
4 SPRAY NASAL AS NEEDED
Qty: 1 KIT | Refills: 0 | Status: SHIPPED | OUTPATIENT
Start: 2023-06-16

## 2023-06-16 RX ORDER — OXYCODONE HYDROCHLORIDE 5 MG/1
5 TABLET ORAL EVERY 4 HOURS PRN
Qty: 40 TABLET | Refills: 0 | Status: SHIPPED | OUTPATIENT
Start: 2023-06-16 | End: 2023-07-03

## 2023-06-16 RX ORDER — MULTIPLE VITAMINS W/ MINERALS TAB 9MG-400MCG
1 TAB ORAL DAILY
Status: DISCONTINUED | OUTPATIENT
Start: 2023-06-16 | End: 2023-06-23

## 2023-06-16 RX ORDER — BISACODYL 10 MG
10 SUPPOSITORY, RECTAL RECTAL ONCE
Status: COMPLETED | OUTPATIENT
Start: 2023-06-16 | End: 2023-06-16

## 2023-06-16 RX ORDER — SIMETHICONE 80 MG
80 TABLET,CHEWABLE ORAL
Status: DISCONTINUED | OUTPATIENT
Start: 2023-06-16 | End: 2023-06-22

## 2023-06-16 RX ORDER — ACETAMINOPHEN 500 MG
1000 TABLET ORAL EVERY 6 HOURS PRN
Qty: 60 TABLET | Refills: 0 | Status: SHIPPED | OUTPATIENT
Start: 2023-06-16

## 2023-06-16 NOTE — PLAN OF CARE
Assumed patient care at [phone], VS stable overnight on RA and IVF. Hourly neuro assessments; A/O x4 although patient reported his memory seemed to \"be a blur\", KAUR 5/5 strength, no drift, no N/T, pupils equal, no facial droop. L EVD present, open at 10. Abdominal JERAMY drain present, staples mid abdomen. Pain reported with increased movement of abdomen, PRN medications given with relief. Patient became anxious due to gas pains in abdomen, bowel sounds present, one time order of dulcolax suppository given, patient able to have BM overnight. Head CT completed in AM, see results tab. Wife updated on patient status, questions answered. No further concerns at this time, RN to monitor. Problem: Patient/Family Goals  Goal: Patient/Family Long Term Goal  Description: Patient's Long Term Goal: minimize abdomen incisional pain after walks    Interventions:  - PRN medications  - rest between ambulation  - See additional Care Plan goals for specific interventions  Outcome: Progressing  Goal: Patient/Family Short Term Goal  Description: Patient's Short Term Goal: pass gas    Interventions:   - ambulation, PRN medications  - start diet when cleared by gen surg  - See additional Care Plan goals for specific interventions  Outcome: Progressing     Problem: NEUROLOGICAL - ADULT  Goal: Achieves stable or improved neurological status  Description: INTERVENTIONS  - Assess for and report changes in neurological status  - Initiate measures to prevent increased intracranial pressure  - Maintain blood pressure and fluid volume within ordered parameters to optimize cerebral perfusion and minimize risk of hemorrhage  - Monitor temperature, glucose, and sodium.  Initiate appropriate interventions as ordered  Outcome: Progressing  Goal: Absence of seizures  Description: INTERVENTIONS  - Monitor for seizure activity  - Administer anti-seizure medications as ordered  - Monitor neurological status  Outcome: Progressing  Goal: Achieves maximal functionality and self care  Description: INTERVENTIONS  - Monitor swallowing and airway patency with patient fatigue and changes in neurological status  - Encourage and assist patient to increase activity and self care with guidance from PT/OT  - Encourage visually impaired, hearing impaired and aphasic patients to use assistive/communication devices  Outcome: Progressing     Problem: PAIN - ADULT  Goal: Verbalizes/displays adequate comfort level or patient's stated pain goal  Description: INTERVENTIONS:  - Encourage pt to monitor pain and request assistance  - Assess pain using appropriate pain scale  - Administer analgesics based on type and severity of pain and evaluate response  - Implement non-pharmacological measures as appropriate and evaluate response  - Consider cultural and social influences on pain and pain management  - Manage/alleviate anxiety  - Utilize distraction and/or relaxation techniques  - Monitor for opioid side effects  - Notify MD/LIP if interventions unsuccessful or patient reports new pain  - Anticipate increased pain with activity and pre-medicate as appropriate  Outcome: Progressing     Problem: RISK FOR INFECTION - ADULT  Goal: Absence of fever/infection during anticipated neutropenic period  Description: INTERVENTIONS  - Monitor WBC  - Administer growth factors as ordered  - Implement neutropenic guidelines  Outcome: Progressing     Problem: SAFETY ADULT - FALL  Goal: Free from fall injury  Description: INTERVENTIONS:  - Assess pt frequently for physical needs  - Identify cognitive and physical deficits and behaviors that affect risk of falls.   - [place] fall precautions as indicated by assessment.  - Educate pt/family on patient safety including physical limitations  - Instruct pt to call for assistance with activity based on assessment  - Modify environment to reduce risk of injury  - Provide assistive devices as appropriate  - Consider OT/PT consult to assist with strengthening/mobility  - Encourage toileting schedule  Outcome: Progressing     Problem: DISCHARGE PLANNING  Goal: Discharge to home or other facility with appropriate resources  Description: INTERVENTIONS:  - Identify barriers to discharge w/pt and caregiver  - Include patient/family/discharge partner in discharge planning  - Arrange for needed discharge resources and transportation as appropriate  - Identify discharge learning needs (meds, wound care, etc)  - Arrange for interpreters to assist at discharge as needed  - Consider post-discharge preferences of patient/family/discharge partner  - Complete POLST form as appropriate  - Assess patient's ability to be responsible for managing their own health  - Refer to Case Management Department for coordinating discharge planning if the patient needs post-hospital services based on physician/LIP order or complex needs related to functional status, cognitive ability or social support system  Outcome: Progressing     Problem: Altered Communication/Language Barrier  Goal: Patient/Family is able to understand and participate in their care  Description: Interventions:  - Assess communication ability and preferred communication style  - Implement communication aides and strategies  - Use visual cues when possible  - Listen attentively, be patient, do not interrupt  - Minimize distractions  - Allow time for understanding and response  - Establish method for patient to ask for assistance (call light)  - Provide an  as needed  - Communicate barriers and strategies to overcome with those who interact with patient  Outcome: Progressing

## 2023-06-16 NOTE — CM/SW NOTE
Met with patient and spouse to update discharge plan. Explained option for IV abx--both pt/spouse interested in infusions and have selected [facility] @ discharge.   Updates sent to [facility] in [address] [place] [address]

## 2023-06-16 NOTE — PLAN OF CARE
Received this a.m., awake, answers questions appropriately but is forgetful with situation, will keep asking what has happened to him and why he is there. Had one episode of being woken up this evening and could not recall his name or birthday, rest of assessment was unchanged, notified neurosurgery, will continue to monitor. EVD clamped this morning for small period of time but then decided by neurosurgery to place EVD at [facility] and keep open. EVD clamped when working with therapy. Clear liquid started and tolerated. General surgery at bedside this afternoon and advanced diet to soft. Used only tylenol for pain management of headache today. Plan of care updated with patient and wife, questions answered, verbalized understanding.

## 2023-06-17 PROCEDURE — 99232 SBSQ HOSP IP/OBS MODERATE 35: CPT | Performed by: INTERNAL MEDICINE

## 2023-06-17 NOTE — PLAN OF CARE
Assumed care of pt around 1930. A/oX3-4- can sometimes have short episodes of confusion when first waking up from sleep- neurosurgery aware. EVD at [facility] draining 0-15cc with ICP 3-9. Dr Ruthy Garcia at bedside around [phone] with verbal orders to change neuro checks to Q4 hours with hourly EVD/ICP checks.

## 2023-06-17 NOTE — PLAN OF CARE
Up to bathroom frequently, ambulating halls without difficulty. Very forgetful with short term memory asking a lot of the same questions repeatedly. Emotional today. EVD at 5, ICP 0-7, output 1-15/hr. JERAMY to bulb suction. SR on monitors. SBP maintained 110-150. Wife at bedside today.

## 2023-06-18 ENCOUNTER — APPOINTMENT (OUTPATIENT)
Dept: GENERAL RADIOLOGY | Facility: HOSPITAL | Age: 43
DRG: 032 | End: 2023-06-18
Attending: STUDENT IN AN ORGANIZED HEALTH CARE EDUCATION/TRAINING PROGRAM
Payer: COMMERCIAL

## 2023-06-18 ENCOUNTER — APPOINTMENT (OUTPATIENT)
Dept: GENERAL RADIOLOGY | Facility: HOSPITAL | Age: 43
End: 2023-06-18
Attending: STUDENT IN AN ORGANIZED HEALTH CARE EDUCATION/TRAINING PROGRAM
Payer: COMMERCIAL

## 2023-06-18 PROCEDURE — 74018 RADEX ABDOMEN 1 VIEW: CPT | Performed by: STUDENT IN AN ORGANIZED HEALTH CARE EDUCATION/TRAINING PROGRAM

## 2023-06-18 PROCEDURE — 99232 SBSQ HOSP IP/OBS MODERATE 35: CPT | Performed by: INTERNAL MEDICINE

## 2023-06-18 RX ORDER — LEVETIRACETAM 500 MG/1
1000 TABLET ORAL 2 TIMES DAILY
Status: DISCONTINUED | OUTPATIENT
Start: 2023-06-18 | End: 2023-06-23

## 2023-06-18 NOTE — PROGRESS NOTES
[facility] Pharmacy Note: Route Optimization for Valproate Injection    Patient is currently on valproate IVPB 750 mg every 8 hours. The patient meets the criteria to convert to the oral equivalent as established by the IV to Oral conversion protocol approved by the P&T committee. Medication was changed from IV formulation to divalproex  mg PO every 8 hours per protocol.       Claudette Elizabeth PharmD  6/18/2023,  10:53 AM

## 2023-06-18 NOTE — PROGRESS NOTES
[facility] Pharmacy Note: Route Optimization for Levetiracetam (KEPPRA)    Patient is currently on levetiracetam (KEPPRA) 1000 mg IV every 12 hours. The patient meets the criteria to convert to the oral equivalent as established by the IV to Oral conversion protocol approved by the P&T committee. Medication was changed from IV formulation to Levetiracetam (KEPPRA) 1000 mg PO every 12 hours per protocol.       Shauna Estrada PharmD  6/18/2023,  9:46 AM

## 2023-06-18 NOTE — PLAN OF CARE
Up to chair, ambulating halls. Q4 neuro checks intact, patient is however very forgetful and often repeats self/questions. Frequently up to bathroom, having stools but has not yet reported seeing catheter pass at this time. EVD at 5, draining 1-15cc/hr. Pain well controlled.

## 2023-06-19 ENCOUNTER — ANESTHESIA EVENT (OUTPATIENT)
Dept: ENDOSCOPY | Facility: HOSPITAL | Age: 43
End: 2023-06-19
Payer: COMMERCIAL

## 2023-06-19 ENCOUNTER — APPOINTMENT (OUTPATIENT)
Dept: CT IMAGING | Facility: HOSPITAL | Age: 43
DRG: 032 | End: 2023-06-19
Attending: NURSE PRACTITIONER
Payer: COMMERCIAL

## 2023-06-19 ENCOUNTER — APPOINTMENT (OUTPATIENT)
Dept: CT IMAGING | Facility: HOSPITAL | Age: 43
End: 2023-06-19
Attending: NURSE PRACTITIONER
Payer: COMMERCIAL

## 2023-06-19 LAB
ANION GAP SERPL CALC-SCNC: 4 MMOL/L (ref 0–18)
BUN BLD-MCNC: 5 MG/DL (ref 7–18)
CALCIUM BLD-MCNC: 8.2 MG/DL (ref 8.5–10.1)
CHLORIDE SERPL-SCNC: 111 MMOL/L (ref 98–112)
CO2 SERPL-SCNC: 28 MMOL/L (ref 21–32)
CREAT BLD-MCNC: 0.6 MG/DL
GFR SERPLBLD BASED ON 1.73 SQ M-ARVRAT: 125 ML/MIN/1.73M2 (ref 60–?)
GLUCOSE BLD-MCNC: 100 MG/DL (ref 70–99)
OSMOLALITY SERPL CALC.SUM OF ELEC: 293 MOSM/KG (ref 275–295)
POTASSIUM SERPL-SCNC: 3.6 MMOL/L (ref 3.5–5.1)
SODIUM SERPL-SCNC: 143 MMOL/L (ref 136–145)

## 2023-06-19 PROCEDURE — 74177 CT ABD & PELVIS W/CONTRAST: CPT | Performed by: NURSE PRACTITIONER

## 2023-06-19 PROCEDURE — 99232 SBSQ HOSP IP/OBS MODERATE 35: CPT | Performed by: INTERNAL MEDICINE

## 2023-06-19 NOTE — PLAN OF CARE
Received patient at 07:30 awake and alert. Repeats questions, occ forgetful MAEW neuro q4h. EVD at 10 cm, orders to drain 15 ml and clamp every hour. Walk hallway. CT scan abd/pelvis completed. Orders to consent for colonoscopy. Started bowel pre at 1700. Problem: NEUROLOGICAL - ADULT  Goal: Achieves stable or improved neurological status  Description: INTERVENTIONS  - Assess for and report changes in neurological status  - Initiate measures to prevent increased intracranial pressure  - Maintain blood pressure and fluid volume within ordered parameters to optimize cerebral perfusion and minimize risk of hemorrhage  - Monitor temperature, glucose, and sodium.  Initiate appropriate interventions as ordered  Outcome: Progressing  Goal: Absence of seizures  Description: INTERVENTIONS  - Monitor for seizure activity  - Administer anti-seizure medications as ordered  - Monitor neurological status  Outcome: Progressing  Goal: Achieves maximal functionality and self care  Description: INTERVENTIONS  - Monitor swallowing and airway patency with patient fatigue and changes in neurological status  - Encourage and assist patient to increase activity and self care with guidance from PT/OT  - Encourage visually impaired, hearing impaired and aphasic patients to use assistive/communication devices  Outcome: Progressing     Problem: PAIN - ADULT  Goal: Verbalizes/displays adequate comfort level or patient's stated pain goal  Description: INTERVENTIONS:  - Encourage pt to monitor pain and request assistance  - Assess pain using appropriate pain scale  - Administer analgesics based on type and severity of pain and evaluate response  - Implement non-pharmacological measures as appropriate and evaluate response  - Consider cultural and social influences on pain and pain management  - Manage/alleviate anxiety  - Utilize distraction and/or relaxation techniques  - Monitor for opioid side effects  - Notify MD/LIP if interventions unsuccessful or patient reports new pain  - Anticipate increased pain with activity and pre-medicate as appropriate  Outcome: Progressing

## 2023-06-19 NOTE — PLAN OF CARE
Assumed care of pt around 1930. A/ox4 KAUR. Intermittently forgetful. EVD at [facility]- see flowsheets for outputs. VSS. Patient reports much better sleep overnight with less lucid dreams. Around 0700 patient sitting up in bed to urinate causing EVD to drain 40cc- patient reporting headache 8/10. EVD clamped. Neurosurgery updated- no new orders.

## 2023-06-20 ENCOUNTER — ANESTHESIA (OUTPATIENT)
Dept: ENDOSCOPY | Facility: HOSPITAL | Age: 43
End: 2023-06-20
Payer: COMMERCIAL

## 2023-06-20 LAB
ANION GAP SERPL CALC-SCNC: 4 MMOL/L (ref 0–18)
BASOPHILS # BLD AUTO: 0.09 X10(3) UL (ref 0–0.2)
BASOPHILS NFR BLD AUTO: 1.2 %
BUN BLD-MCNC: 4 MG/DL (ref 7–18)
CALCIUM BLD-MCNC: 8.3 MG/DL (ref 8.5–10.1)
CHLORIDE SERPL-SCNC: 112 MMOL/L (ref 98–112)
CO2 SERPL-SCNC: 28 MMOL/L (ref 21–32)
CREAT BLD-MCNC: 0.5 MG/DL
EOSINOPHIL # BLD AUTO: 0.39 X10(3) UL (ref 0–0.7)
EOSINOPHIL NFR BLD AUTO: 5 %
ERYTHROCYTE [DISTWIDTH] IN BLOOD BY AUTOMATED COUNT: 13 %
GFR SERPLBLD BASED ON 1.73 SQ M-ARVRAT: 132 ML/MIN/1.73M2 (ref 60–?)
GLUCOSE BLD-MCNC: 94 MG/DL (ref 70–99)
HCT VFR BLD AUTO: 31.4 %
HGB BLD-MCNC: 10.6 G/DL
IMM GRANULOCYTES # BLD AUTO: 0.06 X10(3) UL (ref 0–1)
IMM GRANULOCYTES NFR BLD: 0.8 %
LYMPHOCYTES # BLD AUTO: 2.37 X10(3) UL (ref 1–4)
LYMPHOCYTES NFR BLD AUTO: 30.4 %
MAGNESIUM SERPL-MCNC: 2.1 MG/DL (ref 1.6–2.6)
MCH RBC QN AUTO: 29.7 PG (ref 26–34)
MCHC RBC AUTO-ENTMCNC: 33.8 G/DL (ref 31–37)
MCV RBC AUTO: 88 FL
MONOCYTES # BLD AUTO: 0.67 X10(3) UL (ref 0.1–1)
MONOCYTES NFR BLD AUTO: 8.6 %
NEUTROPHILS # BLD AUTO: 4.22 X10 (3) UL (ref 1.5–7.7)
NEUTROPHILS # BLD AUTO: 4.22 X10(3) UL (ref 1.5–7.7)
NEUTROPHILS NFR BLD AUTO: 54 %
OSMOLALITY SERPL CALC.SUM OF ELEC: 295 MOSM/KG (ref 275–295)
PLATELET # BLD AUTO: 191 10(3)UL (ref 150–450)
POTASSIUM SERPL-SCNC: 3.7 MMOL/L (ref 3.5–5.1)
RBC # BLD AUTO: 3.57 X10(6)UL
SODIUM SERPL-SCNC: 144 MMOL/L (ref 136–145)
WBC # BLD AUTO: 7.8 X10(3) UL (ref 4–11)

## 2023-06-20 PROCEDURE — 99024 POSTOP FOLLOW-UP VISIT: CPT | Performed by: NEUROLOGICAL SURGERY

## 2023-06-20 PROCEDURE — 99232 SBSQ HOSP IP/OBS MODERATE 35: CPT | Performed by: INTERNAL MEDICINE

## 2023-06-20 RX ORDER — LIDOCAINE HYDROCHLORIDE 10 MG/ML
INJECTION, SOLUTION EPIDURAL; INFILTRATION; INTRACAUDAL; PERINEURAL AS NEEDED
Status: DISCONTINUED | OUTPATIENT
Start: 2023-06-20 | End: 2023-06-20 | Stop reason: SURG

## 2023-06-20 RX ORDER — SODIUM CHLORIDE, SODIUM LACTATE, POTASSIUM CHLORIDE, CALCIUM CHLORIDE 600; 310; 30; 20 MG/100ML; MG/100ML; MG/100ML; MG/100ML
INJECTION, SOLUTION INTRAVENOUS CONTINUOUS PRN
Status: DISCONTINUED | OUTPATIENT
Start: 2023-06-20 | End: 2023-06-20 | Stop reason: SURG

## 2023-06-20 RX ORDER — NALOXONE HYDROCHLORIDE 0.4 MG/ML
80 INJECTION, SOLUTION INTRAMUSCULAR; INTRAVENOUS; SUBCUTANEOUS AS NEEDED
Status: DISCONTINUED | OUTPATIENT
Start: 2023-06-20 | End: 2023-06-20 | Stop reason: HOSPADM

## 2023-06-20 RX ORDER — SODIUM CHLORIDE, SODIUM LACTATE, POTASSIUM CHLORIDE, CALCIUM CHLORIDE 600; 310; 30; 20 MG/100ML; MG/100ML; MG/100ML; MG/100ML
INJECTION, SOLUTION INTRAVENOUS CONTINUOUS
Status: DISCONTINUED | OUTPATIENT
Start: 2023-06-20 | End: 2023-06-22

## 2023-06-20 RX ADMIN — SODIUM CHLORIDE, SODIUM LACTATE, POTASSIUM CHLORIDE, CALCIUM CHLORIDE: 600; 310; 30; 20 INJECTION, SOLUTION INTRAVENOUS at 15:07:00

## 2023-06-20 RX ADMIN — LIDOCAINE HYDROCHLORIDE 50 MG: 10 INJECTION, SOLUTION EPIDURAL; INFILTRATION; INTRACAUDAL; PERINEURAL at 15:13:00

## 2023-06-20 NOTE — ANESTHESIA POSTPROCEDURE EVALUATION
[address] Patient Status:  Inpatient   Age/Gender 38year old male MRN [id number]   Location [address] [address] Attending Chelle Joe MD   [facility] Day # 8 PCP Beatriz Aguilar DO       Anesthesia Post-op Note    COLONOSCOPY with retreival of foreign body    Procedure Summary     Date: 06/20/23 Room / Location: [facility] ENDOSCOPY 04 / [facility] ENDOSCOPY    Anesthesia Start: 0473 Anesthesia Stop: 3283    Procedure: COLONOSCOPY with retreival of foreign body Diagnosis: (Foreign body removal )    Surgeons: Lanie Logan DO Anesthesiologist: Chyna Mata MD    Anesthesia Type: MAC ASA Status: 3          Anesthesia Type: MAC    Vitals Value Taken Time   /65 06/20/23 1547   Temp  06/20/23 1550   Pulse 66 06/20/23 1550   Resp 16 06/20/23 1550   SpO2 100 % 06/20/23 1550   Vitals shown include unvalidated device data. Patient Location: Endoscopy    Anesthesia Type: MAC    Airway Patency: patent    Postop Pain Control: adequate    Mental Status: preanesthetic baseline    Nausea/Vomiting: none    Cardiopulmonary/Hydration status: stable euvolemic    Complications: no apparent anesthesia related complications    Postop vital signs: stable    Dental Exam: Unchanged from Preop    Patient to be discharged from PACU when criteria met.

## 2023-06-20 NOTE — PLAN OF CARE
Assumed pt care at 0730. Pt resting in bed. A/o x4. Neuros intact. EVD raised to 15cm of h20. Icps wnl. CSF clear. VSS. NSR. Sbp maintained 110-150. NPO for colonoscopy this afternoon. Minimal serosanguinous output from abdominal ricardo drain. R picc intact. PT/OT. Pt and wife updated with poc. Will continue to monitor. Pt taken to Endoscopy around 1400. Report given to anesthesiologist and RN. Received pt s/p colonoscopy around 1720. Neuros intact. EVD intact. VSS. Adat.

## 2023-06-20 NOTE — PROGRESS NOTES
06/20/23 0911   Clinical Encounter Type   Visited With Patient and family together   Routine Visit Introduction   Continue Visiting No   Patient's Supportive Strategies/Resources Family   Taxonomy   Intended Effects Build relationship of care and support   Methods Offer support; Offer emotional support   Interventions Acknowledge current situation; Active listening; Ask guided questions; Ask guided questions about skye; Explain  role   Trigger for Consult   Trigger for Spiritual Care Consult No     Discussion:   initiated introductory visit (based on Pt's length of stay, <7 days). Margaux Harvey was sitting up in the chair and his wife was present at bedside. He appreciated the visit but declined further care at this time. Margaux Harvey is aware that Chaplains are always available and visits can be requested via RN. Spiritual Care support can be requested via an [employer] consult or ext. [phone].        Agueda Coker M.Div, Chaplain Resident  Ext. [phone]

## 2023-06-20 NOTE — PLAN OF CARE
Patient received Aox4. Neuro checks Q4, confused at times but able to participate in neuro assessment. Ambulating appropriately. Drinking bowel prep in anticipation of colonoscopy in AM, completed prior to midnight. Patient up ambulating throughout the night to use the bathroom and having appropriate BM/voiding. Plan for colonoscopy this AM.    Problem: NEUROLOGICAL - ADULT  Goal: Achieves stable or improved neurological status  Description: INTERVENTIONS  - Assess for and report changes in neurological status  - Initiate measures to prevent increased intracranial pressure  - Maintain blood pressure and fluid volume within ordered parameters to optimize cerebral perfusion and minimize risk of hemorrhage  - Monitor temperature, glucose, and sodium.  Initiate appropriate interventions as ordered  Outcome: Progressing  Goal: Absence of seizures  Description: INTERVENTIONS  - Monitor for seizure activity  - Administer anti-seizure medications as ordered  - Monitor neurological status  Outcome: Progressing  Goal: Achieves maximal functionality and self care  Description: INTERVENTIONS  - Monitor swallowing and airway patency with patient fatigue and changes in neurological status  - Encourage and assist patient to increase activity and self care with guidance from PT/OT  - Encourage visually impaired, hearing impaired and aphasic patients to use assistive/communication devices  Outcome: Progressing     Problem: PAIN - ADULT  Goal: Verbalizes/displays adequate comfort level or patient's stated pain goal  Description: INTERVENTIONS:  - Encourage pt to monitor pain and request assistance  - Assess pain using appropriate pain scale  - Administer analgesics based on type and severity of pain and evaluate response  - Implement non-pharmacological measures as appropriate and evaluate response  - Consider cultural and social influences on pain and pain management  - Manage/alleviate anxiety  - Utilize distraction and/or relaxation techniques  - Monitor for opioid side effects  - Notify MD/LIP if interventions unsuccessful or patient reports new pain  - Anticipate increased pain with activity and pre-medicate as appropriate  Outcome: Progressing     Problem: RISK FOR INFECTION - ADULT  Goal: Absence of fever/infection during anticipated neutropenic period  Description: INTERVENTIONS  - Monitor WBC  - Administer growth factors as ordered  - Implement neutropenic guidelines  Outcome: Progressing     Problem: SAFETY ADULT - FALL  Goal: Free from fall injury  Description: INTERVENTIONS:  - Assess pt frequently for physical needs  - Identify cognitive and physical deficits and behaviors that affect risk of falls.   - [place] fall precautions as indicated by assessment.  - Educate pt/family on patient safety including physical limitations  - Instruct pt to call for assistance with activity based on assessment  - Modify environment to reduce risk of injury  - Provide assistive devices as appropriate  - Consider OT/PT consult to assist with strengthening/mobility  - Encourage toileting schedule  Outcome: Progressing     Problem: DISCHARGE PLANNING  Goal: Discharge to home or other facility with appropriate resources  Description: INTERVENTIONS:  - Identify barriers to discharge w/pt and caregiver  - Include patient/family/discharge partner in discharge planning  - Arrange for needed discharge resources and transportation as appropriate  - Identify discharge learning needs (meds, wound care, etc)  - Arrange for interpreters to assist at discharge as needed  - Consider post-discharge preferences of patient/family/discharge partner  - Complete POLST form as appropriate  - Assess patient's ability to be responsible for managing their own health  - Refer to Case Management Department for coordinating discharge planning if the patient needs post-hospital services based on physician/LIP order or complex needs related to functional status, cognitive ability or social support system  Outcome: Progressing     Problem: Altered Communication/Language Barrier  Goal: Patient/Family is able to understand and participate in their care  Description: Interventions:  - Assess communication ability and preferred communication style  - Implement communication aides and strategies  - Use visual cues when possible  - Listen attentively, be patient, do not interrupt  - Minimize distractions  - Allow time for understanding and response  - Establish method for patient to ask for assistance (call light)  - Provide an  as needed  - Communicate barriers and strategies to overcome with those who interact with patient  Outcome: Progressing

## 2023-06-21 LAB
ANION GAP SERPL CALC-SCNC: 3 MMOL/L (ref 0–18)
BUN BLD-MCNC: 8 MG/DL (ref 7–18)
CALCIUM BLD-MCNC: 8.2 MG/DL (ref 8.5–10.1)
CHLORIDE SERPL-SCNC: 112 MMOL/L (ref 98–112)
CO2 SERPL-SCNC: 28 MMOL/L (ref 21–32)
CREAT BLD-MCNC: 0.6 MG/DL
ERYTHROCYTE [DISTWIDTH] IN BLOOD BY AUTOMATED COUNT: 13.1 %
GFR SERPLBLD BASED ON 1.73 SQ M-ARVRAT: 125 ML/MIN/1.73M2 (ref 60–?)
GLUCOSE BLD-MCNC: 88 MG/DL (ref 70–99)
HCT VFR BLD AUTO: 31.7 %
HGB BLD-MCNC: 10.4 G/DL
MCH RBC QN AUTO: 29.4 PG (ref 26–34)
MCHC RBC AUTO-ENTMCNC: 32.8 G/DL (ref 31–37)
MCV RBC AUTO: 89.5 FL
OSMOLALITY SERPL CALC.SUM OF ELEC: 294 MOSM/KG (ref 275–295)
PLATELET # BLD AUTO: 187 10(3)UL (ref 150–450)
POTASSIUM SERPL-SCNC: 4.1 MMOL/L (ref 3.5–5.1)
RBC # BLD AUTO: 3.54 X10(6)UL
SODIUM SERPL-SCNC: 143 MMOL/L (ref 136–145)
WBC # BLD AUTO: 6.9 X10(3) UL (ref 4–11)

## 2023-06-21 PROCEDURE — 99232 SBSQ HOSP IP/OBS MODERATE 35: CPT | Performed by: INTERNAL MEDICINE

## 2023-06-21 RX ORDER — ACETAMINOPHEN 500 MG
1000 TABLET ORAL EVERY 6 HOURS PRN
Status: DISCONTINUED | OUTPATIENT
Start: 2023-06-21 | End: 2023-06-23

## 2023-06-21 NOTE — PROGRESS NOTES
[facility] Pharmacy Note: Route Optimization for Pantoprazole (Protonix)    Patient is currently on pantoprazole (Protonix) 20 mg IV or PO every 24 hours. The patient meets the criteria to convert to the oral equivalent as established by the IV to Oral conversion protocol approved by the P&T committee. The IV formulation of pantoprazole (Protonix) has been discontinued per protocol.       Thank Leopold Fails, PharmD  6/21/2023  8:54 AM

## 2023-06-21 NOTE — PLAN OF CARE
Assumed care of patient at approximately 1930 sitting in the chair. Pt has EVD at 15cm H2O with minimal CSF output. ICP 2-6. Pt c/o headache, prn tylenol given with stated relief. Neuro exams q4 hours, no deficits noted except some short term memory loss. Pt ambulating in halls without difficulty. Pt updated on plan of care. See flow sheets for further details. Problem: NEUROLOGICAL - ADULT  Goal: Achieves stable or improved neurological status  Description: INTERVENTIONS  - Assess for and report changes in neurological status  - Initiate measures to prevent increased intracranial pressure  - Maintain blood pressure and fluid volume within ordered parameters to optimize cerebral perfusion and minimize risk of hemorrhage  - Monitor temperature, glucose, and sodium.  Initiate appropriate interventions as ordered  Outcome: Progressing  Goal: Absence of seizures  Description: INTERVENTIONS  - Monitor for seizure activity  - Administer anti-seizure medications as ordered  - Monitor neurological status  Outcome: Progressing  Goal: Achieves maximal functionality and self care  Description: INTERVENTIONS  - Monitor swallowing and airway patency with patient fatigue and changes in neurological status  - Encourage and assist patient to increase activity and self care with guidance from PT/OT  - Encourage visually impaired, hearing impaired and aphasic patients to use assistive/communication devices  Outcome: Progressing     Problem: PAIN - ADULT  Goal: Verbalizes/displays adequate comfort level or patient's stated pain goal  Description: INTERVENTIONS:  - Encourage pt to monitor pain and request assistance  - Assess pain using appropriate pain scale  - Administer analgesics based on type and severity of pain and evaluate response  - Implement non-pharmacological measures as appropriate and evaluate response  - Consider cultural and social influences on pain and pain management  - Manage/alleviate anxiety  - Utilize distraction and/or relaxation techniques  - Monitor for opioid side effects  - Notify MD/LIP if interventions unsuccessful or patient reports new pain  - Anticipate increased pain with activity and pre-medicate as appropriate  Outcome: Progressing     Problem: RISK FOR INFECTION - ADULT  Goal: Absence of fever/infection during anticipated neutropenic period  Description: INTERVENTIONS  - Monitor WBC  - Administer growth factors as ordered  - Implement neutropenic guidelines  Outcome: Progressing     Problem: SAFETY ADULT - FALL  Goal: Free from fall injury  Description: INTERVENTIONS:  - Assess pt frequently for physical needs  - Identify cognitive and physical deficits and behaviors that affect risk of falls.   - [place] fall precautions as indicated by assessment.  - Educate pt/family on patient safety including physical limitations  - Instruct pt to call for assistance with activity based on assessment  - Modify environment to reduce risk of injury  - Provide assistive devices as appropriate  - Consider OT/PT consult to assist with strengthening/mobility  - Encourage toileting schedule  Outcome: Progressing

## 2023-06-21 NOTE — PLAN OF CARE
Assumed care of patient this morning. Patient. A+ox4. [place]. Follows commands. Neuro intact aside from short term memory loss. Neuro checks q4h. Ambulating in [place]. Steady gait noted. bp remains within parameters. EVD raised to 20cm H2o this am per neurosurgery. Tolerating change. Plan for CT in AM. Vss. See assessment for complete details. Will continue to monitor.

## 2023-06-22 ENCOUNTER — APPOINTMENT (OUTPATIENT)
Dept: CT IMAGING | Facility: HOSPITAL | Age: 43
End: 2023-06-22
Payer: COMMERCIAL

## 2023-06-22 ENCOUNTER — APPOINTMENT (OUTPATIENT)
Dept: CT IMAGING | Facility: HOSPITAL | Age: 43
DRG: 032 | End: 2023-06-22
Payer: COMMERCIAL

## 2023-06-22 LAB
ANION GAP SERPL CALC-SCNC: 2 MMOL/L (ref 0–18)
BUN BLD-MCNC: 8 MG/DL (ref 7–18)
CALCIUM BLD-MCNC: 8.3 MG/DL (ref 8.5–10.1)
CHLORIDE SERPL-SCNC: 113 MMOL/L (ref 98–112)
CO2 SERPL-SCNC: 26 MMOL/L (ref 21–32)
CREAT BLD-MCNC: 0.61 MG/DL
ERYTHROCYTE [DISTWIDTH] IN BLOOD BY AUTOMATED COUNT: 13.3 %
GFR SERPLBLD BASED ON 1.73 SQ M-ARVRAT: 125 ML/MIN/1.73M2 (ref 60–?)
GLUCOSE BLD-MCNC: 88 MG/DL (ref 70–99)
HCT VFR BLD AUTO: 32.2 %
HGB BLD-MCNC: 10.7 G/DL
MCH RBC QN AUTO: 29.2 PG (ref 26–34)
MCHC RBC AUTO-ENTMCNC: 33.2 G/DL (ref 31–37)
MCV RBC AUTO: 87.7 FL
OSMOLALITY SERPL CALC.SUM OF ELEC: 290 MOSM/KG (ref 275–295)
PLATELET # BLD AUTO: 190 10(3)UL (ref 150–450)
POTASSIUM SERPL-SCNC: 3.7 MMOL/L (ref 3.5–5.1)
RBC # BLD AUTO: 3.67 X10(6)UL
SODIUM SERPL-SCNC: 141 MMOL/L (ref 136–145)
WBC # BLD AUTO: 5.6 X10(3) UL (ref 4–11)

## 2023-06-22 PROCEDURE — 70450 CT HEAD/BRAIN W/O DYE: CPT

## 2023-06-22 PROCEDURE — 99232 SBSQ HOSP IP/OBS MODERATE 35: CPT | Performed by: INTERNAL MEDICINE

## 2023-06-22 RX ORDER — ALPRAZOLAM 0.5 MG/1
0.5 TABLET ORAL ONCE
Status: COMPLETED | OUTPATIENT
Start: 2023-06-22 | End: 2023-06-22

## 2023-06-22 NOTE — PLAN OF CARE
Assumed care of patient at approximately 1930 sitting in the chair. Pt c/o mild headache which was relieved with prn tylenol. EVD @ 20cm H2O with minimal CSF drainage. Neuro exams q4hrs, only deficit noted was some short term memory loss. All incisions intact with no signs of infection. JERAMY to lower abdomen with minimal output. Pt updated on plan of care. See flow sheets for further details. Problem: NEUROLOGICAL - ADULT  Goal: Achieves stable or improved neurological status  Description: INTERVENTIONS  - Assess for and report changes in neurological status  - Initiate measures to prevent increased intracranial pressure  - Maintain blood pressure and fluid volume within ordered parameters to optimize cerebral perfusion and minimize risk of hemorrhage  - Monitor temperature, glucose, and sodium.  Initiate appropriate interventions as ordered  Outcome: Progressing  Goal: Absence of seizures  Description: INTERVENTIONS  - Monitor for seizure activity  - Administer anti-seizure medications as ordered  - Monitor neurological status  Outcome: Progressing  Goal: Achieves maximal functionality and self care  Description: INTERVENTIONS  - Monitor swallowing and airway patency with patient fatigue and changes in neurological status  - Encourage and assist patient to increase activity and self care with guidance from PT/OT  - Encourage visually impaired, hearing impaired and aphasic patients to use assistive/communication devices  Outcome: Progressing     Problem: PAIN - ADULT  Goal: Verbalizes/displays adequate comfort level or patient's stated pain goal  Description: INTERVENTIONS:  - Encourage pt to monitor pain and request assistance  - Assess pain using appropriate pain scale  - Administer analgesics based on type and severity of pain and evaluate response  - Implement non-pharmacological measures as appropriate and evaluate response  - Consider cultural and social influences on pain and pain management  - Manage/alleviate anxiety  - Utilize distraction and/or relaxation techniques  - Monitor for opioid side effects  - Notify MD/LIP if interventions unsuccessful or patient reports new pain  - Anticipate increased pain with activity and pre-medicate as appropriate  Outcome: Progressing     Problem: RISK FOR INFECTION - ADULT  Goal: Absence of fever/infection during anticipated neutropenic period  Description: INTERVENTIONS  - Monitor WBC  - Administer growth factors as ordered  - Implement neutropenic guidelines  Outcome: Progressing     Problem: SAFETY ADULT - FALL  Goal: Free from fall injury  Description: INTERVENTIONS:  - Assess pt frequently for physical needs  - Identify cognitive and physical deficits and behaviors that affect risk of falls.   - [place] fall precautions as indicated by assessment.  - Educate pt/family on patient safety including physical limitations  - Instruct pt to call for assistance with activity based on assessment  - Modify environment to reduce risk of injury  - Provide assistive devices as appropriate  - Consider OT/PT consult to assist with strengthening/mobility  - Encourage toileting schedule  Outcome: Progressing

## 2023-06-22 NOTE — PHYSICAL THERAPY NOTE
Physical Therapy    Pt has been walking well and repeatedly w/ rn staff. Holding PT session until EVD is removed. Will assess stairs and higher level balance at that time.

## 2023-06-22 NOTE — PLAN OF CARE
Assumed care of the pt at 0730, VSS, afebrile, walked entire unit multiple times. No c/o any pain, neuro per flowsheet. General surgery to bedside to removed JERAMY drain, Neuro Surge APN to bedside and stated the plan would be to repeat CT in AM, if looks good, would remove EVD and then observe pt for 6 hours and if stable would send him home. APN asked nurse to remove staples to head and surgical dressing that has been there for many days, charge nurse stated we do not take out staples that is not with in our scope. Sent message back to [facility] that dressing was not removed. Pt remains very anxious about details of care and when he is going home. Encouraged him to take xanax as needed and work on breathing. Problem: Patient/Family Goals  Goal: Patient/Family Long Term Goal  Description: Patient's Long Term Goal: minimize abdomen incisional pain after walks    Interventions:  - PRN medications  - rest between ambulation  - See additional Care Plan goals for specific interventions  Outcome: Progressing  Goal: Patient/Family Short Term Goal  Description: Patient's Short Term Goal: go home    Interventions:   - follow plan of care  - See additional Care Plan goals for specific interventions  Outcome: Progressing     Problem: NEUROLOGICAL - ADULT  Goal: Achieves stable or improved neurological status  Description: INTERVENTIONS  - Assess for and report changes in neurological status  - Initiate measures to prevent increased intracranial pressure  - Maintain blood pressure and fluid volume within ordered parameters to optimize cerebral perfusion and minimize risk of hemorrhage  - Monitor temperature, glucose, and sodium.  Initiate appropriate interventions as ordered  Outcome: Progressing  Goal: Absence of seizures  Description: INTERVENTIONS  - Monitor for seizure activity  - Administer anti-seizure medications as ordered  - Monitor neurological status  Outcome: Progressing  Goal: Achieves maximal functionality and self care  Description: INTERVENTIONS  - Monitor swallowing and airway patency with patient fatigue and changes in neurological status  - Encourage and assist patient to increase activity and self care with guidance from PT/OT  - Encourage visually impaired, hearing impaired and aphasic patients to use assistive/communication devices  Outcome: Progressing     Problem: PAIN - ADULT  Goal: Verbalizes/displays adequate comfort level or patient's stated pain goal  Description: INTERVENTIONS:  - Encourage pt to monitor pain and request assistance  - Assess pain using appropriate pain scale  - Administer analgesics based on type and severity of pain and evaluate response  - Implement non-pharmacological measures as appropriate and evaluate response  - Consider cultural and social influences on pain and pain management  - Manage/alleviate anxiety  - Utilize distraction and/or relaxation techniques  - Monitor for opioid side effects  - Notify MD/LIP if interventions unsuccessful or patient reports new pain  - Anticipate increased pain with activity and pre-medicate as appropriate  Outcome: Completed     Problem: RISK FOR INFECTION - ADULT  Goal: Absence of fever/infection during anticipated neutropenic period  Description: INTERVENTIONS  - Monitor WBC  - Administer growth factors as ordered  - Implement neutropenic guidelines  Outcome: Completed     Problem: SAFETY ADULT - FALL  Goal: Free from fall injury  Description: INTERVENTIONS:  - Assess pt frequently for physical needs  - Identify cognitive and physical deficits and behaviors that affect risk of falls.   - [place] fall precautions as indicated by assessment.  - Educate pt/family on patient safety including physical limitations  - Instruct pt to call for assistance with activity based on assessment  - Modify environment to reduce risk of injury  - Provide assistive devices as appropriate  - Consider OT/PT consult to assist with strengthening/mobility  - Encourage toileting schedule  Outcome: Completed     Problem: DISCHARGE PLANNING  Goal: Discharge to home or other facility with appropriate resources  Description: INTERVENTIONS:  - Identify barriers to discharge w/pt and caregiver  - Include patient/family/discharge partner in discharge planning  - Arrange for needed discharge resources and transportation as appropriate  - Identify discharge learning needs (meds, wound care, etc)  - Arrange for interpreters to assist at discharge as needed  - Consider post-discharge preferences of patient/family/discharge partner  - Complete POLST form as appropriate  - Assess patient's ability to be responsible for managing their own health  - Refer to Case Management Department for coordinating discharge planning if the patient needs post-hospital services based on physician/LIP order or complex needs related to functional status, cognitive ability or social support system  Outcome: Completed     Problem: Altered Communication/Language Barrier  Goal: Patient/Family is able to understand and participate in their care  Description: Interventions:  - Assess communication ability and preferred communication style  - Implement communication aides and strategies  - Use visual cues when possible  - Listen attentively, be patient, do not interrupt  - Minimize distractions  - Allow time for understanding and response  - Establish method for patient to ask for assistance (call light)  - Provide an  as needed  - Communicate barriers and strategies to overcome with those who interact with patient  Outcome: Completed

## 2023-06-23 ENCOUNTER — APPOINTMENT (OUTPATIENT)
Dept: CT IMAGING | Facility: HOSPITAL | Age: 43
End: 2023-06-23
Payer: COMMERCIAL

## 2023-06-23 ENCOUNTER — APPOINTMENT (OUTPATIENT)
Dept: CT IMAGING | Facility: HOSPITAL | Age: 43
DRG: 032 | End: 2023-06-23
Payer: COMMERCIAL

## 2023-06-23 VITALS
DIASTOLIC BLOOD PRESSURE: 64 MMHG | SYSTOLIC BLOOD PRESSURE: 112 MMHG | WEIGHT: 168 LBS | RESPIRATION RATE: 12 BRPM | TEMPERATURE: 98 F | HEART RATE: 86 BPM | OXYGEN SATURATION: 100 % | BODY MASS INDEX: 27 KG/M2

## 2023-06-23 LAB
ALBUMIN SERPL-MCNC: 2.7 G/DL (ref 3.4–5)
ALBUMIN/GLOB SERPL: 0.9 {RATIO} (ref 1–2)
ALP LIVER SERPL-CCNC: 31 U/L
ALT SERPL-CCNC: 20 U/L
ANION GAP SERPL CALC-SCNC: 4 MMOL/L (ref 0–18)
AST SERPL-CCNC: 22 U/L (ref 15–37)
BASOPHILS # BLD AUTO: 0.07 X10(3) UL (ref 0–0.2)
BASOPHILS NFR BLD AUTO: 1.1 %
BILIRUB SERPL-MCNC: 0.3 MG/DL (ref 0.1–2)
BUN BLD-MCNC: 9 MG/DL (ref 7–18)
CALCIUM BLD-MCNC: 8.3 MG/DL (ref 8.5–10.1)
CHLORIDE SERPL-SCNC: 112 MMOL/L (ref 98–112)
CO2 SERPL-SCNC: 27 MMOL/L (ref 21–32)
CREAT BLD-MCNC: 0.66 MG/DL
EOSINOPHIL # BLD AUTO: 0.27 X10(3) UL (ref 0–0.7)
EOSINOPHIL NFR BLD AUTO: 4.2 %
ERYTHROCYTE [DISTWIDTH] IN BLOOD BY AUTOMATED COUNT: 13.4 %
GFR SERPLBLD BASED ON 1.73 SQ M-ARVRAT: 122 ML/MIN/1.73M2 (ref 60–?)
GLOBULIN PLAS-MCNC: 3 G/DL (ref 2.8–4.4)
GLUCOSE BLD-MCNC: 96 MG/DL (ref 70–99)
HCT VFR BLD AUTO: 33.2 %
HGB BLD-MCNC: 11.2 G/DL
IMM GRANULOCYTES # BLD AUTO: 0.06 X10(3) UL (ref 0–1)
IMM GRANULOCYTES NFR BLD: 0.9 %
LYMPHOCYTES # BLD AUTO: 1.99 X10(3) UL (ref 1–4)
LYMPHOCYTES NFR BLD AUTO: 31.1 %
MAGNESIUM SERPL-MCNC: 2.3 MG/DL (ref 1.6–2.6)
MCH RBC QN AUTO: 29.6 PG (ref 26–34)
MCHC RBC AUTO-ENTMCNC: 33.7 G/DL (ref 31–37)
MCV RBC AUTO: 87.6 FL
MONOCYTES # BLD AUTO: 0.83 X10(3) UL (ref 0.1–1)
MONOCYTES NFR BLD AUTO: 13 %
NEUTROPHILS # BLD AUTO: 3.18 X10 (3) UL (ref 1.5–7.7)
NEUTROPHILS # BLD AUTO: 3.18 X10(3) UL (ref 1.5–7.7)
NEUTROPHILS NFR BLD AUTO: 49.7 %
OSMOLALITY SERPL CALC.SUM OF ELEC: 295 MOSM/KG (ref 275–295)
PHOSPHATE SERPL-MCNC: 4.3 MG/DL (ref 2.5–4.9)
PLATELET # BLD AUTO: 183 10(3)UL (ref 150–450)
POTASSIUM SERPL-SCNC: 3.8 MMOL/L (ref 3.5–5.1)
PROT SERPL-MCNC: 5.7 G/DL (ref 6.4–8.2)
RBC # BLD AUTO: 3.79 X10(6)UL
SODIUM SERPL-SCNC: 143 MMOL/L (ref 136–145)
WBC # BLD AUTO: 6.4 X10(3) UL (ref 4–11)

## 2023-06-23 PROCEDURE — 99239 HOSP IP/OBS DSCHRG MGMT >30: CPT | Performed by: INTERNAL MEDICINE

## 2023-06-23 PROCEDURE — 00P6X0Z REMOVAL OF DRAINAGE DEVICE FROM CEREBRAL VENTRICLE, EXTERNAL APPROACH: ICD-10-PCS | Performed by: NEUROLOGICAL SURGERY

## 2023-06-23 PROCEDURE — 70450 CT HEAD/BRAIN W/O DYE: CPT

## 2023-06-23 RX ORDER — LEVETIRACETAM 1000 MG/1
1000 TABLET ORAL 2 TIMES DAILY
Qty: 40 TABLET | Refills: 0 | Status: SHIPPED | OUTPATIENT
Start: 2023-06-23 | End: 2023-07-12

## 2023-06-23 RX ORDER — DIVALPROEX SODIUM 250 MG/1
750 TABLET, DELAYED RELEASE ORAL EVERY 8 HOURS SCHEDULED
Qty: 180 TABLET | Refills: 0 | Status: SHIPPED | OUTPATIENT
Start: 2023-06-23 | End: 2023-07-12

## 2023-06-23 NOTE — PROGRESS NOTES
Staples to R cranial surgical incision removed without difficulty. Sutures to L clavical and R anterior neck removed without difficulty. L clavicular incision site with mild erythema and edema prior to suture removal.   Sites were cleansed prior to removal of staples/sutures and bacitracin was applied. Patient tolerated removal well. Shira Galdamez.  Jacinto Hidden, [address]  6/23/2023 2:40 PM  Spectra [id number]

## 2023-06-23 NOTE — PROGRESS NOTES
EVD removed at bedside. Catheter tip intact on drain removal.    Site secured with mercy stitch, applied bacitracin and left open to air. Site is currently C/D/I. Patient tolerated removal well and was appreciative. Monitor patient in CNICU for 6 hours (until 15:00). If remains neurologically intact and no drainage from site, may DC home from Neurosurgical standpoint. Dina Díaz.  Marixa Mccoy, [facility]  6/23/2023 9:00 AM

## 2023-06-23 NOTE — PLAN OF CARE
Assumed care of patient at approximately 1930 sitting in the chair. Pt ambulating in halls without difficulty. Neuro exams q4hrs, only deficit noted was some short term memory loss. EVD clamped and pt tolerating well with ICP 1-5. Pt c/o dull headache which was relieved with prn tylenol. Pt updated on plan of care multiple times. See flow sheets for further details. Problem: NEUROLOGICAL - ADULT  Goal: Achieves stable or improved neurological status  Description: INTERVENTIONS  - Assess for and report changes in neurological status  - Initiate measures to prevent increased intracranial pressure  - Maintain blood pressure and fluid volume within ordered parameters to optimize cerebral perfusion and minimize risk of hemorrhage  - Monitor temperature, glucose, and sodium.  Initiate appropriate interventions as ordered  Outcome: Progressing  Goal: Absence of seizures  Description: INTERVENTIONS  - Monitor for seizure activity  - Administer anti-seizure medications as ordered  - Monitor neurological status  Outcome: Progressing  Goal: Achieves maximal functionality and self care  Description: INTERVENTIONS  - Monitor swallowing and airway patency with patient fatigue and changes in neurological status  - Encourage and assist patient to increase activity and self care with guidance from PT/OT  - Encourage visually impaired, hearing impaired and aphasic patients to use assistive/communication devices  Outcome: Progressing

## 2023-06-23 NOTE — PLAN OF CARE
Assumed care of the pt at 0730, up and about, waked entire unit at 0745am x3. EVD removed at bedside-0900, with neuro surgery ULISES Holley. Will monitor for 6 hours per order, no further scanning while inpt, will follow up per recommended directions. Site on head, c/d/i. Wife at bedside, included in plan of care. Home health and home Rx set up per [facility] in discharge planning. Wife and pt aware of medication situation and home RN coming Saturday morning for first ABX infusion. [facility] [address] [facility] so he is all set when he is ready to go home. Problem: Patient/Family Goals  Goal: Patient/Family Long Term Goal  Description: Patient's Long Term Goal: minimize abdomen incisional pain after walks    Interventions:  - PRN medications  - rest between ambulation  - See additional Care Plan goals for specific interventions  Outcome: Progressing  Goal: Patient/Family Short Term Goal  Description: Patient's Short Term Goal: go home    Interventions:   - follow plan of care  - See additional Care Plan goals for specific interventions  Outcome: Progressing     Problem: NEUROLOGICAL - ADULT  Goal: Achieves stable or improved neurological status  Description: INTERVENTIONS  - Assess for and report changes in neurological status  - Initiate measures to prevent increased intracranial pressure  - Maintain blood pressure and fluid volume within ordered parameters to optimize cerebral perfusion and minimize risk of hemorrhage  - Monitor temperature, glucose, and sodium.  Initiate appropriate interventions as ordered  Outcome: Progressing  Goal: Absence of seizures  Description: INTERVENTIONS  - Monitor for seizure activity  - Administer anti-seizure medications as ordered  - Monitor neurological status  Outcome: Progressing  Goal: Achieves maximal functionality and self care  Description: INTERVENTIONS  - Monitor swallowing and airway patency with patient fatigue and changes in neurological status  - Encourage and assist patient to increase activity and self care with guidance from PT/OT  - Encourage visually impaired, hearing impaired and aphasic patients to use assistive/communication devices  Outcome: Progressing

## 2023-06-23 NOTE — PHYSICAL THERAPY NOTE
PHYSICAL THERAPY TREATMENT NOTE - INPATIENT    Room Number: 2118/0063-I     Session: 2    Number of Visits to Meet Established Goals: 7    Presenting Problem: Infection of  shunt  Co-Morbidities : hydrocephalus,  shunt, neck granuloma     History related to current admission: Patient is a 38year old male admitted on 6/10/2023 from home as a direct admit due to shunt infection. On 6/12 pt had R shunt removal and L shunt externalization AND exp lap w/ lysis of adhesions, small bowel resection, removal of abd portion of  shunt. 6/15 had removal of external  shunt and placement of EVD.  6/20 retrieval of  shunt piece via colonoscopy. Recent hospitalizations:  4/22- 4/24 - seizure, headache      ASSESSMENT     Pt is safe w/ bed mobility, transfers and gait w/o device. Changed recommendations at d/c to home w/ outpatient PT. Pt will still benefit from outpatient PT for higher level balance and supervision as he returns to higher level athletics. Pt is an avid runner and wants to return to running as soon as it is safe to do so. Would benefit from therapy oversight as he returns to cardio activities. Pt is also recovering from a bunionectomy and was supposed to be at outpatient PT as well. DISCHARGE RECOMMENDATIONS  PT Discharge Recommendations: Home;Outpatient PT     PLAN  PT Treatment Plan: Bed mobility; Patient education; Family education; Coordination;Gait training;Strengthening;Stair training;Transfer training;Balance training  Rehab Potential : Good  Frequency (Obs): 3-5x/week    CURRENT GOALS      Goal #1 Patient is able to demonstrate supine - sit EOB @ level: modified independent      Goal #2 Patient is able to demonstrate transfers EOB to/from [facility] at assistance level: supervision  MET      Goal #3 Patient is able to ambulate 300 feet with assist device: least restrictive at assistance level: supervision      Goal #4 Pt will complete flight of stairs w/ use of railing and supervision assist.      Goal #5     Goal #6     Goal Comments: Goals established on 6/16/2023 6/23/2023 all goals achieved for MOD I      SUBJECTIVE  \"I'm ready to go home. I know I can't run yet, but I am looking to returning to running as a way to manage my anxiety. \"    OBJECTIVE  Precautions: EVD    WEIGHT BEARING RESTRICTION  Weight Bearing Restriction: None                PAIN ASSESSMENT   Rating: Unable to rate  Location: Hamstrings  Management Techniques: Activity promotion;Repositioning    BALANCE                                                                                                                       Static Sitting: Good  Dynamic Sitting: Fair           Static Standing: Fair -  Dynamic Standing: Fair -    ACTIVITY TOLERANCE                         O2 WALK         AM-PAC '6-Clicks' INPATIENT SHORT FORM - BASIC MOBILITY  How much difficulty does the patient currently have. .. Patient Difficulty: Turning over in bed (including adjusting bedclothes, sheets and blankets)?: None   Patient Difficulty: Sitting down on and standing up from a chair with arms (e.g., wheelchair, bedside commode, etc.): None   Patient Difficulty: Moving from lying on back to sitting on the side of the bed?: None   How much help from another person does the patient currently need. ..    Help from Another: Moving to and from a bed to a chair (including a wheelchair)?: None   Help from Another: Need to walk in hospital room?: None   Help from Another: Climbing 3-5 steps with a railing?: A Little       AM-PAC Score:  Raw Score: 23   Approx Degree of Impairment: 11.2%   Standardized Score (AM-PAC Scale): 56.93   CMS Modifier (G-Code): CI    FUNCTIONAL ABILITY STATUS  Gait Assessment   Functional Mobility/Gait Assessment  Gait Assistance: Modified independent  Distance (ft): 250  Assistive Device: None  Pattern: Within Functional Limits (Remains slightly guarded w/ head/neck movement and arm swing, but has improved greatly overall)  Stairs: Stairs  How Many Stairs: 26  Device: 1 Rail  Assist: Supervision  Pattern: Ascend and Descend  Ascend and Descend : Reciprocal    Skilled Therapy Provided    Bed Mobility:  Rolling: Complete independence   Supine<>Sit: Complete independence    Sit<>Supine: Independent     Transfer Mobility:  Sit<>Stand: Independent    Stand<>Sit: Independent   Gait: Mod I on level surfaces, supervision on stairs. Completed modified Fatimah Bur - see results below. Modified Sanders Balance Test    1. Sitting to standing                                                                   4  2. Standing unsupported with eyes closed                                4  3. Reaching forward with outstretched arm while standing       4  4.  object from the floor from a standing position           4  5. Turning to look over left and right shoulders while standing   4  6. Standing unsupported one foot in front                                   4  7. Standing on one leg                                                                4    Pt scored 28/28 which shows low risk for falls. Therapist's Comments: Pt encouraged to cont w/ sls and tandem standing ex to maintain high level of balance. Pt also issued simple stretching program to manage his hamstring and low back tightness. Ex program includes 30 second stretches, 1 rep, but 3x a day -  Single knee to chest,  Double knee to chest,  Supine hamstring stretch,  Sitting hamstring stretch. Patient End of Session: Up in chair;Needs met;Call light within reach;RN aware of session/findings; All patient questions and concerns addressed; Family present (Caleb Ward aware of eval session)    PT Session Time: 35 minutes  Gait Training: 15 minutes  Therapeutic Activity: 5 minutes  Therapeutic Exercise: 5 minutes   Neuromuscular Re-education: 0 minutes

## 2023-06-23 NOTE — DISCHARGE INSTRUCTIONS
[facility] for IV antibiotics  [address] @ discharge  [facility]  P: [phone]  F: [phone]   Weekly CBC, CMP, and CRP weekly      Incisional Care Instructions:  Okay to shower and allow water to run over incision  Do not scrub incision or apply soap, ointments, creams or serums  Do not submerge incision in water (no baths, pools, hot tubs, etc) until cleared by your surgeon  Surgical staples will be removed at your 2 week follow up appointment  Monitor incision sites for redness, warmth, swelling, drainage, or separation. If you notice any of these symptoms, accompanied by fever or chills, please notify our office immediately. No lifting more than 10 pounds  You can eat a general diet    Per neurology Dr. Catherine Gonzalez who was following the patient,Given Imipenem use ,with expected level-lowering interaction with vpa, VPA dose was increased to 750mg q8h and added Keppra 1g bid in hospital  and VPA level  therapeutic with this regimen. Neurologist advised that if patient is discharged home on imipenem, cont the current antiseizure meds as above, if he is discharged on another abx regimen or when off of imipenem, then ok to dc keppra and return to home dose of vpa (500mg bid).

## 2023-06-26 ENCOUNTER — TELEPHONE (OUTPATIENT)
Dept: SURGERY | Facility: CLINIC | Age: 43
End: 2023-06-26

## 2023-06-26 ENCOUNTER — PATIENT OUTREACH (OUTPATIENT)
Dept: CASE MANAGEMENT | Age: 43
End: 2023-06-26

## 2023-06-26 DIAGNOSIS — Z02.9 ENCOUNTERS FOR UNSPECIFIED ADMINISTRATIVE PURPOSE: Primary | ICD-10-CM

## 2023-06-26 NOTE — TELEPHONE ENCOUNTER
Jadyn Lee from [facility] calling to ask if pt could have tylenol 3 instead of oxycodone 5 mg. Pt is concerned about medications he's taking & doesn't want to take oxy 5mg  on top of the depakote & keppra. Call back # for [employer]  # [phone]    Sx 6.15.23 w/ Dr. Zarina Marquis:  REMOVAL OF LEFT SIDED VENTRICULO-PERITONEAL SHUNT, PLACEMENT OF LEFT EXTRA-VENTRUICULAR DRAIN     Called pt to ask if it was ok to switch appt on 7/7 w/ Leydi to April. Appt changed to 7/7 at 10:15 am w/ April. Per pt & wife, pt doesn't want to take oxy because it is too strong & would like something less strong than oxy but a little stronger than tylenol. Pt requesting call back or visetot message with update regarding medication. Routed to provider for review & input.

## 2023-06-26 NOTE — OPERATIVE REPORT
Neurosurgery operative report        Preoperative diagnosis:  1. History of hydrocephalus with multiple shunt catheter systems  2. Left occipital ventriculoperitoneal shunt, status post externalization      Postoperative diagnosis:  Same      Procedure performed:  1. Explantation of left ventriculoperitoneal shunt system  2. Placement of left occipital external ventricular drain        Surgeon: Hanny Marx MD        Assistant: Killian Buck MD        Anesthesia: General endotracheal        Estimated blood loss: Less than 50 cc        Indications for procedure:    Please also see the relevant progress notes for further information. Briefly, this patient has a fairly complex history. He has a number of shunt procedures dating back to childhood. Several days prior to this operation, my partner performed surgery to remove the right cysto-peritoneal shunt which was infected. At that operation, he externalized the left ventriculoperitoneal shunt system at the clavicle. Over the ensuing days, the patient did not have any drainage from the shunt system. CT scan remained stable, as did clinical examination. The patient was counseled regarding available surgical and nonsurgical options. Given the apparently arrested hydrocephalus, no longer requiring ventriculoperitoneal shunt, we recommended explantation of the system. Following a discussion of risks and benefits, the patient chose to proceed with this operation. Procedure in detail:    The patient was reevaluated in the preoperative holding area. The patient was reexamined. The operation was reviewed, including risks, benefits, and alternatives. Informed consent was verified. The patient was then brought back to the operating theater. A timeout was performed per protocol. General endotracheal anesthesia was induced.   Lines and monitors were started by the anesthesiologist.    The patient was placed in a supine position on a standard operating table. A roll was placed beneath the left shoulder, and the head was rotated to the right. Hair was removed with surgical clippers. The scalp was cleansed with isopropyl alcohol. Reference marks made with a skin marker. DuraPrep was applied followed by standard sterile surgical draping. Lidocaine with epinephrine was injected. Another timeout was performed per protocol. Skin incision was now made with a #10 scalpel blade. Bovie electrocautery was used to carry out deep dissection. The scalp was reflected and held in place with a suture. The distal catheter was disconnected, cut as close to the inferior aspect of the incision as possible, and removed distally at the clavicle. The valve was now removed from the ventricular catheter, which was retained with forceps. The ventricular catheter was found to be adherent. An external ventricular drainage catheter was opened. The stylette was placed down the lumen of the existing ventricular catheter, and several short bursts of Bovie electrocautery were used to free up adhesions. The catheter was removed. A small amount of soft tissue was noted at the tip. A soft pass was then carried out with the external ventricular drain. A small amount of bloody CSF was returned. Therefore, the external ventricular drain was left in place. This was tunneled under the scalp and brought out through separate stab incision. This was secured in place with sutures. The incision was irrigated copiously with saline solution. Ivin Osullivan was closed with interrupted inverted 3-0 Vicryl sutures. Skin was closed with staples. The patient was returned to a neutral, supine position after placement of a dressing. He was extubated and found to be neurologically at baseline. He was taken for immediate CT scan, which had expected findings of some pneumocephalus with a small amount of intraventricular hemorrhage.     The patient was returned to intensive care in stable condition. Disposition: Cardiac neuro ICU    Condition: Stable, extubated, and neurologically at baseline    Counts: All needle, sponge, and cottonoid counts were reported correct at the end of the operation. Complications: None    Wound classification: 1, clean    Implants: External ventricular drainage catheter    Specimen: Shunt hardware sent to pathology for identification          This report was dictated using voice recognition technology. Errors in syntax or recognition may occur, and should not be construed to change the meaning of a sentence.

## 2023-06-27 ENCOUNTER — TELEPHONE (OUTPATIENT)
Dept: SURGERY | Facility: CLINIC | Age: 43
End: 2023-06-27

## 2023-06-27 DIAGNOSIS — R41.3 MEMORY DIFFICULTY: Primary | ICD-10-CM

## 2023-07-07 ENCOUNTER — OFFICE VISIT (OUTPATIENT)
Dept: SURGERY | Facility: CLINIC | Age: 43
End: 2023-07-07
Payer: COMMERCIAL

## 2023-07-07 VITALS
BODY MASS INDEX: 23.35 KG/M2 | HEART RATE: 74 BPM | TEMPERATURE: 98 F | SYSTOLIC BLOOD PRESSURE: 112 MMHG | HEIGHT: 66.5 IN | WEIGHT: 147 LBS | DIASTOLIC BLOOD PRESSURE: 66 MMHG

## 2023-07-07 DIAGNOSIS — Z86.69 HX OF HYDROCEPHALUS: ICD-10-CM

## 2023-07-07 DIAGNOSIS — T85.730D INFECTION OF VENTRICULOPERITONEAL SHUNT, SUBSEQUENT ENCOUNTER: Primary | ICD-10-CM

## 2023-07-07 DIAGNOSIS — Z98.890 POST-OPERATIVE STATE: ICD-10-CM

## 2023-07-07 PROCEDURE — 99024 POSTOP FOLLOW-UP VISIT: CPT | Performed by: PHYSICIAN ASSISTANT

## 2023-07-07 PROCEDURE — 3078F DIAST BP <80 MM HG: CPT | Performed by: PHYSICIAN ASSISTANT

## 2023-07-07 PROCEDURE — 3008F BODY MASS INDEX DOCD: CPT | Performed by: PHYSICIAN ASSISTANT

## 2023-07-07 PROCEDURE — 3074F SYST BP LT 130 MM HG: CPT | Performed by: PHYSICIAN ASSISTANT

## 2023-07-07 NOTE — PROGRESS NOTES
Patient: Mal Billings  Medical Record Number: [id number]  YOB: 1980  PCP: Junior Ryder DO    Reason for visit: Hospital follow up, post op visit    HISTORY OF CHIEF COMPLAINT:    Mal Billings is a very pleasant 38year old male who presents today for: Hospital follow up, post op visit  6/20/23: Dr. Blayne Simms: Colonoscopy to remove retained  shunt in colon  S/p 6/15/23: Dr. Clare Torres: explantation of left  shunt system. Placement of the left occipital EVD   S/p 6/12/23: Dr. Russel Villalobos: Right occipital cysto-peritoneal shunt removal, right sub occipital shunt distal catheter removal, left occipital  shunt externalization at the clavicle - given need for bowel resection and peritoneal contamination, left occipital  shunt reservoir tap  6/12/23: Dr. Desirae Lowe: Diagnostic laparoscopy, laparoscopic lysis of adhesion converted to open, removal of abdominal portion of  shunt, small bowel resection  The patient states he is doing well. He has some continued short-term memory issues. His wife thinks that this is slowly and gradually improving. No incisional complaints. He would like to start cognitive therapy when able for his memory concerns. He has multiple questions for Dr. Russel Villalobos today. Reports no balance issues. No urinary incontinence. [place] and headaches are improving.     Past Medical History:   Diagnosis Date    Abdominal pain 01/2020    ALCOHOL USE 2003    Alcoholism on both sides    Allergic rhinitis 2001    Anxiety 10/6/2021    After performing the test asked by dr Loly Guaman i had an anxiety    Anxiety state     Bloating 01/2020    Diarrhea, unspecified 11/01/2022    Enlarged prostate     Flatulence/gas pain/belching 01/2020    Frequent urination 01/2020    Heartburn 01/2020    Hydrocephalus ([address] 1983    Indigestion 01/2020    Nodule of right lung     S/P  shunt     Seizure ([address]     Sleep disturbance 01/2020    Stress     Visual impairment     glasses    Wears glasses       Past Surgical History:   Procedure Laterality Date    COLONOSCOPY N/A 6/20/2023    Procedure: COLONOSCOPY with retreival of foreign body;  Surgeon: Randa Lefort, DO;  Location: [address] [place] [address] ENDOSCOPY    [address] [place] [address]    OTHER  11/08/2021    VENTRICULOPERITONEAL SHUNT REVISION,  REVISION DISTAL CATHETER ONLY AT RIGHT ANTERIOR CERVICAL REGION (Brian Claros), Excision of right neck cyst, scar  Stephen Stands)    [address], 11/8/2021    Hydracephalus , shunts placed. 11/8 revision    [address] [facility]    VASECTOMY  2021      Family History   Problem Relation Age of Onset    No Known Problems Father     No Known Problems Mother     Cancer Maternal Grandmother         SKIN CANCER    Alcohol and Other Disorders Associated Maternal Grandfather     Cancer Paternal Grandmother         SKIN CANCER    Hypertension Paternal Grandfather     Alcohol and Other Disorders Associated Paternal Grandfather     Stroke Paternal Grandfather       Social History    Socioeconomic History      Marital status:     Tobacco Use      Smoking status: Former      Smokeless tobacco: Never      Tobacco comments: Passive    Vaping Use      Vaping Use: Never used    Substance and Sexual Activity      Alcohol use: Yes        Comment: not since seizure      Drug use: No      Sexual activity: Yes        Partners: Female    Other Topics      Concerns:        Pt has a pacemaker: No        Pt has a defibrillator: No        Reaction to local anesthetic: No        Caffeine Concern: No          occ soda        Exercise: Yes          walking       Penicillins             HIVES   Current Medications:  Current Outpatient Medications   Medication Sig Dispense Refill    ALPRAZolam 0.5 MG Oral Tab Take 1 tablet (0.5 mg total) by mouth nightly as needed.  90 tablet 1    acetaminophen-codeine (TYLENOL WITH CODEINE #3) 300-30 MG Oral Tab Take 1 tablet by mouth every 4 (four) hours as needed for Pain. 40 tablet 0    levETIRAcetam 1000 MG Oral Tab Take 1 tablet (1,000 mg total) by mouth 2 (two) times daily for 20 days. 40 tablet 0    divalproex  MG Oral Tab EC DR tab Take 3 tablets (750 mg total) by mouth every 8 (eight) hours for 20 days. 180 tablet 0    sodium chloride 0.9% SOLN 250 mL with imipenem-cilastatin 500 MG SOLR 1,000 mg Inject 1,000 mg into the vein every 8 (eight) hours for 20 days. CBC, CMP, CRP weekly 21 Bag 2    acetaminophen 500 MG Oral Tab Take 2 tablets (1,000 mg total) by mouth every 6 (six) hours as needed for Pain. 60 tablet 0    Naloxone HCl 4 MG/0.1ML Nasal Liquid 4 mg by Nasal route as needed. If patient remains unresponsive, repeat dose in other nostril 2-5 minutes after first dose. 1 kit 0    Multiple Vitamins-Minerals (MULTIVITAMIN ADULT EXTRA C OR) Take 1 tablet by mouth daily. omega-3 fatty acids 1000 MG Oral Cap Take 1,000 mg by mouth daily. REVIEW OF SYSTEMS   Comprehensive review of systems done. Negative except what is outlined in the above HPI. PHYSICAL EXAMIMATION    vitals were not taken for this visit. GENERAL: Very pleasant patient is in no apparent distress. Sitting comfortably in the examination chair. HEENT: Normocephalic, atraumatic. RESPIRATORY RATE: Easy and Even   SKIN: Warm and dry  NEURO: Awake, alert and orientated. Speech fluent, comprehension intact, answering questions appropriately. EOMs intact. No upward gaze palsy. Pupils equal round and reactive to light and accommodation. Face appears symmetric. Tongue midline. Motility intact. Able to shrug shoulders. Negative drift. Finger-nose intact. Rapid altering movements are intact. SPINE:  Gait/Coordination: Gait deferred.      Upper Extremity Strength:     Deltoid  Biceps  Triceps    Intrinsics      Right 5 5 5 5 5     Left 5 5 5 5 5   Lower Extremity Strength:     Iliopsoas    D-flexion   Right       5         5   Left       5         5   Incision: CDI without edema, erythema or discharge. Staples intact. Drain stitch intact. Alcohol swab used to cleanse incision and drain site. Drain suture removed. Patient tolerated well. Staples removed. Patient tolerated well. Cleansed with alcohol swab. Antibiotic ointment placed. DATA:   None    IMAGING:   No recent imaging    MEDICAL DECISION MAKING:     ASSESSMENT and PLAN:  (T85.735D) Infection of ventriculoperitoneal shunt, s/p  shunt removal  (primary encounter diagnosis)    (Z98.660) Post-operative state    PLAN:   1. Medication: None prescribed  2. Imaging:    - Reviewed today:    -No recent imaging   - Ordered today:    -MRI brain with and without contrast:      -Recent  shunt infection, status post removal.    3. Activity:    -Cognitive therapy ordered for short-term memory impairment  4. Follow up in 2 months with Dr. Romulo Guo or call or follow up sooner or go to the ED for any new, worsening or concerning signs or symptoms     Dr. Romulo Guo and I discussed the plan. Dr. Romulo Guo agrees with the plan. Dr. Romulo Guo and I discussed the plan with the patient. The patient agrees with the plan, verbalized understanding and is appreciative. All questions were sought out and thoroughly answered to satisfaction. Total visit time: 45 min  More than 50% spent coordinating care, providing patient education, discussing further imaging and counseling. April Turpin M.S., ES [place]  [address] [place] [address], [address] [place] [address] [facility], [address] [place] [address]  [phone]  7/7/2023 10:15 AM    Dragon speech recognition software was used to prepare this note. If a word or phrase is confusing, it is likely due to a failure of recognition. Please contact me with any questions or clarifications.

## 2023-07-10 ENCOUNTER — OFFICE VISIT (OUTPATIENT)
Facility: LOCATION | Age: 43
End: 2023-07-10

## 2023-07-10 VITALS — TEMPERATURE: 98 F | HEART RATE: 78 BPM

## 2023-07-10 DIAGNOSIS — Z98.890 POST-OPERATIVE STATE: Primary | ICD-10-CM

## 2023-07-10 PROCEDURE — 99024 POSTOP FOLLOW-UP VISIT: CPT | Performed by: STUDENT IN AN ORGANIZED HEALTH CARE EDUCATION/TRAINING PROGRAM

## 2023-07-12 ENCOUNTER — PATIENT OUTREACH (OUTPATIENT)
Dept: INFECTIOUS DISEASE | Facility: CLINIC | Age: 43
End: 2023-07-12

## 2023-07-12 DIAGNOSIS — R56.9 SEIZURES (HCC): Primary | ICD-10-CM

## 2023-07-12 DIAGNOSIS — R56.9 SEIZURES (HCC): ICD-10-CM

## 2023-07-12 RX ORDER — LEVETIRACETAM 1000 MG/1
1000 TABLET ORAL 2 TIMES DAILY
Qty: 180 TABLET | Refills: 0 | Status: SHIPPED | OUTPATIENT
Start: 2023-07-12 | End: 2023-07-12

## 2023-07-12 RX ORDER — DIVALPROEX SODIUM 250 MG/1
750 TABLET, DELAYED RELEASE ORAL EVERY 8 HOURS SCHEDULED
Qty: 270 TABLET | Refills: 3 | Status: SHIPPED | OUTPATIENT
Start: 2023-07-12

## 2023-07-12 RX ORDER — DIVALPROEX SODIUM 250 MG/1
750 TABLET, DELAYED RELEASE ORAL EVERY 8 HOURS SCHEDULED
Qty: 270 TABLET | Refills: 2 | Status: SHIPPED | OUTPATIENT
Start: 2023-07-12 | End: 2023-07-12

## 2023-07-12 RX ORDER — LEVETIRACETAM 1000 MG/1
1000 TABLET ORAL 2 TIMES DAILY
Qty: 180 TABLET | Refills: 1 | Status: SHIPPED | OUTPATIENT
Start: 2023-07-12

## 2023-07-12 NOTE — TELEPHONE ENCOUNTER
Per 6/16/23 IP note Dr. Marli Willard increased Depakote to 750 TID and Keppra 1000 mg BID    H/o seizures- 2/2 congenital hydrocephalus and VPS  Given Imipenem use with expected level-lowering interaction with vpa, VPA dose was increased to 750mg q8 and added Keppra 1g bid. Medication: Levetiracetam and Divalproex     Date of last refill: 06/23/2023 (#40/0) Levetiracetam (20 day supply) IP by Dr. Nicole Mckay                               06/23/2023 (180/0) Divalproex (20 day supply) IP by Dr. Nicole Mckay       Last office visit: 05/10/2023  Due back to clinic per last office note: Scheduled for 8/15/2023  Date next office visit scheduled:    Future Appointments   Date Time Provider [place]   7/14/2023  8:00 AM Isaac Estrada LCSW [facility] [place]   7/19/2023  9:30 AM Omar Conteh MD [id number]   7/28/2023  1:00 PM SANJU GloverW [facility]   8/15/2023  8:20 AM DO NOELLE Cortez [id number]   8/18/2023  8:00 AM SANJU GloverW [facility]   9/1/2023  8:00 AM SANJU GloverW [facility]   9/1/2023 10:15 AM PF MRI 1 (1.5T) PF MRI [place]   2/9/2024  8:30 AM Alice Galicia MD  [facility] [place]           Last OV note recommendation:    Plan:  1. Seizures  - CTH and OSH records reviewed  - EEG reports reviewed  - OSH and neurosurgery charting reviewed  - Decrease Keppra to 750mg BID  - We also discussed switching to Depakote if he continues to have aggravation or headaches.    - MRI already scheduled

## 2023-07-12 NOTE — PROGRESS NOTES
Completing 6 weeks, 1 more week, then DC picc. Advised to contact neurologist to discuss if changes in depakote dosage and levels, with discontinuation of imipenem.

## 2023-07-12 NOTE — TELEPHONE ENCOUNTER
Patient is asking for an extra week due to IV antibiotic were extended. Medication: levetiracetam 1000 MG Oral Tab + divalproex  MG Oral Tab EC DR tab     Date of last refill: 07/12/2023 (#180/0) + 07/12/2023 (#270/2)  [place]     Last office visit: 05/10/2023  Due back to clinic per last office note:  Around 08/10/2023  Date next office visit scheduled:    Future Appointments   Date Time Provider [place]   7/14/2023  8:00 AM Claudean Matar, LCSW [facility] [place]   7/19/2023  9:30 AM Elton Keller MD [id number] S[facility]On[facility]   7/28/2023  1:00 PM Claudean Matar, LCSW [facility] [place]   8/15/2023  8:20 AM DO NOELLE Katz [id number]   8/18/2023  8:00 AM Claudean Matar, LCSW [facility] [place]   9/1/2023  8:00 AM Claudean Matar, LCSW [facility] [place]   9/1/2023 10:15 AM PF MRI 1 (1.5T) PF MRI [place]   2/9/2024  8:30 AM Sherren Ronco, MD  [facility] [place]           Last OV note recommendation:    Assessment: This is a 37 y/o male with a h/io a congential hydrocephalus s/p shunting and revision and seizures. His exam is noted above. I will reduce Keppra to 750-mg BID to help reduce his side effects that he describes (see HPI) and if ok in the ext few weeks we can perhaps reduce further to 500mg BID. He also describes frequent headaches and another possibility would be to switch to Depakote 500mg BID        Plan:  1. Seizures  - CTH and OSH records reviewed  - EEG reports reviewed  - OSH and neurosurgery charting reviewed  - Decrease Keppra to 750mg BID  - We also discussed switching to Depakote if he continues to have aggravation or headaches.    - MRI already scheduled

## 2023-07-12 NOTE — TELEPHONE ENCOUNTER
Pt's spouse stated Dr Taylor Meza ordered scripts while IP and needs refills. Refills requested are for divalproex and levetiracetam. Psr notes scripts were ordered by Dr Lidia Cabrera. Please call pt's spouse to discuss.

## 2023-07-13 ENCOUNTER — TELEPHONE (OUTPATIENT)
Dept: NEUROLOGY | Facility: CLINIC | Age: 43
End: 2023-07-13

## 2023-07-13 NOTE — TELEPHONE ENCOUNTER
Received request for Divalproex  from pharmacy stating drug not covered by insurance. Alternative is Valproic acid cap.  Patient has been on medication in patient and since May    Cover my meds  [id number] - [place] Case ID: [id number]

## 2023-07-16 ENCOUNTER — PATIENT MESSAGE (OUTPATIENT)
Dept: SURGERY | Facility: CLINIC | Age: 43
End: 2023-07-16

## 2023-07-17 NOTE — TELEPHONE ENCOUNTER
Noted the patient message listed below. Y Paperwork from pt FMLA received by RN  clinical staff, endorsed to provider for review. Placed Dr. Pedro Parisi   Will be sent to scanning once received back from provider. Y paperwork reviewed and signed by provider. Copy sent to scan with cover sheet Y  Copy kept in the office until verified in media Y  Original mailed to home of record:  awaiting pt feedback       Noted the paitnet LOV on 7/7/2023:  \"ASSESSMENT and PLAN:  (T85.730D) Infection of ventriculoperitoneal shunt, s/p  shunt removal  (primary encounter diagnosis)     (Z98.890) Post-operative state     PLAN:   1. Medication: None prescribed  2. Imaging:                 - Reviewed today:                              -No recent imaging                - Ordered today:                              -MRI brain with and without contrast:                                             -Recent  shunt infection, status post removal.    3. Activity:                 -Cognitive therapy ordered for short-term memory impairment  4. Follow up in 2 months with Dr. Annmarie Chew or call or follow up sooner or go to the ED for any new, worsening or concerning signs or symptoms      Dr. Annmarie Chew and I discussed the plan. Dr. Annmarie Chew agrees with the plan. Dr. Annmarie Chew and I discussed the plan with the patient. The patient agrees with the plan, verbalized understanding and is appreciative. All questions were sought out and thoroughly answered to satisfaction.  \"

## 2023-07-19 ENCOUNTER — OFFICE VISIT (OUTPATIENT)
Dept: SURGERY | Facility: CLINIC | Age: 43
End: 2023-07-19
Payer: COMMERCIAL

## 2023-07-19 DIAGNOSIS — L90.5 SCAR OF NECK: Primary | ICD-10-CM

## 2023-07-19 NOTE — PROGRESS NOTES
Yumiko Mota is a 41year old male who presents today for a follow-up after excision of right neck open wound granuloma, skin and subcutaneous tissue, complex layered wound closure right neck on 6/10/2023. This was performed at the same time as his surgery for his infected  shunt. He denies fever and chills. He denies nausea, vomiting, diarrhea or constipation. His pain is controlled. Physical Exam     Right neck incision clean, dry and intact. No wound drainage or erythema. There were no vitals filed for this visit. Assessment and Plan     Yumiko Mota is doing well s/p excision of right neck open wound granuloma, skin and subcutaneous tissue, complex layered wound closure right neck on 6/10/2023. He is healing well. We reviewed options for scar management including vitamin E oil, silicone products, scar massage and sun protection/sun block. He will follow up with me as needed. Questions were answered. Patient understands.

## 2023-07-20 NOTE — TELEPHONE ENCOUNTER
Received fax from [facility].   Approval from 07/20/2023-07/19/2024  Medication Divalproex  mg tablet TID  Case number [id number]  Faxed approval to dispensing pharmacy  Received fax confirmation

## 2023-07-24 ENCOUNTER — PATIENT MESSAGE (OUTPATIENT)
Dept: FAMILY MEDICINE CLINIC | Facility: CLINIC | Age: 43
End: 2023-07-24

## 2023-07-24 NOTE — TELEPHONE ENCOUNTER
Do you want pt. to make an appt. to discuss? In person or telemed? Please advise. .[phone] [facility]

## 2023-07-24 NOTE — TELEPHONE ENCOUNTER
From: Unknown Fells  To: Jerridayana Giordano   Sent: 7/24/2023 10:49 AM CDT  Subject: Psychiatrist Referral    Hi Dr. Do Roberts,    I hope you are well. I am now officially several weeks post op, and while most things have been trending well, last weekend was a bit of a set back. I experienced dizzy spells (mostly due to being in the car) and anxiety increased the symptoms. Those seem to have since passed, however, my in home nurse this I should ask for a referral for a psychiatrist.     While I currently see Ilya Johnson (and can continue to do so for short term/long term therapy), the dizzy spells followed by anxiety attacks this past weekend this far out from post op, (and one week left before I return to work), my nurse thinks a consultation with a psychiatrist who can prescribe me something for day to day anxiety (as Xanax would put me to sleep) as I start to transition back to regular life will do me some good. Please advise and thank you in advance.      Katey Keith

## 2023-07-24 NOTE — TELEPHONE ENCOUNTER
From: Delmi Ramos  To: Jian Diaz DO  Sent: 7/24/2023 11:28 AM CDT  Subject: Driving Restrictions letter     Hi Dr. Lauren Thayer for multiple messages today, however, I was going back and forth with my neurosurgeon about driving restrictions. [facility] (who has been 100% remote since I started) is implementing a hybrid model by September 1st. This would require me to go into an [place] office 1-2 days a week. Each office is at least 30-40 miles away. Due to the surgeries I have recently undergone, I am not allowed to drive for 6 months. [facility] is an [employer], so technically (unless I am missing something), the DMV says I have a valid license. My back to work date is a week from today, 7/31. Could I please ask you for a letter stating that I should not be driving anywhere for the allotted six months time due to the surgeries and brain trauma? Also In addition, this reminds me that I need to schedule my yearly check up as well. So I will do that after I send this!      Rashel Webber

## 2023-07-27 ENCOUNTER — OFFICE VISIT (OUTPATIENT)
Dept: SURGERY | Facility: CLINIC | Age: 43
End: 2023-07-27
Payer: COMMERCIAL

## 2023-07-27 VITALS
DIASTOLIC BLOOD PRESSURE: 82 MMHG | HEIGHT: 66.5 IN | SYSTOLIC BLOOD PRESSURE: 122 MMHG | BODY MASS INDEX: 23.5 KG/M2 | WEIGHT: 148 LBS | HEART RATE: 89 BPM

## 2023-07-27 DIAGNOSIS — T85.730D INFECTION OF VENTRICULOPERITONEAL SHUNT, SUBSEQUENT ENCOUNTER: Primary | ICD-10-CM

## 2023-07-27 PROBLEM — D72.825 BANDEMIA: Status: ACTIVE | Noted: 2023-04-23

## 2023-07-27 PROCEDURE — 3074F SYST BP LT 130 MM HG: CPT | Performed by: NEUROLOGICAL SURGERY

## 2023-07-27 PROCEDURE — 99024 POSTOP FOLLOW-UP VISIT: CPT | Performed by: NEUROLOGICAL SURGERY

## 2023-07-27 PROCEDURE — 3008F BODY MASS INDEX DOCD: CPT | Performed by: NEUROLOGICAL SURGERY

## 2023-07-27 PROCEDURE — 3079F DIAST BP 80-89 MM HG: CPT | Performed by: NEUROLOGICAL SURGERY

## 2023-08-09 ENCOUNTER — TELEPHONE (OUTPATIENT)
Dept: NEUROLOGY | Facility: CLINIC | Age: 43
End: 2023-08-09

## 2023-08-09 NOTE — TELEPHONE ENCOUNTER
Psr reached out to pt as pt had medical records waiting to be picked up since 6/2023. Pt [facility] given to nurse Shoaib Rodríguez and [facility]

## 2023-08-10 ENCOUNTER — TELEPHONE (OUTPATIENT)
Dept: PHYSICAL THERAPY | Facility: HOSPITAL | Age: 43
End: 2023-08-10

## 2023-08-10 NOTE — TELEPHONE ENCOUNTER
Reviewed records and CD does not contain any imaging, only PDF records. Provider has reviewed. Records to be given to patient at next OV.

## 2023-08-13 ENCOUNTER — PATIENT MESSAGE (OUTPATIENT)
Facility: LOCATION | Age: 43
End: 2023-08-13

## 2023-08-14 ENCOUNTER — OFFICE VISIT (OUTPATIENT)
Dept: SPEECH THERAPY | Facility: HOSPITAL | Age: 43
End: 2023-08-14
Attending: NEUROLOGICAL SURGERY
Payer: COMMERCIAL

## 2023-08-14 DIAGNOSIS — R41.3 MEMORY DIFFICULTY: Primary | ICD-10-CM

## 2023-08-14 PROCEDURE — 96125 COGNITIVE TEST BY HC PRO: CPT

## 2023-08-14 NOTE — PROGRESS NOTES
ADULT SPEECH/LANGUAGE EVALUATION:     Diagnosis:   Memory difficulty [R41.3]       Referring Provider: Cathy Gould  Date of Evaluation:    8/14/2023    Precautions:  None Next MD visit:   none scheduled  Date of Surgery: n/a     PATIENT SUMMARY   Hermelindo Lazo is a 41year old male who presents to therapy today with complaints of memory difficulty specifically in the area of short term/working memory. Incident/Injury:  shunt removal  Pain: No pain reported on this date. Hospital/Rehab course: June 2023 (see above); IP speech therapy (swallow evaluation); Home Health Speech Therapy (June 2023 to August 2023) within the area of cognition  Current functional limitations include deficit in working and short term memory impacting ability to complete job functions. 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 Pt goals include improving short term memory for work-related purposes. Past medical history was reviewed with Carmen Driscoll. Significant findings include   Past Medical History:   Diagnosis Date    Abdominal pain 01/2020    ALCOHOL USE 2003    Alcoholism on both sides    Allergic rhinitis 2001    Anxiety 10/6/2021    After performing the test asked by dr Dorothea Ch i had an anxiety    Anxiety state     Bloating 01/2020    Diarrhea, unspecified 11/01/2022    Enlarged prostate     Flatulence/gas pain/belching 01/2020    Frequent urination 01/2020    Heartburn 01/2020    Hydrocephalus ([address] 1983    Indigestion 01/2020    Nodule of right lung     S/P  shunt     Seizure ([address]     Sleep disturbance 01/2020    Stress     Visual impairment     glasses    Wears glasses        ASSESSMENT  Carmen Driscoll presents to speech therapy evaluation with primary c/o deficits in memory. The results of the objective tests and measures show cognitive-communication deficits within the following areas: memory, attention, executive functioning.   Functional deficits include but are not limited to difficulty utilizing memory skills effectively and efficiently to support daily life and work-related tasks. Signs and symptoms are consistent with diagnosis of cognitive-communication deficit. Pt and SLP discussed evaluation findings, pathology, POC and HEP. Pt voiced understanding and performs HEP correctly. Skilled Speech Therapy is medically necessary to address the above impairments and reach functional goals. OBJECTIVE:   Assessment tools used: Cognitive-Linguistic Quick Test (CLQT) and Other: Cognitive Function Short Form 8a    COGNITIVE-COMMUNICATIVE SKILLS  Severity: Mild-moderate; while patient's standardized test scores reveal minimal deficits, patient demonstrates deficits within the following areas: Attention, Memory, Problem solving, and Executive function  Comments: Patient reports continued deficits following jessi surgery which impact his daily life. Impact on communication: Patient requires cognitive-communication skills to communicate in daily life and within job (e.g., running teams, work meetings). The patient was administered the Cognitive Linguistic Quick Test (CLQT) to assess cognitive functions in the following domains: attention, memory, executive functions, language, visuospatial skills, and clock drawing. WNL for [age] years old:  Administered the Cognitive Linguistic Quick Test (CLQT). CLQT scores are as follows: Attention: 203 (IYD=342-858); Memory: 172 (FZI=122-371); Executive Functions 31 (WNL=24-40); Language: 34 (WNL=29-30); Visuospatial Skills: 75 (WNL=); Clock Drawin (WNL=12-13). Composite Cognitive Score 3.8 (WNL 3.5-4.0).     Subtests:  Personal Facts= 8 (average=8)  Symbol Cancellations= 12 (average=11)  Confrontation Naming= 10 (average=10)  Clock Drawing= 12 (average=12)  Story Retelling= 8 (average=6)  Symbol Trails= 10 (average=9)  Generative Naming= 8 (average=5)  Design Memory= 6 (average=5)  Mazes= 8 (average=7)  Design Generation= 5 (average=6)    Cognitive Function Short Form 8a: This patient reported outcome measure is utilized to determine patients' perceptions of cognitive function and impact on daily life activities. Score: 26/40 (patient scored sometimes/2-3 times for 6/8 questions)  A lower score indicates more severe perception of cognitive function. Today's Treatment:  Pt education was provided on exam findings, treatment diagnosis, treatment plan, expectations, and prognosis. Pt was also provided recommendations for use of compensatory memory strategies. Patient was instructed in and issued a HEP for: use of compensatory memory strategies    Charges: Soraya aguilar, [id number]      Total Timed Treatment: 45 min    PLAN OF CARE:    Goals: (to be met in 12 visits)   STG 1: Patient will verbalize 4 compensatory memory strategies (processing, working memory, short-term memory, attention, problem-solving) and describe 1 use per strategy given min verbal and visual cues. STG 2: Patient will accurately complete working memory tasks within 80% of opportunities given min verbal and visual cueing. STG 3: Patient will utilize accurate planning and note-taking skills within 85% of opportunities given min verbal and visual cueing. STG 4: Patient will accurately complete moderate level complexity verbal reasoning/problem solving/deductive tasks with 80% accuracy given mod verbal and visual cueing. STG 5: Patient will divide attention between two moderate level complexity tasks given less than 3 verbal redirections/reminders. Frequency / Duration: Patient will be seen for 2x/week or a total of 12 visits over a 90 day period. Treatment will include: Speech Therapy    Education or treatment limitation: None  Rehab Potential:good      Patient/Family/Caregiver was advised of these findings, precautions, and treatment options and has agreed to actively participate in planning and for this course of care.     Thank you for your referral. Please co-sign or sign and return this letter via fax as soon as possible to [phone]. If you have any questions, please contact me at Dept: [phone]    Sincerely,  Electronically signed by therapist: YONG Pena  [de-identified] certification required: Yes  I certify the need for these services furnished under this plan of treatment and while under my care.     X___________________________________________________ Date____________________    Certification From: 9/53/0342  To:11/12/2023

## 2023-08-15 ENCOUNTER — OFFICE VISIT (OUTPATIENT)
Dept: NEUROLOGY | Facility: CLINIC | Age: 43
End: 2023-08-15
Payer: COMMERCIAL

## 2023-08-15 ENCOUNTER — PATIENT MESSAGE (OUTPATIENT)
Dept: NEUROLOGY | Facility: CLINIC | Age: 43
End: 2023-08-15

## 2023-08-15 VITALS
WEIGHT: 159.94 LBS | BODY MASS INDEX: 25 KG/M2 | RESPIRATION RATE: 15 BRPM | SYSTOLIC BLOOD PRESSURE: 109 MMHG | DIASTOLIC BLOOD PRESSURE: 61 MMHG | HEART RATE: 74 BPM

## 2023-08-15 DIAGNOSIS — R56.9 SEIZURES (HCC): ICD-10-CM

## 2023-08-15 DIAGNOSIS — R56.9 SEIZURE (HCC): Primary | ICD-10-CM

## 2023-08-15 PROCEDURE — 3074F SYST BP LT 130 MM HG: CPT | Performed by: OTHER

## 2023-08-15 PROCEDURE — 99214 OFFICE O/P EST MOD 30 MIN: CPT | Performed by: OTHER

## 2023-08-15 PROCEDURE — 3078F DIAST BP <80 MM HG: CPT | Performed by: OTHER

## 2023-08-15 RX ORDER — DIVALPROEX SODIUM 250 MG/1
500 TABLET, EXTENDED RELEASE ORAL DAILY
Qty: 60 TABLET | Refills: 1 | Status: SHIPPED | OUTPATIENT
Start: 2023-08-15

## 2023-08-15 NOTE — PROGRESS NOTES
Neurology H&P    Esther Gonsalez Patient Status:  No patient class for patient encounter    1980 MRN [id number]   Location [facility], [address], [facility] Attending No att. providers found   Hosp Day # 0 PCP Jordyn James DO     Subjective:  Initial Clinic HPI 5/10/23  Esther Gonsalez is a(n) 41year old male with a PMH significant for anxiety and hydrocephalus s/p VPS. He comes to the neurology clinic for evaluation and management of sezures. 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 He had a shunt revision in childhood and then again by Dr. Kwabena Bernard in . He comes with his wife today. He states that he was at a birthday party. He felt dizzy almost like an out of body experience when he entered the party. He states that sounds sounded muffled like a duglas brown cartoon. He was having difficulty functioning. He was unable to text on his phone. He was unable to form a complete thought or sentence. His wife states that he was not making any sense at all and his pupils were pinpoint. He had not had any EtOH or illicit's. His wife states that prior to getting to this party he was completely fine. His wife got him up and he was able to walk to the car. He has no recollection of this event. He was sheila to walk into the ED. He was unable to answeres any of the triage questions. He had a CTH and when brought back had a seizure. Per wife who witnessed this he made a sound like he was in pain or a groaning noise, his eyes then rolled back into his head. He then became very rigid and he started shaking all over. Per wife they gave him ativan and then this abated. He was admitted. He had 2 EEGs one of which showed frequent R temporal sharp waves per report and an MRI. He has followed up nuerosurgery as well. Interim History:  Pt was last seen in the clinic on 5/10/23.  Since that time he was admitted to the hospital and had a shunt removal by neurosurgery. He is currently taking Depakote 750mg TID for seizure prophylaxis as he had requested a switch form Keppra 750mg BID after his last visit. He has stopped taking Keppra. He comes with his wife today. He is taking Depakote 750mg TID at this time. He states that due to forgetfulness he does not always. He denies any LOC or AMS or seizure like activity. He would like to try and stop his seizure medications. Current Medications:  Current Outpatient Medications   Medication Sig Dispense Refill    divalproex  MG Oral Tab EC DR tab Take 3 tablets (750 mg total) by mouth every 8 (eight) hours. 270 tablet 3    ALPRAZolam 0.5 MG Oral Tab Take 1 tablet (0.5 mg total) by mouth nightly as needed. 90 tablet 1    acetaminophen-codeine (TYLENOL WITH CODEINE #3) 300-30 MG Oral Tab Take 1 tablet by mouth every 4 (four) hours as needed for Pain. 40 tablet 0    acetaminophen 500 MG Oral Tab Take 2 tablets (1,000 mg total) by mouth every 6 (six) hours as needed for Pain. 60 tablet 0    Naloxone HCl 4 MG/0.1ML Nasal Liquid 4 mg by Nasal route as needed. If patient remains unresponsive, repeat dose in other nostril 2-5 minutes after first dose. 1 kit 0    Multiple Vitamins-Minerals (MULTIVITAMIN ADULT EXTRA C OR) Take 1 tablet by mouth daily. omega-3 fatty acids 1000 MG Oral Cap Take 1,000 mg by mouth daily.          Problem List:  Patient Active Problem List:     Hx of hydrocephalus     Family history of skin cancer     Lateral epicondylitis of left elbow     Left ear impacted cerumen     Anxiety     Shunt malfunction     Scar of neck     Acute eczematoid otitis externa of right ear     Seizure ([address]     Infection of  (ventriculoperitoneal) shunt ([address]     Infection     Bandemia      PMHx:  Past Medical History:   Diagnosis Date    Abdominal pain 01/2020    ALCOHOL USE 2003    Alcoholism on both sides    Allergic rhinitis 2001    Anxiety 10/6/2021    After performing the test asked by dr Cher Carvalho i had an anxiety Anxiety state     Bloating 01/2020    Diarrhea, unspecified 11/01/2022    Enlarged prostate     Flatulence/gas pain/belching 01/2020    Frequent urination 01/2020    Heartburn 01/2020    Hydrocephalus ([address] 1983    Indigestion 01/2020    Nodule of right lung     S/P  shunt     Seizure ([facility]     Sleep disturbance 01/2020    Stress     Visual impairment     glasses    Wears glasses        PSHx:  Past Surgical History:   Procedure Laterality Date    COLONOSCOPY N/A 06/20/2023    Procedure: COLONOSCOPY with retreival of foreign body;  Surgeon: Kendall Samuel DO;  Location: [address] [place] [address] ENDOSCOPY    [address]    OTHER  11/08/2021    VENTRICULOPERITONEAL SHUNT REVISION,  REVISION DISTAL CATHETER ONLY AT RIGHT ANTERIOR CERVICAL REGION ([facility]), Excision of right neck cyst, scar  Derrill Climes)    [address] [place] [address], 11/8/2021    Hydracephalus , shunts placed. 11/8 revision    OTHER SURGICAL HISTORY N/A 06/12/2023    RIGHT OCCIPITAL SHUNT REMOVAL, LEFT  SHUNT EXTERNALIZATION AND  SHUNT TAP    RECONSTR NOSE  1996    VASECTOMY  2021       SocHx:  Social History     Socioeconomic History    Marital status:    Tobacco Use    Smoking status: Former    Smokeless tobacco: Never    Tobacco comments:     Passive   Vaping Use    Vaping Use: Never used   Substance and Sexual Activity    Alcohol use:  Yes     Alcohol/week: 4.0 standard drinks of alcohol     Types: 4 Cans of beer per week    Drug use: No    Sexual activity: Yes     Partners: Female   Other Topics Concern    Pt has a pacemaker No    Pt has a defibrillator No    Reaction to local anesthetic No    Caffeine Concern Yes     Comment: coffee daily    Exercise Yes    Seat Belt Yes    Special Diet No    Stress Concern No    Weight Concern No        Family History:  Family History   Problem Relation Age of Onset    No Known Problems Father     No Known Problems Mother     Cancer Maternal Grandmother SKIN CANCER    Alcohol and Other Disorders Associated Maternal Grandfather     Cancer Paternal Grandmother         SKIN CANCER    Hypertension Paternal Grandfather     Alcohol and Other Disorders Associated Paternal Grandfather     Stroke Paternal Grandfather           ROS:  10 point ROS completed and was negative, except for pertinent positive and negatives stated in subjective. Objective/Physical Exam:    Vital Signs:  Blood pressure 109/61, pulse 74, resp. rate 15, weight 159 lb 15.1 oz (72.6 kg). Gen: Awake and in no apparent distress  HEENT: moist mucus membranes  Neck: Supple  Cardiovascular: Regular rate and rhythm, no murmur  Pulm: CTAB  GI: non-tender, normal bowel sounds  Skin: normal, dry  Extremities: No clubbing or cyanosis      Neurologic:   MENTAL STATUS: alert, ox3, normal attention, language and fund of knowledge. CRANIAL NERVES II to XII: PERRLA, no ptosis or diplopia, EOM intact, facial sensation intact, strong eye closure, face is symmetric, no dysarthria, tongue midline,  no tongue fasciculations or atrophy, strong shoulder shrug. MOTOR EXAMINATION: normal tone, no fasciculations, normal strength throughout in UEs and LEs      SENSORY EXAMINATION:  UE: intact to light touch, pinprick intact  LE: intact to light touch, pinprick intact    COORDINATION:  No dysmetria, or intention tremors     REFLEXES: 2+ at biceps, 3+ brachioradialis, 3+ at patella, 2+ at the ankles, + BUE Hoffmans    GAIT: normal stance,  tandem well. Romberg's: negative      Labs:       Imaging:  [facility] 12/3/21  FINDINGS:         Right posterior temporal ventriculostomy catheter that extends into CSF cystic space adjacent to the atria the right lateral ventricle is stable. Right suboccipital catheter extending to the apex of the tentorium and adjacent to the straight sinus is   stable. Left parietal ventriculostomy catheter has tip adjacent to the septum pellucidum and in the right frontal horn is stable. Additional left parietal ventriculostomy catheter extending along the subdural space/subarachnoid space is stable. Sequelae of left frontal parietal craniotomy and right temporal craniotomy is noted. Dilated CSF space adjacent to the right lateral ventricle is stable. Mild asymmetric prominence of the right temporal horn with CSF space extends over the tentorium and adjacent to the right cerebral peduncle is stable. Arachnoid cyst adjacent to the right cerebellar hemisphere is relatively stable. Associated mass effect is unchanged. Cerebellar tonsillar ectopia is stable. Mild to moderate mucoperiosteal thickening of the paranasal sinuses is stable. Impression   CONCLUSION:         No significant changes since prior exam.          EEG 4/24/23  Interpretation: This is an abnormal EEG due to frequent high amplitude slow waves in the   right temporal area, the majority of which do not appear epileptiform. Clinical Correlation:     The EEG is improved compared to yesterday but still shows significant   neuronal dysfunction and risk for seizures from the right temporal area. EEG 4/23/23  Interpretation: This is an abnormal EEG due to slowing and frequent epileptiform   discharges in the right temporal area. Clinical Correlation: The finding indicates a significant risk for further right temporal lobe   seizures. Assessment: This is a 40 y/o male with a h/o a congential hydrocephalus s/p shunting and revision and seizures. He had another shunt revision in June 2023. His ASMs were adjusted during that hospitalization due to VPA interaction with imipenem given for infection. I will continue Depakote 500mg ER Qday for now. I will repeat the EEG and if no abnormalities we can consider tapering to 250mg ER Qday and then off if able. His initial seizure could have been induced by sepsis.  Imaging studies and charts form recently hospitalization reviewed today. Plan:  1. Seizures  - CTH and OSH records reviewed  - EEG reports reviewed  - OSH and neurosurgery charting reviewed  - Decrease Depakote to 500mg BID  - Repeat EEG. If no significant abnormalities we can try and taper off Depakote     A total of 45 minutes was spent with patient >50% of visit was spent in counseling and coordination of care, including discussion of diagnostic workup to date, discussion of medication side effects and plan for additional testing and monitoring as noted above; patient and family allowed to ask questions and all questions answered to the best of my ability.       RTC in 3-4 months    Eusebia Cruz, DO  Neurology

## 2023-08-15 NOTE — PROGRESS NOTES
Patient denies any seizures since last visit. Pt would like to discuss how much longer he needs to be medicated for seizures.

## 2023-08-16 ENCOUNTER — OFFICE VISIT (OUTPATIENT)
Dept: SPEECH THERAPY | Facility: HOSPITAL | Age: 43
End: 2023-08-16
Attending: NEUROLOGICAL SURGERY
Payer: COMMERCIAL

## 2023-08-16 PROCEDURE — 92507 TX SP LANG VOICE COMM INDIV: CPT

## 2023-08-16 NOTE — PROGRESS NOTES
Diagnosis:   Memory difficulty [R41.3]        Referring Provider: Reji Ha  Date of Evaluation:   8/14/2023    Precautions:  None Next MD visit:   none scheduled  Date of Surgery: n/a   Insurance Primary/Secondary: [address] [place] [address] / N/A       # Auth Visits: 90 combined  Total Timed Treatment: 45 min  Date POC Expires: 11/12/2023  Charges: [id number]       Treatment Number: 2    Subjective: Patient arrived on time to session accompanied by wife, Navi Bal and daughter who waited in the waiting room during session. Patient participated actively in therapeutic tasks. Patient reported desire to carryover supports to the home environment. Pain: No pain reported on this date. Objective:  Goals: (to be met in 12 visits)   STG 1: Patient will verbalize 4 compensatory memory strategies (processing, working memory, short-term memory, attention, problem-solving) and describe 1 use per strategy given min verbal and visual cues. Progress: Initial training occurred regarding compensatory memory strategies    STG 2: Patient will accurately complete working memory tasks within 80% of opportunities given min verbal and visual cueing. Progress: 70% accuracy increased to 75% given mod verbal and visual cues. STG 3: Patient will utilize accurate planning and note-taking skills within 85% of opportunities given min verbal and visual cueing. Progress: Discussed note-taking on this date. Established HEP. STG 4: Patient will accurately complete moderate level complexity verbal reasoning/problem solving/deductive tasks with 80% accuracy given mod verbal and visual cueing. Progress: Discussed functional problem solving scenarios with patient able to generate solutions effectively within 75% of opportunities given mod verbal and visual cues. STG 5: Patient will divide attention between two moderate level complexity tasks given less than 3 verbal redirections/reminders.    Progress: Goal not targeted on this date 2/2 focus on other goal areas. HEP: Use of external cognitive supports and generating memory station within environment. Education: Educated on use of external cognitive supports and compensatory memory strategies. Assessment: Patient presents with cognitive-communication deficits following brain surgery. Patient's deficits impact his ability to effectively communicate desired information, ability to effectively recall desired information, and complete daily life, occupational, vocational, and social tasks. On this date, patient demonstrated initial understanding of compensatory memory strategies. Patient benefited from discussion regarding use of external cognitive supports to compensate for current deficits and facilitate smooth transition back to work. Completed spaced retrieval training with patient benefiting from wait time and verbal cues. Discussed importance of carryover and home practice. Plan: Continue speech-language therapy targeting cognitive-communication.

## 2023-08-21 ENCOUNTER — OFFICE VISIT (OUTPATIENT)
Dept: SPEECH THERAPY | Facility: HOSPITAL | Age: 43
End: 2023-08-21
Attending: NEUROLOGICAL SURGERY
Payer: COMMERCIAL

## 2023-08-21 PROCEDURE — 92507 TX SP LANG VOICE COMM INDIV: CPT

## 2023-08-21 NOTE — PROGRESS NOTES
Diagnosis:   Memory difficulty [R41.3]        Referring Provider: Gisela Campbell  Date of Evaluation:   8/14/2023    Precautions:  None Next MD visit:   none scheduled  Date of Surgery: n/a   Insurance Primary/Secondary: [address] [place] [address] / N/A       # Auth Visits: 90 combined  Total Timed Treatment: 45 min  Date POC Expires: 11/12/2023  Charges: [id number]       Treatment Number: 3    Subjective: Patient arrived on time to session accompanied by wife, Teresita Zamora who waited in the waiting room during session. Patient participated actively in therapeutic tasks. Patient reports that he comes to therapy from work and will retutn to work following session. Pain: No pain reported on this date. Objective:  Goals: (to be met in 12 visits)   STG 1: Patient will verbalize 4 compensatory memory strategies (processing, working memory, short-term memory, attention, problem-solving) and describe 1 use per strategy given min verbal and visual cues. Progress: Patient demonstrated recall of 2 compensatory memory strategies during session tasks. STG 2: Patient will accurately complete working memory tasks within 80% of opportunities given min verbal and visual cueing. Progress: 80% accuracy given mod verbal and visual cues. STG 3: Patient will utilize accurate planning and note-taking skills within 85% of opportunities given min verbal and visual cueing. Progress: 80% given mod verbal and visual cues with demonstrated carryover. STG 4: Patient will accurately complete moderate level complexity verbal reasoning/problem solving/deductive tasks with 80% accuracy given mod verbal and visual cueing. Progress: 75% accuracy given mod verbal and visual cues. STG 5: Patient will divide attention between two moderate level complexity tasks given less than 3 verbal redirections/reminders.    Progress: 3 visual/verbal reminders      HEP: Send email to SLP with specified information  Education: Educated on use of external cognitive supports, note-taking, and compensatory memory strategies. Assessment: Patient presents with cognitive-communication deficits following brain surgery. Patient's deficits impact his ability to effectively communicate desired information, ability to effectively recall desired information, and complete daily life, occupational, vocational, and social tasks. On this date, patient reviewed compensatory memory strategies for purposes of session tasks. Patient completed email writing and note-taking exercises. Able to double check work given cueing. Benefited from verbal cueing and feedback to support knowledge of performance. Plan: Continue speech-language therapy targeting cognitive-communication.

## 2023-08-23 ENCOUNTER — OFFICE VISIT (OUTPATIENT)
Dept: SPEECH THERAPY | Facility: HOSPITAL | Age: 43
End: 2023-08-23
Attending: NEUROLOGICAL SURGERY
Payer: COMMERCIAL

## 2023-08-23 PROCEDURE — 92507 TX SP LANG VOICE COMM INDIV: CPT

## 2023-08-23 NOTE — PROGRESS NOTES
Diagnosis:   Memory difficulty [R41.3]        Referring Provider: Toño Davis  Date of Evaluation:   8/14/2023    Precautions:  None Next MD visit:   none scheduled  Date of Surgery: n/a   Insurance Primary/Secondary: [facility] / N/A       # Auth Visits: 90 combined  Total Timed Treatment: 45 min  Date POC Expires: 11/12/2023  Charges: [id number]       Treatment Number: 4    Subjective: Patient arrived on time to session accompanied by wife, Florin Kaplan who waited in the waiting room during session. Patient participated actively in therapeutic tasks. Pain: No pain reported on this date. Objective:  Goals: (to be met in 12 visits)   STG 1: Patient will verbalize 4 compensatory memory strategies (processing, working memory, short-term memory, attention, problem-solving) and describe 1 use per strategy given min verbal and visual cues. Progress: Patient demonstrated recall of 3 compensatory memory strategies during session tasks. STG 2: Patient will accurately complete working memory tasks within 80% of opportunities given min verbal and visual cueing. Progress: 80% accuracy given min verbal and visual cues. Goal met; to gauge carryover in next session. STG 3: Patient will utilize accurate planning and note-taking skills within 85% of opportunities given min verbal and visual cueing. Progress: 85% given min verbal and visual cues with demonstrated carryover. Goal met; to gauge carryover in next session. STG 4: Patient will accurately complete moderate level complexity verbal reasoning/problem solving/deductive tasks with 80% accuracy given mod verbal and visual cueing. Progress: 80% accuracy given mod verbal and visual cues. Goal met; to gauge carryover in next session. STG 5: Patient will divide attention between two moderate level complexity tasks given less than 3 verbal redirections/reminders. Progress: 3 visual/verbal reminders. Goal met; to gauge carryover in next session.        HEP: Compose and send work related Teena Baird. Education: Educated on use of external cognitive supports, note-taking, and compensatory memory strategies. Assessment: Patient presents with cognitive-communication deficits following brain surgery. Patient's deficits impact his ability to effectively communicate desired information, ability to effectively recall desired information, and complete daily life, occupational, vocational, and social tasks. On this date, patient reviewed compensatory memory strategies to support cognition. Discussed patient's current use of memory station and reminders in home. Patient completed working memory exercises with functional stimuli and medication management check. Patient demonstrates safety with management of medications and use of external supports. Patient continues to benefit from reminders and support to facilitate follow through. Plan: Continue speech-language therapy targeting cognitive-communication.

## 2023-08-24 ENCOUNTER — NURSE ONLY (OUTPATIENT)
Dept: ELECTROPHYSIOLOGY | Facility: HOSPITAL | Age: 43
End: 2023-08-24
Attending: Other
Payer: COMMERCIAL

## 2023-08-24 DIAGNOSIS — R56.9 SEIZURES (HCC): ICD-10-CM

## 2023-08-24 DIAGNOSIS — F41.8 ANXIETY ABOUT HEALTH: ICD-10-CM

## 2023-08-24 PROCEDURE — 95819 EEG AWAKE AND ASLEEP: CPT | Performed by: OTHER

## 2023-08-24 NOTE — TELEPHONE ENCOUNTER
Requesting Alprazolam 0.5mg  LOV: 7/3/23 VV  RTC: prn  Last Relevant Labs:   Filled: 7/3/23 #90 with 1 refills    Future Appointments   Date Time Provider Yeimy Chani   8/25/2023  8:00 AM Jerrol Kettle, LCSW [facility] [place]   8/28/2023  1:15 PM Prudencio Repress, SLP [facility] [address]   8/30/2023 10:00 AM Prudencio Repress, SLP [address] [facility] [address]   9/1/2023  8:00 AM Jerrol Kettle, LCSW [facility] [place]   9/1/2023 10:15 AM PF MRI RM1 (1.5T) PF MRI [place]   9/6/2023  1:15 PM Prudencio Repress, SLP [address] [facility] [address]   9/7/2023  9:00 AM Prudencio Repress, SLP [address] [facility]  [address]   9/11/2023 11:30 AM Prudencio Repress, SLP [facility]   9/13/2023  8:00 AM Jerrol Kettle, LCSW [facility] [place]   9/14/2023  9:45 AM Prudencio Repress, SLP [address] [facility]   9/18/2023 11:30 AM Prudencio Repress, SLP [address] [facility]  [address]   9/21/2023 10:30 AM Prudencio Repress, SLP [address] [facility]  [address]   9/27/2023  8:00 AM Jerrol Kettle, LCSW [facility] [place]   10/13/2023  8:00 AM Jerrol Kettle, LCSW [facility] [place]   12/1/2023 10:40 AM DO NOELLE Jane [id number]   1/12/2024  7:30 AM PF CT RM1 PF CT [place]   2/9/2024  8:30 AM Brennen Blum MD  [facility] [place]     Rx pended and routed for approval/denial

## 2023-08-25 RX ORDER — ALPRAZOLAM 0.5 MG/1
0.5 TABLET ORAL NIGHTLY PRN
Qty: 90 TABLET | Refills: 1 | Status: SHIPPED | OUTPATIENT
Start: 2023-08-25 | End: 2024-02-21

## 2023-08-28 ENCOUNTER — OFFICE VISIT (OUTPATIENT)
Dept: SPEECH THERAPY | Facility: HOSPITAL | Age: 43
End: 2023-08-28
Attending: NEUROLOGICAL SURGERY
Payer: COMMERCIAL

## 2023-08-28 ENCOUNTER — PATIENT MESSAGE (OUTPATIENT)
Dept: NEUROLOGY | Facility: CLINIC | Age: 43
End: 2023-08-28

## 2023-08-28 PROCEDURE — 92507 TX SP LANG VOICE COMM INDIV: CPT

## 2023-08-28 NOTE — PROGRESS NOTES
Diagnosis:   Memory difficulty [R41.3]        Referring Provider: Olga Orantes  Date of Evaluation:   8/14/2023    Precautions:  None Next MD visit:   none scheduled  Date of Surgery: n/a   Insurance Primary/Secondary: [address] [place] [address] / N/A       # Auth Visits: 90 combined  Total Timed Treatment: 45 min  Date POC Expires: 11/12/2023  Charges: [id number]       Treatment Number: 5    Subjective: Patient arrived on time to session accompanied by wife, Shoaib Moscoso who waited in the waiting room during session. Patient participated actively in therapeutic tasks. Pain: No pain reported on this date. Objective:  Goals: (to be met in 12 visits)   STG 1: Patient will verbalize 4 compensatory memory strategies (processing, working memory, short-term memory, attention, problem-solving) and describe 1 use per strategy given min verbal and visual cues. Progress: Patient demonstrated recall of 4 compensatory memory strategies during session tasks. STG 2: Patient will accurately complete working memory tasks within 80% of opportunities given min verbal and visual cueing. Progress: 80% accuracy given min verbal and visual cues. Goal met; see updated goal.   STG 2B: Patient will accurately complete working memory tasks within 85% of opportunities given min verbal and visual cueing. STG 3: Patient will utilize accurate planning and note-taking skills within 85% of opportunities given min verbal and visual cueing. Progress: 85% given min verbal and visual cues with demonstrated carryover. Goal met; see updated goal.   STG 3B: Patient will utilize accurate planning and note-taking skills within 90% of opportunities given min verbal and visual cueing. STG 4: Patient will accurately complete moderate level complexity verbal reasoning/problem solving/deductive tasks with 80% accuracy given mod verbal and visual cueing. Progress: 80% accuracy given mod verbal and visual cues.  Goal met; see updated goal.   STG 4B: Patient will accurately complete moderate level complexity verbal reasoning/problem solving/deductive tasks with 80% accuracy given min verbal and visual cueing. STG 5: Patient will divide attention between two moderate level complexity tasks given less than 3 verbal redirections/reminders. Progress: 3 visual/verbal reminders. Goal met; see updated goal.   STG 5: Patient will divide attention between two moderate level complexity tasks given less than 1 verbal redirections/reminders. HEP: Doctor's appointment supports  Education: Educated on use of external cognitive supports, note-taking, and compensatory memory strategies. Assessment: Patient presents with cognitive-communication deficits following brain surgery. Patient's deficits impact his ability to effectively communicate desired information, ability to effectively recall desired information, and complete daily life, occupational, vocational, and social tasks. On this date, patient reviewed current use of compensatory memory strategies including use of note-taking strategy for functional and vocational tasks. Patient indicated that he has followed up with staff regarding already resolved issues. SLP and patient discussed use of external supports to repair this breakdown. Patient demonstrated difficulty with task completion given external auditory distractions (i.e. coffee shop audio). SLP utilized this auditory distraction with regard to patient's cognitive load and decreased stimuli given patient's difficulty to reduce risk of fatigue. Patient demonstrates progress toward all goal areas. Patient continues to benefit from cueing and opportunities to practice. Plan: Continue speech-language therapy targeting cognitive-communication.

## 2023-08-28 NOTE — TELEPHONE ENCOUNTER
From: John Rae  To: Yisel Howe DO  Sent: 8/28/2023 10:49 AM CDT  Subject: Question regarding Electroncephalogram (EEG)    Hi Dr. Jeanette Myaer,    I presume normal is good. However will there be any changes to my medication or am I on this for life? I suppose an EEG is only temporary for that specific day and moment in time. I am also still sensitive to light/strobe lights. I guess what does this mean moving forward for weening me off medication if at all or any follow up tests?      Thanks,    Miranda

## 2023-08-28 NOTE — TELEPHONE ENCOUNTER
Pt calling to schedule a phone call visit to discuss EEG,plan of care. No appts avail until Nov 1st. Pt would like a call to discuss , states to much back and forth with Kenny Matthew. Please call pt to discuss.

## 2023-08-28 NOTE — PROCEDURES
[facility] - ELECTROENCEPHALOGRAM (EEG) REPORT  Patient Name:  Esther Gonsalez   MRN / CSN:  [id number] / [id number]   Date of Birth / Age:  7/13/1980 /  41year old   Encounter Date:  8/24/23         METHODS:  Twenty-two electrodes were applied according to the 10-20-electrode placement system on this routine audio-video EEG. EKG monitoring, monopolar and bipolar montages are routinely utilized. The record was obtained on a digital system. OBJECT:  This is a 41year old year-old male with a PMH of hydrocephalus s/p VPS placed in childhood and recent removal of this shunt who presents for seizure evalaution    The EEG was requested to assess for epileptiform activity and change in mental status. State(s) of consciousness: awake and asleep    Relevant medications:       FINDINGS:  1) Background: A posterior-dominant background rhythm of 10-12 Hz with an amplitude of 20-40 microvolts is seen. 2) Sleep: Normal, symmetrical sleep complexes were noted. 3) Abnormalities:  None  4) Activation:                    HV:  performed. IPS:  performed. IMPRESSION:  This EEG is a normal study recorded in the awake and asleep states. No focal, lateralizing, or epileptiform activity is seen and no seizures are recorded.      Report covers  Start 8/24/23 at   End 8/24/23 at 1252    SIGNATURES:  [facility]

## 2023-08-28 NOTE — TELEPHONE ENCOUNTER
Scheduled pt for appt on 11/01 in [place] & added to wait list. Canceled 12/1 appt per pt. Patient also had a telephone conversation with Dr. Marisol Crenshaw.

## 2023-08-29 ENCOUNTER — PATIENT MESSAGE (OUTPATIENT)
Facility: CLINIC | Age: 43
End: 2023-08-29

## 2023-08-30 ENCOUNTER — OFFICE VISIT (OUTPATIENT)
Dept: SPEECH THERAPY | Facility: HOSPITAL | Age: 43
End: 2023-08-30
Attending: NEUROLOGICAL SURGERY
Payer: COMMERCIAL

## 2023-08-30 PROCEDURE — 92507 TX SP LANG VOICE COMM INDIV: CPT

## 2023-09-01 ENCOUNTER — HOSPITAL ENCOUNTER (OUTPATIENT)
Dept: MRI IMAGING | Age: 43
Discharge: HOME | End: 2023-09-01
Attending: PHYSICIAN ASSISTANT
Payer: COMMERCIAL

## 2023-09-01 DIAGNOSIS — T85.730D INFECTION OF VENTRICULOPERITONEAL SHUNT, SUBSEQUENT ENCOUNTER: ICD-10-CM

## 2023-09-01 DIAGNOSIS — Z98.890 POST-OPERATIVE STATE: ICD-10-CM

## 2023-09-01 DIAGNOSIS — Z86.69 HX OF HYDROCEPHALUS: ICD-10-CM

## 2023-09-01 PROCEDURE — A9575 INJ GADOTERATE MEGLUMI 0.1ML: HCPCS | Performed by: PHYSICIAN ASSISTANT

## 2023-09-01 PROCEDURE — 70553 MRI BRAIN STEM W/O & W/DYE: CPT | Performed by: PHYSICIAN ASSISTANT

## 2023-09-01 RX ORDER — GADOTERATE MEGLUMINE 376.9 MG/ML
15 INJECTION INTRAVENOUS
Status: COMPLETED | OUTPATIENT
Start: 2023-09-01 | End: 2023-09-01

## 2023-09-01 RX ADMIN — GADOTERATE MEGLUMINE 15 ML: 376.9 INJECTION INTRAVENOUS at 10:39:00

## 2023-09-06 ENCOUNTER — OFFICE VISIT (OUTPATIENT)
Dept: SPEECH THERAPY | Facility: HOSPITAL | Age: 43
End: 2023-09-06
Attending: FAMILY MEDICINE
Payer: COMMERCIAL

## 2023-09-06 PROCEDURE — 92507 TX SP LANG VOICE COMM INDIV: CPT

## 2023-09-06 NOTE — PROGRESS NOTES
Diagnosis:   Memory difficulty [R41.3]        Referring Provider: Olga Orantes Date of Evaluation:   8/14/2023    Precautions:  None Next MD visit:   none scheduled  Date of Surgery: n/a   Insurance Primary/Secondary: [address] [place] [address] / N/A       # Auth Visits: 90 combined  Total Timed Treatment: 45 min  Date POC Expires: 11/12/2023  Charges: [id number]       Treatment Number: 7    Subjective: Patient arrived on time to session. Patient participated actively in therapeutic tasks. Pain: No pain reported on this date. Objective:  Goals: (to be met in 12 visits)   STG 1: Patient will verbalize 4 compensatory memory strategies (processing, working memory, short-term memory, attention, problem-solving) and describe 1 use per strategy given min verbal and visual cues. Progress: Patient demonstrated recall of 4 compensatory memory strategies during session tasks. STG 2B: Patient will accurately complete working memory tasks within 85% of opportunities given min verbal and visual cueing. Progress: 85% accuracy given mod verbal and visual cues. STG 3B: Patient will utilize accurate planning and note-taking skills within 90% of opportunities given min verbal and visual cueing. Progress: 90% given mod verbal and visual cues with demonstrated carryover. STG 4B: Patient will accurately complete moderate level complexity verbal reasoning/problem solving/deductive tasks with 80% accuracy given min verbal and visual cueing. Progress: 80% accuracy given min verbal and visual cues. Goal met; to gauge carryover in next session. STG 5B: Patient will divide attention between two moderate level complexity tasks given less than 1 verbal redirections/reminders. Progress: 3 visual/verbal reminders. HEP: Doctor's appointment supports  Education: Educated on use of external cognitive supports, note-taking, and compensatory memory strategies.      Assessment: Patient presents with cognitive-communication deficits following brain surgery. Patient's deficits impact his ability to effectively communicate desired information, ability to effectively recall desired information, and complete daily life, occupational, vocational, and social tasks. On this date, patient completed problem solving and planning surrounding upcoming doctor's appointments. Patient benefited from mod verbal and visual cues to engage memory and attention for complex tasks. Patient continues to progress toward all goal areas. Plan: Continue speech-language therapy targeting cognitive-communication.

## 2023-09-07 ENCOUNTER — OFFICE VISIT (OUTPATIENT)
Dept: SPEECH THERAPY | Facility: HOSPITAL | Age: 43
End: 2023-09-07
Attending: FAMILY MEDICINE
Payer: COMMERCIAL

## 2023-09-07 PROCEDURE — 92507 TX SP LANG VOICE COMM INDIV: CPT

## 2023-09-07 NOTE — PROGRESS NOTES
Diagnosis:   Memory difficulty [R41.3]        Referring Provider: Ruthy Garcia Date of Evaluation:   8/14/2023    Precautions:  None Next MD visit:   none scheduled  Date of Surgery: n/a   Insurance Primary/Secondary: [facility] / N/A       # Auth Visits: 90 combined  Total Timed Treatment: 45 min  Date POC Expires: 11/12/2023  Charges: [id number]       Treatment Number: 8    Subjective: Patient arrived on time to session. Patient participated actively in therapeutic tasks. Pain: No pain reported on this date. Objective:  Goals: (to be met in 12 visits)   STG 1: Patient will verbalize 4 compensatory memory strategies (processing, working memory, short-term memory, attention, problem-solving) and describe 1 use per strategy given min verbal and visual cues. Progress: Patient demonstrated recall of 4 compensatory memory strategies during session tasks. STG 2B: Patient will accurately complete working memory tasks within 85% of opportunities given min verbal and visual cueing. Progress: 85% accuracy given mod verbal and visual cues. STG 3B: Patient will utilize accurate planning and note-taking skills within 90% of opportunities given min verbal and visual cueing. Progress: 90% given mod verbal and visual cues with demonstrated carryover. STG 4B: Patient will accurately complete moderate level complexity verbal reasoning/problem solving/deductive tasks with 80% accuracy given min verbal and visual cueing. Progress: 80% accuracy given min verbal and visual cues. Goal met; to gauge carryover in next session. STG 5B: Patient will divide attention between two moderate level complexity tasks given less than 1 verbal redirections/reminders. Progress: 3 visual/verbal reminders. HEP: Doctor's appointment supports  Education: Educated on use of external cognitive supports, note-taking, and compensatory memory strategies.      Assessment: Patient presents with cognitive-communication deficits following brain surgery. Patient's deficits impact his ability to effectively communicate desired information, ability to effectively recall desired information, and complete daily life, occupational, vocational, and social tasks. On this date, patient exhibited dizziness and required decreased task complexity. Completed problem solving/reasoning tasks surrounding work related issues. Patient demonstrated ability to utilize problem solving skills given cueing and support. Patient generated list of questions for upcoming meeting and utilized external supports with increased independence on this date. Plan: Continue speech-language therapy targeting cognitive-communication.

## 2023-09-11 ENCOUNTER — OFFICE VISIT (OUTPATIENT)
Dept: SPEECH THERAPY | Facility: HOSPITAL | Age: 43
End: 2023-09-11
Attending: FAMILY MEDICINE
Payer: COMMERCIAL

## 2023-09-11 PROCEDURE — 92507 TX SP LANG VOICE COMM INDIV: CPT

## 2023-09-11 NOTE — PROGRESS NOTES
Diagnosis:   Memory difficulty [R41.3]        Referring Provider: Lady Beatty Date of Evaluation:   8/14/2023    Precautions:  None Next MD visit:   none scheduled  Date of Surgery: n/a   Insurance Primary/Secondary: [address] [place] [address] / N/A       # Auth Visits: 90 combined  Total Timed Treatment: 45 min  Date POC Expires: 11/12/2023  Charges: [id number]       Treatment Number: 9    Subjective: Patient arrived on time to session. Patient participated actively in therapeutic tasks. Pain: No pain reported on this date. Objective:  Goals: (to be met in 12 visits)   STG 1: Patient will verbalize 4 compensatory memory strategies (processing, working memory, short-term memory, attention, problem-solving) and describe 1 use per strategy given min verbal and visual cues. Progress: Patient demonstrated recall of 4 compensatory memory strategies during session tasks. Goal met. STG 2B: Patient will accurately complete working memory tasks within 85% of opportunities given min verbal and visual cueing. Progress: 85% accuracy given min verbal and visual cues. Goal met; to gauge carryover in next session. STG 3B: Patient will utilize accurate planning and note-taking skills within 90% of opportunities given min verbal and visual cueing. Progress: 90% given mod verbal and visual cues with demonstrated carryover. STG 4B: Patient will accurately complete moderate level complexity verbal reasoning/problem solving/deductive tasks with 80% accuracy given min verbal and visual cueing. Progress: 80% accuracy given min verbal and visual cues. Goal met; see updated goal.   STG 4C: Patient will accurately complete moderate level complexity verbal reasoning/problem solving/deductive tasks with 85% accuracy given min verbal and visual cueing. Progress: 80% accuracy given min verbal and visual cues.      STG 5B: Patient will divide attention between two moderate level complexity tasks given less than 1 verbal redirections/reminders. Progress: 2 visual/verbal reminders. HEP: Data analysis   Education: Educated on use of external cognitive supports, note-taking, and compensatory memory strategies. Assessment: Patient presents with cognitive-communication deficits following brain surgery. Patient's deficits impact his ability to effectively communicate desired information, ability to effectively recall desired information, and complete daily life, occupational, vocational, and social tasks. 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 Patient benefited from verbal and visual cueing to support attention to task and problem solving for higher level situations. Patient has demonstrated progress toward goals as evidenced by ability to attend to tasks, however he benefits from cueing and support when external distractions are present. Plan: Continue speech-language therapy targeting cognitive-communication.

## 2023-09-14 ENCOUNTER — OFFICE VISIT (OUTPATIENT)
Dept: SPEECH THERAPY | Facility: HOSPITAL | Age: 43
End: 2023-09-14
Attending: FAMILY MEDICINE
Payer: COMMERCIAL

## 2023-09-14 PROCEDURE — 92507 TX SP LANG VOICE COMM INDIV: CPT

## 2023-09-18 ENCOUNTER — OFFICE VISIT (OUTPATIENT)
Dept: SURGERY | Facility: CLINIC | Age: 43
End: 2023-09-18
Payer: COMMERCIAL

## 2023-09-18 ENCOUNTER — OFFICE VISIT (OUTPATIENT)
Dept: SPEECH THERAPY | Facility: HOSPITAL | Age: 43
End: 2023-09-18
Attending: FAMILY MEDICINE
Payer: COMMERCIAL

## 2023-09-18 VITALS
BODY MASS INDEX: 23.82 KG/M2 | SYSTOLIC BLOOD PRESSURE: 140 MMHG | HEIGHT: 66.5 IN | HEART RATE: 65 BPM | WEIGHT: 150 LBS | DIASTOLIC BLOOD PRESSURE: 80 MMHG

## 2023-09-18 DIAGNOSIS — G04.90 BRAIN INFECTION: Primary | ICD-10-CM

## 2023-09-18 PROCEDURE — 3079F DIAST BP 80-89 MM HG: CPT | Performed by: NEUROLOGICAL SURGERY

## 2023-09-18 PROCEDURE — 92507 TX SP LANG VOICE COMM INDIV: CPT

## 2023-09-18 PROCEDURE — 3008F BODY MASS INDEX DOCD: CPT | Performed by: NEUROLOGICAL SURGERY

## 2023-09-18 PROCEDURE — 3077F SYST BP >= 140 MM HG: CPT | Performed by: NEUROLOGICAL SURGERY

## 2023-09-18 PROCEDURE — 99024 POSTOP FOLLOW-UP VISIT: CPT | Performed by: NEUROLOGICAL SURGERY

## 2023-09-21 ENCOUNTER — OFFICE VISIT (OUTPATIENT)
Dept: SPEECH THERAPY | Facility: HOSPITAL | Age: 43
End: 2023-09-21
Attending: FAMILY MEDICINE
Payer: COMMERCIAL

## 2023-09-21 PROCEDURE — 92507 TX SP LANG VOICE COMM INDIV: CPT

## 2023-09-21 NOTE — PROGRESS NOTES
Diagnosis:   Memory difficulty [R41.3]        Referring Provider: Gisela Campbell Date of Evaluation:   8/14/2023    Precautions:  None Next MD visit:   none scheduled  Date of Surgery: n/a   Insurance Primary/Secondary: [facility] / N/A       # Auth Visits: 90 combined  Total Timed Treatment: 45 min  Date POC Expires: 11/12/2023  Total Treatment Time: 45 min  Charges: [id number]   Treatment Number: 12     Progress Summary  Pt has attended 12 visits in Speech Therapy. Dear Dr. Gisela Campbell,  This letter is to inform you of Francisca Yossi progress in speech-language therapy. Since his initial evaluation, Emanuel Harris has attended 12 sessions. Therapy sessions have targeted cognitive-communication within the areas of memory (recall and working), attention to task, use of compensatory memory strategies, and executive functions. A home exercise program (HEP) addressing use of external cognitive supports and compensatory memory strategies has been provided and completed consistently. During this progress period, Emanuel Harris has demonstrated significant improvement in attention and memory given opportunities to practice within structured sessions tasks and functional daily living environments/contexts. While Emanuel Harris has progressed, he continues to demonstrate difficulty with working memory tasks and recall of pertinent and meaningful information re: work and home. Therefore, it is recommended that speech-language therapy continue at a frequency of 1x/week for 10 weeks to address Galileo's cognitive-communication deficits. Subjective: Patient arrived on time to session. Patient participated actively in therapeutic tasks. Pain: No pain reported on this date. Objective:  Goals: (to be met in 10 additional visits)   STG 1: Patient will verbalize 4 compensatory memory strategies (processing, working memory, short-term memory, attention, problem-solving) and describe 1 use per strategy given min verbal and visual cues.   Progress: Patient demonstrated recall of 4 compensatory memory strategies during session tasks. Goal met. STG 2C: Patient will accurately complete working memory tasks within 90% of opportunities given min verbal and visual cueing. Progress: 85% accuracy given min verbal and visual cues. STG 3B: Patient will utilize accurate planning and note-taking skills within 90% of opportunities given min verbal and visual cueing. Progress: 90% given min verbal and visual cues. Goal met; to gauge carryover in next session. STG 4C: Patient will accurately complete moderate level complexity verbal reasoning/problem solving/deductive tasks with 85% accuracy given min verbal and visual cueing. Progress: 85% accuracy given min verbal and visual cues. Goal met; to gauge carryover in next session. STG 5B: Patient will divide attention between two moderate level complexity tasks given less than 1 verbal redirections/reminders. Progress: 2 visual/verbal reminders. HEP: Spaced retrieval for recall of pertinent and meaningful occupational and/or daily life information. Education: Educated on use of external cognitive supports, note-taking, and compensatory memory strategies. Assessment: Patient presents with cognitive-communication deficits following brain surgery. Patient's deficits impact his ability to effectively communicate desired information, ability to effectively recall desired information, and complete daily life, occupational, vocational, and social tasks. On this date, patient discussed working memory and long term memory and need to facilitate complex encoding of information for recall. Patient demonstrated frustration with recall of complex information (e.g., 3-4 components within paragraph length novel stimuli), however was able to recall following use of WRAP strategies and spaced retrieval methods.  From [facility], patient has demonstrated significant progress toward goals increasing attention to task and ability to utilize compensatory memory strategies and external cognitive supports. Patient continues to require support for use of internal memory strategies to engage recall of recent information and prospective information for planning. Plan: Continue skilled Speech Therapy 1x/week or a total of 10 visits over a 90 day period. Treatment will include: cognitive-communication therapy       Patient/Family/Caregiver was advised of these findings, precautions, and treatment options and has agreed to actively participate in planning and for this course of care. Thank you for your referral. If you have any questions, please contact me at Dept: [phone]. Sincerely,  Electronically signed by therapist: YONG Gaspar     Physician's certification required:  Yes  Please co-sign or sign and return this letter via fax as soon as possible to [phone]. I certify the need for these services furnished under this plan of treatment and while under my care.     X___________________________________________________ Date____________________    Certification From: 2/30/9255  To:12/20/2023

## 2023-09-25 DIAGNOSIS — R56.9 SEIZURES (HCC): Primary | ICD-10-CM

## 2023-09-26 RX ORDER — DIVALPROEX SODIUM 250 MG/1
500 TABLET, EXTENDED RELEASE ORAL DAILY
Qty: 60 TABLET | Refills: 1 | Status: SHIPPED | OUTPATIENT
Start: 2023-09-26

## 2023-09-26 NOTE — TELEPHONE ENCOUNTER
Per TE 08/28/2023 patient was advise to continue medication at this moment. Medication: divalproex  MG Oral Tablet 24 Hr      Date of last refill: 05/15/2023 (#60/1)  [place]     Last office visit: 05/15/2023  Due back to clinic per last office note:  3-4 months  Date next office visit scheduled:    Future Appointments   Date Time Provider Yeimy Foy   9/27/2023  8:00 AM Kit Pilar, LCSW [facility] PLF [facility] [place]   9/28/2023 12:15 PM Trevor Herring, YONG [facility]  [address]   10/5/2023 10:30 AM YONG [facility] YONG [place]   10/9/2023  8:00 AM Kit Pilar, LCSW [facility] [place]   10/12/2023 10:30 AM YONG [facility]   10/19/2023 11:30 AM YONG [facility] YONG [place] [facility]   10/20/2023  9:00 AM Tammie Hong DO EMG [address] [facility]   10/26/2023 10:30 AM YONG [facility] SLP [facility] AT [facility]   10/27/2023  8:00 AM Kit Pilar, LCSW [facility]F [facility] [place]   11/1/2023  9:40 AM Davi Pete DO ENIWSTACY [id number]   12/28/2023  7:30 AM PF CT RM1 PF CT [place]   2/9/2024  8:30 AM Curly Ang MD  EEMG Pulm [facility] [place]   3/18/2024  7:45 AM PF MRI RM1 (1.5T) PF MRI [place]   3/22/2024  9:00 AM Domo Roach MD ENINAPER2 EMG [place]           Last OV note recommendation:       Assessment: This is a 42 y/o male with a h/o a congential hydrocephalus s/p shunting and revision and seizures. He had another shunt revision in June 2023. His ASMs were adjusted during that hospitalization due to VPA interaction with imipenem given for infection. I will continue Depakote 500mg ER Qday for now. I will repeat the EEG and if no abnormalities we can consider tapering to 250mg ER Qday and then off if able. His initial seizure could have been induced by sepsis. Imaging studies and charts form recently hospitalization reviewed today. Plan:  1.  Seizures  - CTH and OSH records reviewed  - EEG reports reviewed  - OSH and neurosurgery charting reviewed  - Decrease Depakote to 500mg BID  - Repeat EEG. If no significant abnormalities we can try and taper off Depakote     A total of 45 minutes was spent with patient >50% of visit was spent in counseling and coordination of care, including discussion of diagnostic workup to date, discussion of medication side effects and plan for additional testing and monitoring as noted above; patient and family allowed to ask questions and all questions answered to the best of my ability.         RTC in 3-4 months

## 2023-09-28 ENCOUNTER — OFFICE VISIT (OUTPATIENT)
Dept: SPEECH THERAPY | Facility: HOSPITAL | Age: 43
End: 2023-09-28
Attending: FAMILY MEDICINE
Payer: COMMERCIAL

## 2023-09-28 PROCEDURE — 92507 TX SP LANG VOICE COMM INDIV: CPT

## 2023-09-28 NOTE — PROGRESS NOTES
Diagnosis:   Memory difficulty [R41.3]        Referring Provider: Gisela Campbell Date of Evaluation:   8/14/2023    Precautions:  None Next MD visit:   none scheduled  Date of Surgery: n/a   Insurance Primary/Secondary: [address] [place] [address] / N/A       # Auth Visits: 90 combined  Total Timed Treatment: 45 min  Date POC Expires: 11/12/2023  Total Treatment Time: 45 min  Charges: [id number]   Treatment Number: 13    Subjective: Patient arrived on time to session. Patient participated actively in therapeutic tasks. Pain: No pain reported on this date. Objective:  Goals: (to be met in 10 additional visits)   STG 1: Patient will verbalize 4 compensatory memory strategies (processing, working memory, short-term memory, attention, problem-solving) and describe 1 use per strategy given min verbal and visual cues. Progress: Patient demonstrated recall of 4 compensatory memory strategies during session tasks. Goal met. STG 2C: Patient will accurately complete working memory tasks within 90% of opportunities given min verbal and visual cueing. Progress: 85% accuracy given min verbal and visual cues. STG 3B: Patient will utilize accurate planning and note-taking skills within 90% of opportunities given min verbal and visual cueing. Progress: 90% given min verbal and visual cues. Goal met; see updated goal.    STG 3C: Patient will utilize accurate planning and note-taking skills within 95% of opportunities given min verbal and visual cueing. STG 4C: Patient will accurately complete moderate level complexity verbal reasoning/problem solving/deductive tasks with 85% accuracy given min verbal and visual cueing. Progress: 85% accuracy given min verbal and visual cues. Goal met; see updated goal.   STG 4D: Patient will accurately complete moderate level complexity verbal reasoning/problem solving/deductive tasks with 90% accuracy given min verbal and visual cueing.     STG 5B: Patient will divide attention between two moderate level complexity tasks given less than 1 verbal redirections/reminders. Progress: 2 visual/verbal reminders. HEP: Spaced retrieval for recall of pertinent and meaningful occupational and/or daily life information. Education: Educated on use of external cognitive supports, note-taking, and compensatory memory strategies. Assessment: Patient presents with cognitive-communication deficits following brain surgery. Patient's deficits impact his ability to effectively communicate desired information, ability to effectively recall desired information, and complete daily life, occupational, vocational, and social tasks. On this date, patient discussed practice with spaced retrieval methods in home. Patient stated that he was able to follow spaced retrieval for recall of article. Patient generated mnemonic devices for morning and evening routine with daughters. Able to utilize spaced retrieval to recall acronyms and steps in routine. Patient to practice carryover within the home setting. Plan: Continue speech-language therapy targeting cognitive-communication with specific focus on attention and working memory.

## 2023-09-28 NOTE — PATIENT INSTRUCTIONS
Acronyms:    HTBRS  How To Bring [employer] Sleep  Homework  Teeth brushed  Baths  Read books  Sleep    BTCBBL  Back To Amira Raines, Bring Back BriefMe made  Teeth brushed  Check homework  Bags packed  Breakfast  Lunch packed

## 2023-10-03 ENCOUNTER — OFFICE VISIT (OUTPATIENT)
Dept: SURGERY | Facility: CLINIC | Age: 43
End: 2023-10-03

## 2023-10-03 ENCOUNTER — LAB ENCOUNTER (OUTPATIENT)
Dept: LAB | Age: 43
End: 2023-10-03
Attending: FAMILY MEDICINE
Payer: COMMERCIAL

## 2023-10-03 DIAGNOSIS — R30.0 DYSURIA: ICD-10-CM

## 2023-10-03 DIAGNOSIS — Z12.5 SCREENING FOR PROSTATE CANCER: ICD-10-CM

## 2023-10-03 DIAGNOSIS — R82.90 URINE FINDING: Primary | ICD-10-CM

## 2023-10-03 LAB
APPEARANCE: CLEAR
BILIRUBIN: NEGATIVE
COMPLEXED PSA SERPL-MCNC: 1.44 NG/ML (ref ?–4)
GLUCOSE (URINE DIPSTICK): NEGATIVE MG/DL
KETONES (URINE DIPSTICK): NEGATIVE MG/DL
LEUKOCYTES: NEGATIVE
MULTISTIX LOT#: NORMAL NUMERIC
NITRITE, URINE: NEGATIVE
OCCULT BLOOD: NEGATIVE
PH, URINE: 7.5 (ref 4.5–8)
PROTEIN (URINE DIPSTICK): NEGATIVE MG/DL
SPECIFIC GRAVITY: 1.01 (ref 1–1.03)
URINE-COLOR: YELLOW
UROBILINOGEN,SEMI-QN: 0.2 MG/DL (ref 0–1.9)

## 2023-10-03 PROCEDURE — 81002 URINALYSIS NONAUTO W/O SCOPE: CPT | Performed by: PHYSICIAN ASSISTANT

## 2023-10-03 PROCEDURE — 99214 OFFICE O/P EST MOD 30 MIN: CPT | Performed by: PHYSICIAN ASSISTANT

## 2023-10-03 NOTE — PATIENT INSTRUCTIONS
Prostatitis   WHAT YOU SHOULD KNOW:   The prostate gland is a male sex gland. Fluid made by the prostate mixes with sperm (from the testicles) to make semen. Prostatitis (tyqo-mmn-NZTY-tis) is an infection or inflammation of the prostate gland. Men of any age can have prostatitis, and they may get it more than once. Prostatitis may be caused by certain diseases, infections, procedures, or a large prostate gland. Prostatitis is not contagious (not able to be spread) to a sexual partner. Symptoms of prostatitis may include pain, problems urinating, blood in your urine, and fever. Medicine and certain therapies can be used to treat prostatitis. The prostate gland is the size and shape of a walnut. It is found in front of the rectum (area of the intestines that holds your bowel movements). The prostate wraps around the urethra and the neck of the bladder. Chronic prostatitis is more common in older men. It is usually an inflammatory condition and not an infection. But, bacterial infection can also cause chronic prostatitis. It can cause pain in the rectum, urethra, bladder, or scrotum. It can also make you unable to fully empty the bladder. You may urinate often, or have burning with urination. Prostatitis may also cause painful ejaculation, erectile dysfunction, or prolonged erections. Things that may trigger prostatitis include:  Sitting for long periods of time (especially on a hard surface or sitting on things that shake)  Squats/deadlifts  Sudden onset (acute) prostatitis usually occurs in men younger than 28. It is from a bacterial infection. You may have severe symptoms such as fever, chills, muscle aches, and pain in the area between the scrotum and anus (perineum). You may have a hard time urinating, or have pain or burning when urinating. There may be blood or pus in the urine.     Prostatitis can sometimes take 4-6 weeks to resolve    Home care  These guidelines will help you care for yourself at home:  Avoid sitting at a 90 degree angle for long periods of time. Standing or reclining is preferable. Avoid sitting on anything that shakes (tractors, lawn-mowers, long car rides) if possible. You may use a donut while sitting to avoid excessive pressure on the perineum/prostate while sitting. Drink plenty of fluids/water. Avoid/limit dietary irritants such as coffee, tea, carbonated beverages, sugary things, citrus fruits/juice, spicy foods. To relieve pain, put ice packs on the inflamed area. You can make your own ice pack by putting ice cubes in a sealed plastic bag wrapped in a thin towel. Soaking once or twice daily in a hot bath or using a heating pad may help alleviate discomfort in men suffering from chronic prostatitis. Would avoid excessive heat in the setting of acute prostatitis. You may use over-the-counter medicines to control pain, unless another medicine was given. Ibuprofen is typically a very effective anti-inflammatory pain medication. You may take 400 mg twice daily for 2 weeks regardless of symptoms and as needed for discomfort thereafter. If you have chronic liver or kidney disease, talk with your healthcare provider before taking these medicines. Also talk with your provider if you've ever had a stomach ulcer or GI bleeding. Urinate often, do not hold your bladder. If you have symptoms such as weak stream your doctor may prescribe an alpha-blocker such as tamsulosin (flomax). Make sure chronic pain issues are under good control, especially back pain issues as this is a significant risk factor for prostatitis/pelvic pain. Talk to your primary care or back specialist if your pain is not under adequate control. Constipation causes straining and pain. Avoid constipation by eating natural laxatives such as prunes, fresh fruits, and whole-grain cereals. If needed, use a mild over-the-counter (OTC) laxative for constipation.  An OTC stool softener such as colace may be used to keep the stools soft. If sex is uncomfortable or painful, avoid until symptoms get better. There is some evidence, however, that regular ejaculation may actually help improve prostatitis symptoms. You may have sex if you feel well.

## 2023-10-04 ENCOUNTER — TELEPHONE (OUTPATIENT)
Dept: PHYSICAL THERAPY | Facility: HOSPITAL | Age: 43
End: 2023-10-04

## 2023-10-05 ENCOUNTER — APPOINTMENT (OUTPATIENT)
Dept: SPEECH THERAPY | Facility: HOSPITAL | Age: 43
End: 2023-10-05
Attending: FAMILY MEDICINE
Payer: COMMERCIAL

## 2023-10-08 DIAGNOSIS — F41.8 ANXIETY ABOUT HEALTH: ICD-10-CM

## 2023-10-09 ENCOUNTER — PATIENT MESSAGE (OUTPATIENT)
Dept: FAMILY MEDICINE CLINIC | Facility: CLINIC | Age: 43
End: 2023-10-09

## 2023-10-10 NOTE — TELEPHONE ENCOUNTER
Medication Quantity Refills Start End   ALPRAZolam 0.5 MG Oral Tab 90 tablet 1 8/25/2023 2/21/2024   Sig:   Take 1 tablet (0.5 mg total) by mouth nightly as needed. Route:   Oral     Order #:   [id number]       Patient should still have refills on file.

## 2023-10-12 ENCOUNTER — OFFICE VISIT (OUTPATIENT)
Dept: SPEECH THERAPY | Facility: HOSPITAL | Age: 43
End: 2023-10-12
Attending: FAMILY MEDICINE
Payer: COMMERCIAL

## 2023-10-12 PROCEDURE — 92507 TX SP LANG VOICE COMM INDIV: CPT

## 2023-10-12 NOTE — PROGRESS NOTES
Diagnosis:   Memory difficulty [R41.3]        Referring Provider: Ainsley Reyes Date of Evaluation:   8/14/2023    Precautions:  None Next MD visit:   none scheduled  Date of Surgery: n/a   Insurance Primary/Secondary: [address] [facility] / N/A       # Auth Visits: 90 combined  Total Timed Treatment: 45 min  Date POC Expires: 11/12/2023  Total Treatment Time: 45 min  Charges: [id number]   Treatment Number: 14    Subjective: Patient arrived on time to session. Patient participated actively in therapeutic tasks. Pain: No pain reported on this date. Objective:  Goals: (to be met in 10 additional visits)   STG 1: Patient will verbalize 4 compensatory memory strategies (processing, working memory, short-term memory, attention, problem-solving) and describe 1 use per strategy given min verbal and visual cues. Progress: Patient demonstrated recall of 4 compensatory memory strategies during session tasks. Goal met. STG 2C: Patient will accurately complete working memory tasks within 90% of opportunities given min verbal and visual cueing. Progress: 85% accuracy given min verbal and visual cues. STG 3C: Patient will utilize accurate planning and note-taking skills within 95% of opportunities given min verbal and visual cueing. Progress: 90% given min verbal and visual cues. STG 4D: Patient will accurately complete moderate level complexity verbal reasoning/problem solving/deductive tasks with 90% accuracy given min verbal and visual cueing. Progress: 85% accuracy given min verbal and visual cues. STG 5B: Patient will divide attention between two moderate level complexity tasks given less than 1 verbal redirections/reminders. Progress: 2 visual/verbal reminders. HEP: Spaced retrieval for recall of pertinent and meaningful occupational and/or daily life information. Education: Educated on use of external cognitive supports, note-taking, and compensatory memory strategies.      Assessment: Patient presents with cognitive-communication deficits following brain surgery. Patient's deficits impact his ability to effectively communicate desired information, ability to effectively recall desired information, and complete daily life, occupational, vocational, and social tasks. On this date, patient discussed home practice with use of mnemonic devices for morning and evening routine with daughters. Patient reported that he has been able to recall steps in the routine given the acronyms. Patient presented additional home problems d/t difficulty recalling tasks that he needs to complete and decreased attention. Generated compensatory support (external list) to engage task completion and problem solving for these areas of difficulty. Plan: Continue speech-language therapy targeting cognitive-communication with specific focus on attention and working memory.

## 2023-10-19 ENCOUNTER — OFFICE VISIT (OUTPATIENT)
Dept: SPEECH THERAPY | Facility: HOSPITAL | Age: 43
End: 2023-10-19
Attending: FAMILY MEDICINE
Payer: COMMERCIAL

## 2023-10-19 PROCEDURE — 92507 TX SP LANG VOICE COMM INDIV: CPT

## 2023-10-19 RX ORDER — ALPRAZOLAM 0.5 MG/1
0.5 TABLET ORAL NIGHTLY PRN
Qty: 90 TABLET | Refills: 1 | OUTPATIENT
Start: 2023-10-19 | End: 2024-04-16

## 2023-10-19 NOTE — PROGRESS NOTES
Diagnosis:   Memory difficulty [R41.3]        Referring Provider: Jinny Honeycutt Date of Evaluation:   8/14/2023    Precautions:  None Next MD visit:   none scheduled  Date of Surgery: n/a   Insurance Primary/Secondary: [address] [facility] / N/A       # Auth Visits: 90 combined  Total Timed Treatment: 45 min  Date POC Expires: 11/12/2023  Total Treatment Time: 45 min  Charges: [id number]   Treatment Number: 15    Subjective: Patient arrived on time to session accompanied by his daughter, Karla Veloz. Patient participated actively in therapeutic tasks. Pain: No pain reported on this date. Objective:  Goals: (to be met in 10 additional visits)   STG 1: Patient will verbalize 4 compensatory memory strategies (processing, working memory, short-term memory, attention, problem-solving) and describe 1 use per strategy given min verbal and visual cues. Progress: Patient demonstrated recall of 4 compensatory memory strategies during session tasks. Goal met. STG 2C: Patient will accurately complete working memory tasks within 90% of opportunities given min verbal and visual cueing. Progress: 90% accuracy given mod verbal and visual cues. STG 3C: Patient will utilize accurate planning and note-taking skills within 95% of opportunities given min verbal and visual cueing. Progress: 90% given min verbal and visual cues. STG 4D: Patient will accurately complete moderate level complexity verbal reasoning/problem solving/deductive tasks with 90% accuracy given min verbal and visual cueing. Progress: 90% accuracy given mod verbal and visual cues. STG 5B: Patient will divide attention between two moderate level complexity tasks given less than 1 verbal redirections/reminders. Progress: 2 visual/verbal reminders. HEP: Spaced retrieval for recall of pertinent and meaningful occupational and/or daily life information. Education: Educated on use of external cognitive supports, note-taking, and compensatory memory strategies. Assessment: Patient presents with cognitive-communication deficits following brain surgery. Patient's deficits impact his ability to effectively communicate desired information, ability to effectively recall desired information, and complete daily life, occupational, vocational, and social tasks. On this date, patient discussed HEP with use of visual list on refrigerator for daily tasks. Patient indicated that this support was assisting him in engaging follow through with home tasks. Patient completed alternating attention/working memory tasks given initial preparatory set and demonstrated increased ability to alternate attention between tasks. Plan: Continue speech-language therapy targeting cognitive-communication with specific focus on attention and working memory.

## 2023-10-20 ENCOUNTER — LAB ENCOUNTER (OUTPATIENT)
Dept: LAB | Age: 43
End: 2023-10-20
Attending: FAMILY MEDICINE
Payer: COMMERCIAL

## 2023-10-20 ENCOUNTER — OFFICE VISIT (OUTPATIENT)
Dept: FAMILY MEDICINE CLINIC | Facility: CLINIC | Age: 43
End: 2023-10-20
Payer: COMMERCIAL

## 2023-10-20 VITALS
SYSTOLIC BLOOD PRESSURE: 106 MMHG | OXYGEN SATURATION: 98 % | DIASTOLIC BLOOD PRESSURE: 64 MMHG | RESPIRATION RATE: 16 BRPM | BODY MASS INDEX: 24.46 KG/M2 | HEART RATE: 67 BPM | WEIGHT: 154 LBS | TEMPERATURE: 98 F | HEIGHT: 66.5 IN

## 2023-10-20 DIAGNOSIS — Z23 NEED FOR HPV VACCINATION: ICD-10-CM

## 2023-10-20 DIAGNOSIS — Z00.00 WELL ADULT EXAM: ICD-10-CM

## 2023-10-20 DIAGNOSIS — F41.8 ANXIETY ABOUT HEALTH: ICD-10-CM

## 2023-10-20 DIAGNOSIS — Z00.00 WELL ADULT EXAM: Primary | ICD-10-CM

## 2023-10-20 DIAGNOSIS — Z86.69 HX OF HYDROCEPHALUS: ICD-10-CM

## 2023-10-20 PROBLEM — H60.541 ACUTE ECZEMATOID OTITIS EXTERNA OF RIGHT EAR: Status: RESOLVED | Noted: 2021-10-31 | Resolved: 2023-10-20

## 2023-10-20 PROBLEM — H61.22 LEFT EAR IMPACTED CERUMEN: Status: RESOLVED | Noted: 2019-10-01 | Resolved: 2023-10-20

## 2023-10-20 PROBLEM — B99.9 INFECTION: Status: RESOLVED | Noted: 2023-06-15 | Resolved: 2023-10-20

## 2023-10-20 PROBLEM — M77.12 LATERAL EPICONDYLITIS OF LEFT ELBOW: Status: RESOLVED | Noted: 2019-10-01 | Resolved: 2023-10-20

## 2023-10-20 PROBLEM — D72.825 BANDEMIA: Status: RESOLVED | Noted: 2023-04-23 | Resolved: 2023-10-20

## 2023-10-20 LAB
CHOLEST SERPL-MCNC: 156 MG/DL (ref ?–200)
FASTING PATIENT LIPID ANSWER: YES
HDLC SERPL-MCNC: 69 MG/DL (ref 40–59)
LDLC SERPL CALC-MCNC: 76 MG/DL (ref ?–100)
NONHDLC SERPL-MCNC: 87 MG/DL (ref ?–130)
TRIGL SERPL-MCNC: 54 MG/DL (ref 30–149)
VLDLC SERPL CALC-MCNC: 8 MG/DL (ref 0–30)

## 2023-10-20 PROCEDURE — 90471 IMMUNIZATION ADMIN: CPT | Performed by: FAMILY MEDICINE

## 2023-10-20 PROCEDURE — 80061 LIPID PANEL: CPT | Performed by: FAMILY MEDICINE

## 2023-10-20 PROCEDURE — 3078F DIAST BP <80 MM HG: CPT | Performed by: FAMILY MEDICINE

## 2023-10-20 PROCEDURE — 3008F BODY MASS INDEX DOCD: CPT | Performed by: FAMILY MEDICINE

## 2023-10-20 PROCEDURE — 99396 PREV VISIT EST AGE 40-64: CPT | Performed by: FAMILY MEDICINE

## 2023-10-20 PROCEDURE — 3074F SYST BP LT 130 MM HG: CPT | Performed by: FAMILY MEDICINE

## 2023-10-20 PROCEDURE — 90651 9VHPV VACCINE 2/3 DOSE IM: CPT | Performed by: FAMILY MEDICINE

## 2023-10-20 RX ORDER — ESCITALOPRAM OXALATE 5 MG/1
5 TABLET ORAL DAILY
COMMUNITY
Start: 2023-10-17

## 2023-10-20 RX ORDER — ALPRAZOLAM 0.5 MG/1
0.5 TABLET ORAL NIGHTLY PRN
Qty: 90 TABLET | Refills: 1 | Status: SHIPPED | OUTPATIENT
Start: 2023-10-20 | End: 2024-04-17

## 2023-10-26 ENCOUNTER — OFFICE VISIT (OUTPATIENT)
Dept: SPEECH THERAPY | Facility: HOSPITAL | Age: 43
End: 2023-10-26
Attending: FAMILY MEDICINE
Payer: COMMERCIAL

## 2023-10-26 PROCEDURE — 92507 TX SP LANG VOICE COMM INDIV: CPT

## 2023-10-26 NOTE — PROGRESS NOTES
Diagnosis:   Memory difficulty [R41.3]        Referring Provider: Fiona Chase Date of Evaluation:   8/14/2023    Precautions:  None Next MD visit:   none scheduled  Date of Surgery: n/a   Insurance Primary/Secondary: [address] [facility] / N/A       # Auth Visits: 90 combined  Total Timed Treatment: 45 min  Date POC Expires: 11/12/2023  Total Treatment Time: 45 min  Charges: [id number]   Treatment Number: 16    Subjective: Patient arrived on time to session. Patient participated actively in therapeutic tasks. Pain: No pain reported on this date. Objective:  Goals: (to be met in 10 additional visits)   STG 1: Patient will verbalize 4 compensatory memory strategies (processing, working memory, short-term memory, attention, problem-solving) and describe 1 use per strategy given min verbal and visual cues. Progress: Patient demonstrated recall of 4 compensatory memory strategies during session tasks. Goal met. STG 2C: Patient will accurately complete working memory tasks within 90% of opportunities given min verbal and visual cueing. Progress: 90% accuracy given mod verbal and visual cues. STG 3C: Patient will utilize accurate planning and note-taking skills within 95% of opportunities given min verbal and visual cueing. Progress: 95% given mod verbal and visual cues. STG 4D: Patient will accurately complete moderate level complexity verbal reasoning/problem solving/deductive tasks with 90% accuracy given min verbal and visual cueing. Progress: 90% accuracy given mod verbal and visual cues. STG 5B: Patient will divide attention between two moderate level complexity tasks given less than 1 verbal redirections/reminders. Progress: 1 visual/verbal reminders. Goal met; to gauge carryover in next session. HEP: Spaced retrieval for recall of pertinent and meaningful occupational and/or daily life information.   Education: Educated on use of external cognitive supports, note-taking, and compensatory memory strategies. Assessment: Patient presents with cognitive-communication deficits following brain surgery. Patient's deficits impact his ability to effectively communicate desired information, ability to effectively recall desired information, and complete daily life, occupational, vocational, and social tasks. On this date, patient utilized visual support (phone calendar) and recall to schedule additional sessions. Patient benefited from preparatory set to support organization and problem solving. Patient benefited from verbal and visual cues and tolerated fading throughout tasks. Plan: Continue speech-language therapy targeting cognitive-communication with specific focus on attention and working memory.

## 2023-10-30 ENCOUNTER — OFFICE VISIT (OUTPATIENT)
Dept: SPEECH THERAPY | Facility: HOSPITAL | Age: 43
End: 2023-10-30
Attending: FAMILY MEDICINE
Payer: COMMERCIAL

## 2023-10-30 PROCEDURE — 92507 TX SP LANG VOICE COMM INDIV: CPT

## 2023-10-30 NOTE — PROGRESS NOTES
Diagnosis:   Memory difficulty [R41.3]        Referring Provider: Homer Mcdaniel Date of Evaluation:   8/14/2023    Precautions:  None Next MD visit:   none scheduled  Date of Surgery: n/a   Insurance Primary/Secondary: [address] [facility] / N/A       # Auth Visits: 90 combined  Total Timed Treatment: 45 min  Date POC Expires: 11/12/2023  Total Treatment Time: 45 min  Charges: [id number]   Treatment Number: 17    Subjective: Patient arrived on time to session. Patient participated actively in therapeutic tasks. Pain: No pain reported on this date. Objective:  Goals: (to be met in 10 additional visits)   STG 1: Patient will verbalize 4 compensatory memory strategies (processing, working memory, short-term memory, attention, problem-solving) and describe 1 use per strategy given min verbal and visual cues. Progress: Patient demonstrated recall of 4 compensatory memory strategies during session tasks. Goal met. STG 2C: Patient will accurately complete working memory tasks within 90% of opportunities given min verbal and visual cueing. Progress: 90% accuracy given mod verbal and visual cues. STG 3C: Patient will utilize accurate planning and note-taking skills within 95% of opportunities given min verbal and visual cueing. Progress: 95% given mod verbal and visual cues. STG 4D: Patient will accurately complete moderate level complexity verbal reasoning/problem solving/deductive tasks with 90% accuracy given min verbal and visual cueing. Progress: 90% accuracy given mod verbal and visual cues. STG 5B: Patient will divide attention between two moderate level complexity tasks given less than 1 verbal redirections/reminders. Progress: 1 visual/verbal reminders. Goal met; see updated goal.   STG 5C: Patient will independently divide attention between two moderate level complexity tasks. HEP: Spaced retrieval for recall of pertinent and meaningful occupational and/or daily life information.   Education: Educated on use of external cognitive supports, note-taking, and compensatory memory strategies. Assessment: Patient presents with cognitive-communication deficits following brain surgery. Patient's deficits impact his ability to effectively communicate desired information, ability to effectively recall desired information, and complete daily life, occupational, vocational, and social tasks. On this date, patient demonstrated improved carryover of skills to daily life tasks. Patient benefited from verbal and visual cues to facilitate recall of novel paragraph length information. Continues to demonstrate improved problem solving and reasoning skills. Plan: Continue speech-language therapy targeting cognitive-communication with specific focus on recall of novel paragraph length information and problem solving/reasoning.

## 2023-11-01 ENCOUNTER — OFFICE VISIT (OUTPATIENT)
Dept: NEUROLOGY | Facility: CLINIC | Age: 43
End: 2023-11-01
Payer: COMMERCIAL

## 2023-11-01 VITALS
SYSTOLIC BLOOD PRESSURE: 114 MMHG | BODY MASS INDEX: 25 KG/M2 | DIASTOLIC BLOOD PRESSURE: 65 MMHG | WEIGHT: 158.06 LBS | HEART RATE: 68 BPM | RESPIRATION RATE: 17 BRPM

## 2023-11-01 DIAGNOSIS — R56.9 SEIZURES (HCC): Primary | ICD-10-CM

## 2023-11-01 PROCEDURE — 99213 OFFICE O/P EST LOW 20 MIN: CPT | Performed by: OTHER

## 2023-11-01 PROCEDURE — 3074F SYST BP LT 130 MM HG: CPT | Performed by: OTHER

## 2023-11-01 PROCEDURE — 3078F DIAST BP <80 MM HG: CPT | Performed by: OTHER

## 2023-11-01 RX ORDER — DIVALPROEX SODIUM 250 MG/1
500 TABLET, EXTENDED RELEASE ORAL DAILY
Qty: 180 TABLET | Refills: 1 | Status: SHIPPED | OUTPATIENT
Start: 2023-11-01

## 2023-11-01 NOTE — PROGRESS NOTES
Neurology H&P    Kylee Catalan Patient Status:  No patient class for patient encounter    1980 MRN [id number]   Location [facility], VIR[facility] Attending No att. providers found   Hosp Day # 0 PCP Rosa Maria Arellano DO     Subjective:  Initial Clinic HPI 5/10/23  Kylee Catalan is a(n) 41year old male with a PMH significant for anxiety and hydrocephalus s/p VPS. He comes to the neurology clinic for evaluation and management of sezures. 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 He had a shunt revision in childhood and then again by Dr. Yunior Lira in . He comes with his wife today. He states that he was at a birthday party. He felt dizzy almost like an out of body experience when he entered the party. He states that sounds sounded muffled like a duglas brown cartoon. He was having difficulty functioning. He was unable to text on his phone. He was unable to form a complete thought or sentence. His wife states that he was not making any sense at all and his pupils were pinpoint. He had not had any EtOH or illicit's. His wife states that prior to getting to this party he was completely fine. His wife got him up and he was able to walk to the car. He has no recollection of this event. He was sheila to walk into the ED. He was unable to answeres any of the triage questions. He had a CTH and when brought back had a seizure. Per wife who witnessed this he made a sound like he was in pain or a groaning noise, his eyes then rolled back into his head. He then became very rigid and he started shaking all over. Per wife they gave him ativan and then this abated. He was admitted. He had 2 EEGs one of which showed frequent R temporal sharp waves per report and an MRI. He has followed up nuerosurgery as well. Interim History:  Pt was last seen in the clinic on 8/15/23. Since that time he has not had any seizures. He has been doing well.  No new numbness weakness or tinging. Current Medications:  Current Outpatient Medications   Medication Sig Dispense Refill    escitalopram 5 MG Oral Tab Take 1 tablet (5 mg total) by mouth daily. divalproex  MG Oral Tablet 24 Hr Take 2 tablets (500 mg total) by mouth daily. 60 tablet 1    Multiple Vitamins-Minerals (MULTIVITAMIN ADULT EXTRA C OR) Take 1 tablet by mouth daily. omega-3 fatty acids 1000 MG Oral Cap Take 1,000 mg by mouth daily. ALPRAZolam 0.5 MG Oral Tab Take 1 tablet (0.5 mg total) by mouth nightly as needed.  (Patient not taking: Reported on 11/1/2023) 90 tablet 1       Problem List:  Patient Active Problem List:     Hx of hydrocephalus     Family history of skin cancer     Anxiety     Shunt malfunction     Scar of neck     Seizures ([address]     Infection of  (ventriculoperitoneal) shunt ([facility])      PMHx:  Past Medical History:   Diagnosis Date    Abdominal pain 01/2020    ALCOHOL USE 2003    Alcoholism on both sides    Allergic rhinitis 2001    Anxiety 10/6/2021    After performing the test asked by dr Camille Levy i had an anxiety    Anxiety state     Bloating 01/2020    Diarrhea, unspecified 11/01/2022    Enlarged prostate     Flatulence/gas pain/belching 01/2020    Frequent urination 01/2020    Heartburn 01/2020    Hydrocephalus ([address] 1983    Indigestion 01/2020    Nodule of right lung     S/P  shunt     Seizure ([address]     Sleep disturbance 01/2020    Stress     Visual impairment     glasses    Wears glasses        PSHx:  Past Surgical History:   Procedure Laterality Date    BRAIN SURGERY  06/13/2023    BRAIN SURGERY  06/20/2023    COLONOSCOPY N/A 06/20/2023    Procedure: COLONOSCOPY with retreival of foreign body;  Surgeon: Erica Dunlap DO;  Location: [address] [place] [address] ENDOSCOPY    HC IMPLANT SHUNT      HYDROCEPHALUS SHUNTED [address]    OTHER  11/08/2021    VENTRICULOPERITONEAL SHUNT REVISION,  REVISION DISTAL CATHETER ONLY AT RIGHT ANTERIOR CERVICAL REGION (Steven John), Excision of right neck cyst, scar  Marco Molly)    [address] [place] [address] Rd, 11/8/2021    Hydracephalus , shunts placed. 11/8 revision    OTHER SURGICAL HISTORY N/A 06/12/2023    RIGHT OCCIPITAL SHUNT REMOVAL, LEFT  SHUNT EXTERNALIZATION AND  SHUNT TAP    RECONSTR NOSE  1996    VASECTOMY  2021       SocHx:  Social History     Socioeconomic History    Marital status:    Tobacco Use    Smoking status: Former    Smokeless tobacco: Never    Tobacco comments:     Passive   Vaping Use    Vaping Use: Never used   Substance and Sexual Activity    Alcohol use: Yes     Alcohol/week: 4.0 standard drinks of alcohol     Types: 4 Cans of beer per week    Drug use: No    Sexual activity: Yes     Partners: Female   Other Topics Concern    Pt has a pacemaker No    Pt has a defibrillator No    Reaction to local anesthetic No    Caffeine Concern Yes     Comment: coffee in the morning    Exercise No     Comment: limted    Seat Belt Yes    Special Diet No    Stress Concern No    Weight Concern No        Family History:  Family History   Problem Relation Age of Onset    No Known Problems Father     No Known Problems Mother     Cancer Maternal Grandmother         SKIN CANCER    Heart Disease Maternal Grandmother     Alcohol and Other Disorders Associated Maternal Grandfather     Anxiety Maternal Grandfather     Heart Disease Maternal Grandfather     Cancer Paternal Grandmother         SKIN CANCER    Heart Disease Paternal Grandmother     Hypertension Paternal Grandfather     Alcohol and Other Disorders Associated Paternal Grandfather     Stroke Paternal Grandfather     Heart Disease Paternal Grandfather           ROS:  10 point ROS completed and was negative, except for pertinent positive and negatives stated in subjective. Objective/Physical Exam:    Vital Signs:  Blood pressure 114/65, pulse 68, resp. rate 17, weight 158 lb 1.1 oz (71.7 kg).     Gen: Awake and in no apparent distress  HEENT: moist mucus membranes  Neck: Supple  Cardiovascular: Regular rate and rhythm, no murmur  Pulm: CTAB  GI: non-tender, normal bowel sounds  Skin: normal, dry  Extremities: No clubbing or cyanosis      Neurologic:   MENTAL STATUS: alert, ox3, normal attention, language and fund of knowledge. CRANIAL NERVES II to XII: PERRLA, no ptosis or diplopia, EOM intact, facial sensation intact, strong eye closure, face is symmetric, no dysarthria, tongue midline,  no tongue fasciculations or atrophy, strong shoulder shrug. MOTOR EXAMINATION: normal tone, no fasciculations, normal strength throughout in UEs and LEs      SENSORY EXAMINATION:  UE: intact to light touch, pinprick intact  LE: intact to light touch, pinprick intact    COORDINATION:  No dysmetria, or intention tremors     REFLEXES: 2+ at biceps, 3+ brachioradialis, 3+ at patella, 2+ at the ankles, + BUE Hoffmans    GAIT: normal stance,  tandem well. Romberg's: negative      Labs:       Imaging:  [facility] 12/3/21  FINDINGS:         Right posterior temporal ventriculostomy catheter that extends into CSF cystic space adjacent to the atria the right lateral ventricle is stable. Right suboccipital catheter extending to the apex of the tentorium and adjacent to the straight sinus is   stable. Left parietal ventriculostomy catheter has tip adjacent to the septum pellucidum and in the right frontal horn is stable. Additional left parietal ventriculostomy catheter extending along the subdural space/subarachnoid space is stable. Sequelae of left frontal parietal craniotomy and right temporal craniotomy is noted. Dilated CSF space adjacent to the right lateral ventricle is stable. Mild asymmetric prominence of the right temporal horn with CSF space extends over the tentorium and adjacent to the right cerebral peduncle is stable. Arachnoid cyst adjacent to the right cerebellar hemisphere is relatively stable. Associated mass effect is unchanged.        Cerebellar tonsillar ectopia is stable. Mild to moderate mucoperiosteal thickening of the paranasal sinuses is stable. Impression   CONCLUSION:         No significant changes since prior exam.          EEG 4/24/23  Interpretation: This is an abnormal EEG due to frequent high amplitude slow waves in the   right temporal area, the majority of which do not appear epileptiform. Clinical Correlation:     The EEG is improved compared to yesterday but still shows significant   neuronal dysfunction and risk for seizures from the right temporal area. EEG 4/23/23  Interpretation: This is an abnormal EEG due to slowing and frequent epileptiform   discharges in the right temporal area. Clinical Correlation: The finding indicates a significant risk for further right temporal lobe   seizures. EEG 8/2023  IMPRESSION:  This EEG is a normal study recorded in the awake and asleep states. No focal, lateralizing, or epileptiform activity is seen and no seizures are recorded. Report covers  Start 8/24/23 at 1154  End 8/24/23 at 1252      Assessment: This is a 40 y/o male with a h/o a congential hydrocephalus s/p shunting and revision and seizures. I will continue Depakote 500mg ER Qday for now. I will repeat the EEG in April of 2024 which will be 1 year after his last seizures. If it looks ok then we can consider tapering off his depakote      Plan:  1.  Seizures  - CTH and OSH records reviewed  - EEG reports reviewed  - EEG in 8/2023 was normal  - Continue Depakote 500mg ER Qday            RTC in 6 months    Miguel Cortés DO  Neurology

## 2023-11-06 ENCOUNTER — OFFICE VISIT (OUTPATIENT)
Dept: SPEECH THERAPY | Facility: HOSPITAL | Age: 43
End: 2023-11-06
Attending: FAMILY MEDICINE
Payer: COMMERCIAL

## 2023-11-06 PROCEDURE — 92507 TX SP LANG VOICE COMM INDIV: CPT

## 2023-11-06 NOTE — PROGRESS NOTES
Diagnosis:   Memory difficulty [R41.3]        Referring Provider: Oanh Cabrera Date of Evaluation:   8/14/2023    Precautions:  None Next MD visit:   none scheduled  Date of Surgery: n/a   Insurance Primary/Secondary: [address] [place] [address] N/A       # Auth Visits: 90 combined  Total Timed Treatment: 45 min  Date POC Expires: 11/12/2023  Total Treatment Time: 45 min  Charges: [id number]   Treatment Number: 18    Subjective: Patient arrived on time to session. Patient participated actively in therapeutic tasks. Pain: No pain reported on this date. Objective:  Goals: (to be met in 10 additional visits)   STG 1: Patient will verbalize 4 compensatory memory strategies (processing, working memory, short-term memory, attention, problem-solving) and describe 1 use per strategy given min verbal and visual cues. Progress: Patient demonstrated recall of 4 compensatory memory strategies during session tasks. Goal met. STG 2C: Patient will accurately complete working memory tasks within 90% of opportunities given min verbal and visual cueing. Progress: 90% accuracy given mod verbal and visual cues. STG 3C: Patient will utilize accurate planning and note-taking skills within 95% of opportunities given min verbal and visual cueing. Progress: 95% given mod verbal and visual cues. STG 4D: Patient will accurately complete moderate level complexity verbal reasoning/problem solving/deductive tasks with 90% accuracy given min verbal and visual cueing. Progress: 90% accuracy given mod verbal and visual cues. STG 5C: Patient will independently divide attention between two moderate level complexity tasks. Progress: 1 visual/verbal reminders. HEP: Spaced retrieval for recall of pertinent and meaningful occupational and/or daily life information. Education: Educated on use of external cognitive supports, note-taking, and compensatory memory strategies.      Assessment: Patient presents with cognitive-communication deficits following brain surgery. Patient's deficits impact his ability to effectively communicate desired information, ability to effectively recall desired information, and complete daily life, occupational, vocational, and social tasks. On this date, patient demonstrated improved ability to utilize reasoning and problem solving skills for daily life tasks. Patient benefited from verbal and visual cues to support working memory during non-structured recall of complex novel stimuli. Plan: Continue speech-language therapy targeting cognitive-communication.

## 2023-11-13 ENCOUNTER — OFFICE VISIT (OUTPATIENT)
Dept: SPEECH THERAPY | Facility: HOSPITAL | Age: 43
End: 2023-11-13
Attending: FAMILY MEDICINE
Payer: COMMERCIAL

## 2023-11-13 PROCEDURE — 92507 TX SP LANG VOICE COMM INDIV: CPT

## 2023-11-13 NOTE — PROGRESS NOTES
Diagnosis:   Memory difficulty [R41.3]        Referring Provider: Kymberly Rogel Date of Evaluation:   8/14/2023    Precautions:  None Next MD visit:   none scheduled  Date of Surgery: n/a   Insurance Primary/Secondary: [address] [facility] N/A       # Auth Visits: 90 combined  Total Timed Treatment: 45 min  Date POC Expires: 11/12/2023  Total Treatment Time: 45 min  Charges: [id number]   Treatment Number: 19    Subjective: Patient arrived on time to session. Patient participated actively in therapeutic tasks. Pain: No pain reported on this date. Objective:  Goals: (to be met in 10 additional visits)   STG 1: Patient will verbalize 4 compensatory memory strategies (processing, working memory, short-term memory, attention, problem-solving) and describe 1 use per strategy given min verbal and visual cues. Progress: Patient demonstrated recall of 4 compensatory memory strategies during session tasks. Goal met. STG 2C: Patient will accurately complete working memory tasks within 90% of opportunities given min verbal and visual cueing. Progress: 90% accuracy given min verbal and visual cues. Goal met; see updated goal.   STG 2D: Patient will accurately complete working memory tasks within 95% of opportunities given min verbal and visual cueing. STG 3C: Patient will utilize accurate planning and note-taking skills within 95% of opportunities given min verbal and visual cueing. Progress: 95% given min verbal and visual cues. Goal met; to gauge carryover in next session. STG 4D: Patient will accurately complete moderate level complexity verbal reasoning/problem solving/deductive tasks with 90% accuracy given min verbal and visual cueing. Progress: 90% accuracy given mod verbal and visual cues. STG 5C: Patient will independently divide attention between two moderate level complexity tasks. Progress: 1 visual/verbal reminders.      HEP: Spaced retrieval for recall of pertinent and meaningful occupational and/or daily life information. Education: Educated on use of external cognitive supports, note-taking, and compensatory memory strategies. Assessment: Patient presents with cognitive-communication deficits following brain surgery. Patient's deficits impact his ability to effectively communicate desired information, ability to effectively recall desired information, and complete daily life, occupational, vocational, and social tasks. On this date, patient completed working memory and recall tasks. Patient benefits from cueing to support generalization of skills. Patient has demonstrated progress and will benefit from continued therapy to support carryover. Plan: Continue speech-language therapy targeting cognitive-communication.

## 2023-11-15 ENCOUNTER — APPOINTMENT (OUTPATIENT)
Dept: SPEECH THERAPY | Facility: HOSPITAL | Age: 43
End: 2023-11-15
Attending: FAMILY MEDICINE
Payer: COMMERCIAL

## 2023-11-22 ENCOUNTER — OFFICE VISIT (OUTPATIENT)
Dept: SPEECH THERAPY | Facility: HOSPITAL | Age: 43
End: 2023-11-22
Attending: FAMILY MEDICINE
Payer: COMMERCIAL

## 2023-11-22 PROCEDURE — 92507 TX SP LANG VOICE COMM INDIV: CPT

## 2023-11-22 NOTE — PROGRESS NOTES
Diagnosis:   Memory difficulty [R41.3]        Referring Provider: Russel Villalobos Date of Evaluation:   8/14/2023    Precautions:  None Next MD visit:   none scheduled  Date of Surgery: n/a   Insurance Primary/Secondary: [facility] N/A       # Auth Visits: 90 combined  Total Timed Treatment: 45 min  Date POC Expires: 11/12/2023  Total Treatment Time: 45 min  Charges: [id number]   Treatment Number: 20    Subjective: Patient arrived on time to session. Patient participated actively in therapeutic tasks. Pain: No pain reported on this date. Objective:  Goals: (to be met in 10 additional visits)   STG 1: Patient will verbalize 4 compensatory memory strategies (processing, working memory, short-term memory, attention, problem-solving) and describe 1 use per strategy given min verbal and visual cues. Progress: Patient demonstrated recall of 4 compensatory memory strategies during session tasks. Goal met. STG 2D: Patient will accurately complete working memory tasks within 95% of opportunities given min verbal and visual cueing. Progress: 90% accuracy given min verbal and visual cues. STG 3C: Patient will utilize accurate planning and note-taking skills within 95% of opportunities given min verbal and visual cueing. Progress: 95% given min verbal and visual cues. Goal met; to gauge carryover in next sessions. STG 4D: Patient will accurately complete moderate level complexity verbal reasoning/problem solving/deductive tasks with 90% accuracy given min verbal and visual cueing. Progress: 90% accuracy given min verbal and visual cues. Goal met; to gauge carryover in next sessions. STG 5C: Patient will independently divide attention between two moderate level complexity tasks. Progress: 1 visual/verbal reminders. HEP: Spaced retrieval for recall of pertinent and meaningful occupational and/or daily life information.   Education: Educated on use of external cognitive supports, note-taking, and compensatory memory strategies. Assessment: Patient presents with cognitive-communication deficits following brain surgery. Patient's deficits impact his ability to effectively communicate desired information, ability to effectively recall desired information, and complete daily life, occupational, vocational, and social tasks. On this date, patient completed working memory and recall tasks. Patient benefited from initial preparatory set to support recall of novel stimuli. Patient benefited from min cueing to engage recall and processing of visual and auditory information. Patient increased working memory and ability to alternate attention within non-structured tasks. Plan: Continue speech-language therapy targeting cognitive-communication.

## 2023-11-29 ENCOUNTER — OFFICE VISIT (OUTPATIENT)
Dept: SPEECH THERAPY | Facility: HOSPITAL | Age: 43
End: 2023-11-29
Attending: FAMILY MEDICINE
Payer: COMMERCIAL

## 2023-11-29 PROCEDURE — 92507 TX SP LANG VOICE COMM INDIV: CPT

## 2023-11-29 NOTE — PROGRESS NOTES
Diagnosis:   Memory difficulty [R41.3]        Referring Provider: Aidan Burger Date of Evaluation:   8/14/2023    Precautions:  None Next MD visit:   none scheduled  Date of Surgery: n/a   Insurance Primary/Secondary: [address] [place] [address] / N/A       # Auth Visits: 90 combined  Total Timed Treatment: 45 min  Date POC Expires: 11/12/2023  Total Treatment Time: 45 min  Charges: [id number]   Treatment Number: 21    Subjective: Patient arrived on time to session. Patient participated actively in therapeutic tasks. Pain: No pain reported on this date. Objective:  Goals: (to be met in 10 additional visits)   STG 1: Patient will verbalize 4 compensatory memory strategies (processing, working memory, short-term memory, attention, problem-solving) and describe 1 use per strategy given min verbal and visual cues. Progress: Patient demonstrated recall of 4 compensatory memory strategies during session tasks. Goal met. STG 2D: Patient will accurately complete working memory tasks within 95% of opportunities given min verbal and visual cueing. Progress: 95% accuracy given intermittent verbal and visual cues. Goal met; to gauge carryover in final d/c session. STG 3C: Patient will utilize accurate planning and note-taking skills within 95% of opportunities given min verbal and visual cueing. Progress: 95% given intermittent verbal and visual cues. Goal met; to gauge carryover in final d/c session. STG 4D: Patient will accurately complete moderate level complexity verbal reasoning/problem solving/deductive tasks with 90% accuracy given min verbal and visual cueing. Progress: 95% accuracy given intermittent verbal and visual cues. Goal met; to gauge carryover in final d/c session. STG 5C: Patient will independently divide attention between two moderate level complexity tasks. Progress: Independent. Goal met; to gauge carryover in final d/c session.      HEP: Spaced retrieval for recall of pertinent and meaningful occupational and/or daily life information. Education: Educated on use of external cognitive supports, note-taking, and compensatory memory strategies. Assessment: Patient presents with cognitive-communication deficits following brain surgery. Patient's deficits impact his ability to effectively communicate desired information, ability to effectively recall desired information, and complete daily life, occupational, vocational, and social tasks. On this date, patient completed working memory and recall tasks. Patient has demonstrated significant improvement and readiness for d/c in next session. Patient increased ability to utilize compensatory strategies and external cognitive supports with independence. Plan: Continue speech-language therapy targeting cognitive-communication. Patient to have one final d/c session.

## 2023-12-06 ENCOUNTER — OFFICE VISIT (OUTPATIENT)
Dept: SPEECH THERAPY | Facility: HOSPITAL | Age: 43
End: 2023-12-06
Attending: FAMILY MEDICINE
Payer: COMMERCIAL

## 2023-12-06 PROCEDURE — 92507 TX SP LANG VOICE COMM INDIV: CPT

## 2023-12-06 NOTE — PROGRESS NOTES
Diagnosis:   Memory difficulty [R41.3]        Referring Provider: Annmarie Chew Date of Evaluation:   8/14/2023    Precautions:  None Next MD visit:   none scheduled  Date of Surgery: n/a   Insurance Primary/Secondary: [facility] N/A       # Auth Visits: 90 combined  Total Timed Treatment: 45 min  Date POC Expires: 11/12/2023  Total Treatment Time: 45 min  Charges: [id number]   Treatment Number: 22     Discharge Summary  Pt has attended 22 visits in Speech Therapy. Dear Dr. Annmarie Chew,  This letter is to inform you of [place] progress in speech-language therapy. Since his initial evaluation, Giuseppe Watson has attended 22 sessions. Therapy sessions have targeted cognitive-communication within the areas of working memory, recall, attention, executive functioning, and reasoning/problem solving. A home exercise program (HEP) addressing use of compensatory strategies (internal and external) has been provided and completed consistently. During this treatment period, Giuseppe Watson has demonstrated improvement in cognitive-communication skills and has reported carryover to daily living and occupational tasks. As Giuseppe Watson has demonstrated significant progress and has become independent with implementation of supports/strategies, it is recommended that he be discharged from speech therapy at this time. Thank you for your referral. Please re-consult should further concerns arise. Subjective: Patient arrived on time to session. Patient participated actively in therapeutic tasks. Pain: No pain reported on this date. Objective:  Goals: (to be met in 10 additional visits)   STG 1: Patient will verbalize 4 compensatory memory strategies (processing, working memory, short-term memory, attention, problem-solving) and describe 1 use per strategy given min verbal and visual cues. Progress: Patient demonstrated recall of 4 compensatory memory strategies during session tasks. Goal met.      STG 2D: Patient will accurately complete working memory tasks within 95% of opportunities given min verbal and visual cueing. Progress: 95% accuracy given intermittent verbal and visual cues. Goal met. STG 3C: Patient will utilize accurate planning and note-taking skills within 95% of opportunities given min verbal and visual cueing. Progress: 95% given intermittent verbal and visual cues. Goal met. STG 4D: Patient will accurately complete moderate level complexity verbal reasoning/problem solving/deductive tasks with 90% accuracy given min verbal and visual cueing. Progress: 95% accuracy given intermittent verbal and visual cues. Goal met. STG 5C: Patient will independently divide attention between two moderate level complexity tasks. Progress: Independent. Goal met. The patient was administered the Cognitive Linguistic Quick Test (CLQT) to assess cognitive functions in the following domains: attention, memory, executive functions, language, visuospatial skills, and clock drawing. Discharge (2023): WNL for [age] years old:  Administered the Cognitive Linguistic Quick Test (CLQT). CLQT scores are as follows: Attention: 206 (CXT=724-128); Memory: 178 (TOW=518-951); Executive Functions 32 (WNL=24-40); Language: 35 (WNL=29-30); Visuospatial Skills: 76 (WNL=); Clock Drawin (WNL=12-13). Composite Cognitive Score 3.8 (WNL 3.5-4.0). Subtests:  Personal Facts= 8 (average=8)  Symbol Cancellations= 12 (average=11)  Confrontation Naming= 10 (average=10)  Clock Drawing= 13 (average=12)  Story Retelling= 9 (average=6)  Symbol Trails= 10 (average=9)  Generative Naming= 8 (average=5)  Design Memory= 6 (average=5)  Mazes= 8 (average=7)  Design Generation= 5 (average=6)    Evaluation (2023): WNL for [age] years old:  Administered the Cognitive Linguistic Quick Test (CLQT). CLQT scores are as follows: Attention: 203 (VWK=413-664); Memory: 172 (YPW=279-427); Executive Functions 31 (WNL=24-40);  Language: 34 (WNL=29-30); Visuospatial Skills: 75 (WNL=); Clock Drawin (WNL=12-13). Composite Cognitive Score 3.8 (WNL 3.5-4.0). Subtests:  Personal Facts= 8 (average=8)  Symbol Cancellations= 12 (average=11)  Confrontation Naming= 10 (average=10)  Clock Drawing= 12 (average=12)  Story Retelling= 8 (average=6)  Symbol Trails= 10 (average=9)  Generative Naming= 8 (average=5)  Design Memory= 6 (average=5)  Mazes= 8 (average=7)  Design Generation= 5 (average=6)     Cognitive Function Short Form 8a: This patient reported outcome measure is utilized to determine patients' perceptions of cognitive function and impact on daily life activities. Score: 26/40 (patient scored sometimes/2-3 times for 6/8 questions)  A lower score indicates more severe perception of cognitive function. HEP: Spaced retrieval for recall of pertinent and meaningful occupational and/or daily life information. Education: Educated on use of external cognitive supports, note-taking, and compensatory memory strategies. Assessment: Patient presented to speech therapy with cognitive-communication deficits following brain surgery. Patient's deficits impacted his ability to effectively communicate desired information, ability to effectively recall desired information, and complete daily life, occupational, vocational, and social tasks. Throughout plan of care, patient has demonstrated significant improvement in cognitive-communication skills and has employed internal and external strategies independently. As patient is independent and has demonstrated carryover of skills to non-structured home environments, it is recommended that he be discharged from speech-language therapy services. Plan: Patient to be discharged from speech-language therapy as all goals have been met. Patient/Family/Caregiver was advised of these findings, precautions, and treatment options and has agreed to actively participate in planning and for this course of care.     Thank you for your referral. If you have any questions, please contact me at Dept: [phone].     Sincerely,  Electronically signed by therapist: Padmini Marquez SLP

## 2023-12-13 ENCOUNTER — APPOINTMENT (OUTPATIENT)
Dept: SPEECH THERAPY | Facility: HOSPITAL | Age: 43
End: 2023-12-13
Attending: FAMILY MEDICINE
Payer: COMMERCIAL

## 2023-12-28 ENCOUNTER — HOSPITAL ENCOUNTER (OUTPATIENT)
Dept: CT IMAGING | Age: 43
Discharge: HOME | End: 2023-12-28
Attending: INTERNAL MEDICINE
Payer: COMMERCIAL

## 2023-12-28 DIAGNOSIS — R91.8 MULTIPLE PULMONARY NODULES: ICD-10-CM

## 2023-12-28 PROCEDURE — 71250 CT THORAX DX C-: CPT | Performed by: INTERNAL MEDICINE

## 2024-01-23 ENCOUNTER — PATIENT MESSAGE (OUTPATIENT)
Dept: SURGERY | Facility: CLINIC | Age: 44
End: 2024-01-23

## 2024-01-23 NOTE — TELEPHONE ENCOUNTER
Noted the patient message listed below.     Noted the 2 surgeries the patient had on 6/12/2023 and 6/15/2023 with the neurosurgeons    Noted the patient LOV  9/18/2023 with Dr. Good:  \"ASSESSMENT:  42yo male with a complex shunt history of recent infection and removal of some of his shunt systems and exploratory laparotomy in June 2023     I spoke to the patient and his wife at length about his current symptoms. We discussed concerning signs and symptoms to seek medical attention. We also discussed the importance of establishing psychiatric care, as this appears to be a large contributor to his quality of life current. I discussed with him that he has no physical limitations at this time, but to listen to his body.     Plan:  - Follow up with Dr. Bowens as scheduled for antiepileptic management  - Continue cognitive therapy for memory  - Follow up in clinic in six months with a repeat MRI brain with and without contrast\"    Routed to Dr. Good and TED Alba for review and feedback

## 2024-01-23 NOTE — TELEPHONE ENCOUNTER
From: Galileo Villaseñor  To: Viraj Good  Sent: 1/23/2024 9:14 AM CST  Subject: Growth/possible cyst on surgical site     Hi Dr. Good,    Happy New Year! It’s been a bit, so I guess that means the Lexapro is working. :) However, about a month ago I noticed a growth on my right side behind my ear. I believe it sits on a scar from a surgical site from last summer’s surgery. I have attached a picture. I asked the dermatologist to excise and do a biopsy on this. That will take a week. However, (while we are 7 months post op and the while unlikely malignancy is being covered in an MRI in March) I wanted to attach a picture and ask if this is anything to worry about and worth an MRI. The dermatologist did say that the area looks aggravated so it’s possible I was picking at it. It’s more than likely a zit turned cyst. However, I did want your take, when time allows. Thank you in advance, and I promise not to bother you endlessly.     Thanks,    Galileo

## 2024-02-07 ENCOUNTER — OFFICE VISIT (OUTPATIENT)
Dept: SURGERY | Facility: CLINIC | Age: 44
End: 2024-02-07
Payer: COMMERCIAL

## 2024-02-07 DIAGNOSIS — L90.5 SCAR OF NECK: Primary | ICD-10-CM

## 2024-02-07 PROCEDURE — 99213 OFFICE O/P EST LOW 20 MIN: CPT | Performed by: SURGERY

## 2024-02-07 NOTE — CONSULTS
Estabilshed Patient Consultation    Chief Complaint: Right posterior scalp epidermoid cyst    History of Present Illness:   Galileo Villaseñor is a 43 year old male who returns to the office for evaluation of a right posterior scalp epidermoid cyst.  He saw a dermatologist who debrided this lesion.  It is now healing well.  He is s/p excision of right neck open wound granuloma, skin and subcutaneous tissue, complex layered wound closure right neck on 6/10/2023. This was performed at the same time as his surgery for his infected  shunt. He previously underwent revision of ventriculopperitoneal shunt (Dr. Aaron) and excision of granuloma, right neck cyst and complex layered wound closure right neck on 11/8/2021.     Past Medical History:      Past Medical History:   Diagnosis Date    Abdominal pain 01/2020    ALCOHOL USE 2003    Alcoholism on both sides    Allergic rhinitis 2001    Anxiety 10/6/2021    After performing the test asked by dr aaron i had an anxiety    Anxiety state     Bloating 01/2020    Diarrhea, unspecified 11/01/2022    Enlarged prostate     Flatulence/gas pain/belching 01/2020    Frequent urination 01/2020    Heartburn 01/2020    Hydrocephalus (HCC) 1983    Indigestion 01/2020    Nodule of right lung     S/P  shunt     Seizure (HCC)     Sleep disturbance 01/2020    Stress     Visual impairment     glasses    Wears glasses          Past Surgical History:  Past Surgical History:   Procedure Laterality Date    BRAIN SURGERY  06/13/2023    BRAIN SURGERY  06/20/2023    COLONOSCOPY N/A 06/20/2023    Procedure: COLONOSCOPY with retreival of foreign body;  Surgeon: Anibal Hodges DO;  Location:  ENDOSCOPY    HC IMPLANT SHUNT      HYDROCEPHALUS SHUNTED TWICE    HERNIA SURGERY  1983    OTHER  11/08/2021    VENTRICULOPERITONEAL SHUNT REVISION,  REVISION DISTAL CATHETER ONLY AT RIGHT ANTERIOR CERVICAL REGION (CAREY), Excision of right neck cyst, scar  ([facility])    OTHER SURGICAL HISTORY  1983, 1987,  11/8/2021    Hydracephalus , shunts placed. 11/8 revision    OTHER SURGICAL HISTORY N/A 06/12/2023    RIGHT OCCIPITAL SHUNT REMOVAL, LEFT  SHUNT EXTERNALIZATION AND  SHUNT TAP    RECONSTR NOSE  1996    VASECTOMY  2021         Medications:    No outpatient medications have been marked as taking for the 2/7/24 encounter (Office Visit) with Fanny Ackerman MD.         Allergies:    Allergies   Allergen Reactions    Penicillins HIVES         Family History:   Family History   Problem Relation Age of Onset    No Known Problems Father     No Known Problems Mother     Cancer Maternal Grandmother         SKIN CANCER    Heart Disease Maternal Grandmother     Alcohol and Other Disorders Associated Maternal Grandfather     Anxiety Maternal Grandfather     Heart Disease Maternal Grandfather     Cancer Paternal Grandmother         SKIN CANCER    Heart Disease Paternal Grandmother     Hypertension Paternal Grandfather     Alcohol and Other Disorders Associated Paternal Grandfather     Stroke Paternal Grandfather     Heart Disease Paternal Grandfather          Social History:  History   Alcohol Use    4.0 standard drinks of alcohol/week    4 Cans of beer per week       History   Smoking Status    Former   Smokeless Tobacco    Never       History   Drug Use No         Review of Systems:    The patient reports: see HPI  All other systems are unremarkable.      Physical Exam:    There were no vitals taken for this visit.    Constitutional: The patient is an alert, oriented and well-developed.     Neurologic: Speech patterns and movements are normal.     Psychiatric: Affect is appropriate.    Eyes: Conjunctiva are clear, non-icteric. PERRL    ENT: no obvious abnormality, no ear drainage, mucous membranes moist and pink    Integument/Skin: Right posterior scalp debridement site from his epidermal cyst healing well with small scab. Right neck incision well healed.     Respiratory: Normal respiratory effort.     Cardiovascular: no  cyanosis, no edema    Musculoskeletal: Extremities unremarkable, without edema, tenderness or deformities    Impression:   Galileo Villaseñor  is a 43 year old with a right posterior scalp epidermoid cyst, s/p debridement, now healing    Discussion and Plan:  The patient was counseled on the different treatment options.     He can apply Vaseline to the scab until this falls off.  He will follow-up with me as needed.    25 minutes were spent with the patient, from which 20 minutes were spent counseling the patient and coordinating care.

## 2024-02-09 ENCOUNTER — OFFICE VISIT (OUTPATIENT)
Facility: CLINIC | Age: 44
End: 2024-02-09
Payer: COMMERCIAL

## 2024-02-09 VITALS
DIASTOLIC BLOOD PRESSURE: 60 MMHG | RESPIRATION RATE: 16 BRPM | WEIGHT: 168 LBS | HEIGHT: 66.5 IN | SYSTOLIC BLOOD PRESSURE: 100 MMHG | BODY MASS INDEX: 26.68 KG/M2 | HEART RATE: 56 BPM | OXYGEN SATURATION: 98 %

## 2024-02-09 DIAGNOSIS — R91.8 MULTIPLE PULMONARY NODULES: Primary | ICD-10-CM

## 2024-02-09 PROCEDURE — 3078F DIAST BP <80 MM HG: CPT | Performed by: INTERNAL MEDICINE

## 2024-02-09 PROCEDURE — 3074F SYST BP LT 130 MM HG: CPT | Performed by: INTERNAL MEDICINE

## 2024-02-09 PROCEDURE — 99213 OFFICE O/P EST LOW 20 MIN: CPT | Performed by: INTERNAL MEDICINE

## 2024-02-09 PROCEDURE — 3008F BODY MASS INDEX DOCD: CPT | Performed by: INTERNAL MEDICINE

## 2024-02-09 NOTE — PROGRESS NOTES
[facility] General Pulmonary Progress Note    History of Present Illness:  Galileo Villaseñor is a 43 year old male former smoker (quit 2010, 5 pack years) with significant PMH hydrocephalus s/p VPS who presents today for follow up of lung nodule. He denies new concerns today. No cough, dyspnea, wheeze. No pain. No fevers    February 2023 previously  The patient had previously seen me after having abdominal pain leading to CT abdomen/pelvis showing a RML nodule. He had a subsequent CT chest at [facility] showing the RML 6mm nodule and a 4mm LLL nodule. He presents today for follow up and denies acute concerns. reporrts having had COVID in august 2022 and more recently his daughter in day care was ill and the patient and family became sick thereafter, had lingering cough, used albuterol, now resolved. No fevers. No pain.     Past Medical History:   Past Medical History:   Diagnosis Date    Abdominal pain 01/2020    ALCOHOL USE 2003    Alcoholism on both sides    Allergic rhinitis 2001    Anxiety 10/6/2021    After performing the test asked by dr amezcua i had an anxiety    Anxiety state     Bloating 01/2020    Diarrhea, unspecified 11/01/2022    Enlarged prostate     Flatulence/gas pain/belching 01/2020    Frequent urination 01/2020    Heartburn 01/2020    Hydrocephalus (HCC) 1983    Indigestion 01/2020    Nodule of right lung     S/P  shunt     Seizure (HCC)     Sleep disturbance 01/2020    Stress     Visual impairment     glasses    Wears glasses         Past Surgical History:   Past Surgical History:   Procedure Laterality Date    BRAIN SURGERY  06/13/2023    BRAIN SURGERY  06/20/2023    COLONOSCOPY N/A 06/20/2023    Procedure: COLONOSCOPY with retreival of foreign body;  Surgeon: Anibal Hodges DO;  Location:  ENDOSCOPY    HC IMPLANT SHUNT      HYDROCEPHALUS SHUNTED TWICE    HERNIA SURGERY  1983    OTHER  11/08/2021    VENTRICULOPERITONEAL SHUNT REVISION,  REVISION DISTAL CATHETER ONLY AT RIGHT ANTERIOR  CERVICAL REGION (CAREY), Excision of right neck cyst, scar  ([facility])    OTHER SURGICAL HISTORY  , , 2021    Hydracephalus , shunts placed.  revision    OTHER SURGICAL HISTORY N/A 2023    RIGHT OCCIPITAL SHUNT REMOVAL, LEFT  SHUNT EXTERNALIZATION AND  SHUNT TAP    RECONSTR NOSE  1996    VASECTOMY         Family Medical History:   Family History   Problem Relation Age of Onset    No Known Problems Father     No Known Problems Mother     Cancer Maternal Grandmother         SKIN CANCER    Heart Disease Maternal Grandmother     Alcohol and Other Disorders Associated Maternal Grandfather     Anxiety Maternal Grandfather     Heart Disease Maternal Grandfather     Cancer Paternal Grandmother         SKIN CANCER    Heart Disease Paternal Grandmother     Hypertension Paternal Grandfather     Alcohol and Other Disorders Associated Paternal Grandfather     Stroke Paternal Grandfather     Heart Disease Paternal Grandfather         Social History:   Social History     Socioeconomic History    Marital status:      Spouse name: Not on file    Number of children: Not on file    Years of education: Not on file    Highest education level: Not on file   Occupational History    Not on file   Tobacco Use    Smoking status: Former     Packs/day: 0.50     Years: 4.00     Additional pack years: 0.00     Total pack years: 2.00     Types: Cigarettes     Quit date:      Years since quittin.1    Smokeless tobacco: Never   Vaping Use    Vaping Use: Never used   Substance and Sexual Activity    Alcohol use: Yes     Alcohol/week: 4.0 standard drinks of alcohol     Types: 4 Cans of beer per week    Drug use: No     Comment: smoked cannabis for 4 years passive    Sexual activity: Yes     Partners: Female   Other Topics Concern    Grew up on a farm Not Asked    History of tanning Not Asked    Outdoor occupation Not Asked    Pt has a pacemaker No    Pt has a defibrillator No    Reaction to local anesthetic  No    Caffeine Concern Yes     Comment: coffee in the morning    Exercise No     Comment: limted    Seat Belt Yes    Special Diet No    Stress Concern No    Weight Concern No     Service Not Asked    Blood Transfusions Not Asked    Occupational Exposure Not Asked    Hobby Hazards Not Asked    Sleep Concern Not Asked    Back Care Not Asked    Bike Helmet Not Asked    Self-Exams Not Asked   Social History Narrative    Not on file     Social Determinants of Health     Financial Resource Strain: Not on file   Food Insecurity: Not on file   Transportation Needs: Not on file   Physical Activity: Not on file   Stress: Not on file   Social Connections: Not on file   Housing Stability: Not on file        Medications:   Current Outpatient Medications   Medication Sig Dispense Refill    divalproex  MG Oral Tablet 24 Hr Take 2 tablets (500 mg total) by mouth daily. 180 tablet 1    escitalopram 5 MG Oral Tab Take 1 tablet (5 mg total) by mouth daily.      Multiple Vitamins-Minerals (MULTIVITAMIN ADULT EXTRA C OR) Take 1 tablet by mouth daily.      omega-3 fatty acids 1000 MG Oral Cap Take 1,000 mg by mouth daily.      ALPRAZolam 0.5 MG Oral Tab Take 1 tablet (0.5 mg total) by mouth nightly as needed. (Patient not taking: Reported on 11/1/2023) 90 tablet 1       Review of Systems: Review of Systems   Constitutional: Negative.    HENT: Negative.     Respiratory: Negative.     Cardiovascular: Negative.    Gastrointestinal: Negative.    All other systems reviewed and are negative.       Physical Exam:  /60 (BP Location: Left arm, Patient Position: Sitting, Cuff Size: adult)   Pulse 56   Resp 16   Ht 5' 6.5\" (1.689 m)   Wt 168 lb (76.2 kg)   SpO2 98%   BMI 26.71 kg/m²      Constitutional: alert, cooperative. No acute distress.  HEENT: Head NC/AT.    Cardio: Regular rate and rhythm. Normal S1 and S2. No murmurs.   Respiratory: Thorax symmetrical with no labored breathing. Clear to ausculation bilaterally with  symmetrical breath sounds. No wheezing, rhonchi, rales, or crackles.   GI: NABS. Abd soft, non-tender.  Extremities: No clubbing or cyanosis. No LE edema.    Neurologic: A&Ox3. No gross motor deficits.  Skin: Warm, dry  Psych: Calm, cooperative. Pleasant affect.    Results:  Personally reviewed    WBC: 6.4, done on 6/23/2023.  HGB: 11.2, done on 6/23/2023.  PLT: 183, done on 6/23/2023.     Glucose: 96, done on 6/23/2023.  Cr: 0.66, done on 6/23/2023.  GFR(AA): 128, done on 12/13/2021.  GFR (non-AA): 110, done on 12/13/2021.  CA: 8.3, done on 6/23/2023.  Na: 143, done on 6/23/2023.  K: 3.8, done on 6/23/2023.  Cl: 112, done on 6/23/2023.  CO2: 27, done on 6/23/2023.  Last ALB was 2.7% done on 6/23/2023.     CT CHEST LD NO CAD(CPT=71250)    Result Date: 12/28/2023  CONCLUSION:  Stable chest CT.  Scattered pulmonary nodules again noted largest in the right middle lobe measuring 5 mm.  LOCATION:  [place]   Dictated by (CST): Elmira Jolly MD on 12/28/2023 at 8:41 AM     Finalized by (CST): Elmira Jolly MD on 12/28/2023 at 8:47 AM         Assessment/Plan:  #1. Pulmonary nodules  Found incidentally on CT abd/pelvis on workup for abd pain  12/2021 CT abd/pelvis with RML 6mm nodule  1/31/22 [place] imaging CT with stable RML 6mm nodule and LLL 4mm nodule  12/26/22 [place] imaging CT with stable nodules. No new findings  12/2023 CT chest reviewed with no change  We discussed results in detail and reviewed differential diagnoses. Given the small size, stability over two years and lack of risk factors, these nodules are benign.  There is no need for further follow up      You Valentin MD  2/9/2023

## 2024-02-09 NOTE — PATIENT INSTRUCTIONS
Your CT scan looks good! There is no change in the nodules since 2021.  You do not need any further testing from the pulmonary side of things  Call/message with questions/concerns

## 2024-03-11 ENCOUNTER — TELEPHONE (OUTPATIENT)
Dept: SURGERY | Facility: CLINIC | Age: 44
End: 2024-03-11

## 2024-03-11 ENCOUNTER — PATIENT MESSAGE (OUTPATIENT)
Dept: SURGERY | Facility: CLINIC | Age: 44
End: 2024-03-11

## 2024-03-11 NOTE — TELEPHONE ENCOUNTER
Noted that patient has messaged stating that he has an update for Dr. Good.  Patient included a photo of an epidermoid cyst that is located behind the right ear.     Per Dr. Good at office visit on 7.27.23:    \"ASSESSMENT:  44yo male with a complex shunt history of recent infection and removal of some of his shunt systems and exploratory laparotomy in June 2023     I spoke to the patient and his wife at length about his current symptoms. We discussed concerning signs and symptoms to seek medical attention. We also discussed the importance of establishing psychiatric care, as this appears to be a large contributor to his quality of life current, as he was tearful at multiple points throughout this and prior visits.     Plan:  Follow up MRI scheduled for 9/1/23  Has an appointment for psychiatric care, which I agree with this plan  I will see him back after the repeat MRI, or sooner as needed\"    Routed to TED Alba.

## 2024-03-11 NOTE — TELEPHONE ENCOUNTER
PT called for an appt concerning condition he was seen for last  Scheduled first available appt in June and added to wait list  PT is asking for an earlier appt. - please advise

## 2024-03-11 NOTE — TELEPHONE ENCOUNTER
From: Galileo Villaseñor  To: Viraj Good  Sent: 3/11/2024 2:24 PM CDT  Subject: Possible recurring epidermoid cyst.     Hi Dr. Good,    I apologize for bugging you. However I originally wanted to go the route of just working with Dr. Ackerman and after a recent call back, the nurse said to just bring you in the loop. I sent Dr. Ackerman a picture of a cyst (previously determined non-cancerous) when they did a biopsy at my dermatologist appointment earlier this year. I called Dr. Ackerman’s office and previously couldn’t get in until June. I was called back and it’s now been moved up to April 10. They said it isn’t alarming but that I should let the right people know. Please see the attached picture.     Ahead of my scan, I will definitely be watching this closely. Let me know if you have any questions.     Thanks,    Galileo

## 2024-03-12 ENCOUNTER — TELEPHONE (OUTPATIENT)
Dept: SURGERY | Facility: CLINIC | Age: 44
End: 2024-03-12

## 2024-03-12 NOTE — TELEPHONE ENCOUNTER
Spoke to the patient.  The lesion has recurred inferior to the lesion that was debrided by dermatology a few months ago and previously assessed by Dr. Ackerman. He reports no neurological symptoms.  We discussed that he should let us know if he develops any other symptoms, otherwise, we will plan for the repeat MRI scheduled for next week and see him in clinic shortly thereafter.

## 2024-03-13 NOTE — TELEPHONE ENCOUNTER
Called patient and he already spoke with Dr. Good and he recommended to follow up dermatology. He plans to follow up with Dr. Good on 3/22 after his MRI brain. Closing encounter.

## 2024-03-18 ENCOUNTER — HOSPITAL ENCOUNTER (OUTPATIENT)
Dept: MRI IMAGING | Age: 44
Discharge: HOME | End: 2024-03-18
Attending: NEUROLOGICAL SURGERY
Payer: COMMERCIAL

## 2024-03-18 DIAGNOSIS — G04.90: ICD-10-CM

## 2024-03-18 PROCEDURE — 70553 MRI BRAIN STEM W/O & W/DYE: CPT | Performed by: NEUROLOGICAL SURGERY

## 2024-03-18 PROCEDURE — A9575 INJ GADOTERATE MEGLUMI 0.1ML: HCPCS | Performed by: NEUROLOGICAL SURGERY

## 2024-03-18 RX ORDER — GADOTERATE MEGLUMINE 376.9 MG/ML
20 INJECTION INTRAVENOUS
Status: COMPLETED | OUTPATIENT
Start: 2024-03-18 | End: 2024-03-18

## 2024-03-18 RX ADMIN — GADOTERATE MEGLUMINE 17 ML: 376.9 INJECTION INTRAVENOUS at 08:19:00

## 2024-03-22 ENCOUNTER — OFFICE VISIT (OUTPATIENT)
Dept: SURGERY | Facility: CLINIC | Age: 44
End: 2024-03-22
Payer: COMMERCIAL

## 2024-03-22 VITALS
HEIGHT: 66 IN | SYSTOLIC BLOOD PRESSURE: 120 MMHG | DIASTOLIC BLOOD PRESSURE: 60 MMHG | HEART RATE: 80 BPM | BODY MASS INDEX: 26.52 KG/M2 | WEIGHT: 165 LBS

## 2024-03-22 DIAGNOSIS — R41.3 MEMORY DIFFICULTY: Primary | ICD-10-CM

## 2024-03-22 PROBLEM — K21.9 GASTROESOPHAGEAL REFLUX DISEASE: Status: ACTIVE | Noted: 2024-03-22

## 2024-03-22 PROBLEM — G91.9 HYDROCEPHALUS (HCC): Status: ACTIVE | Noted: 2024-03-22

## 2024-03-22 PROBLEM — J30.9 ALLERGIC RHINITIS: Status: ACTIVE | Noted: 2024-03-22

## 2024-03-22 PROCEDURE — 99215 OFFICE O/P EST HI 40 MIN: CPT | Performed by: NEUROLOGICAL SURGERY

## 2024-03-22 PROCEDURE — 3008F BODY MASS INDEX DOCD: CPT | Performed by: NEUROLOGICAL SURGERY

## 2024-03-22 PROCEDURE — 3074F SYST BP LT 130 MM HG: CPT | Performed by: NEUROLOGICAL SURGERY

## 2024-03-22 PROCEDURE — 3078F DIAST BP <80 MM HG: CPT | Performed by: NEUROLOGICAL SURGERY

## 2024-03-22 NOTE — PATIENT INSTRUCTIONS
Refill policies:    Allow 2-3 business days for refills; controlled substances may take longer.  Contact your pharmacy at least 5 days prior to running out of medication and have them send an electronic request or submit request through the “request refill” option in your [employer] account.  Refills are not addressed on weekends; covering physicians do not authorize routine medications on weekends.  No narcotics or controlled substances are refilled after noon on Fridays or by on call physicians.  By law, narcotics must be electronically prescribed.  A 30 day supply with no refills is the maximum allowed.  If your prescription is due for a refill, you may be due for a follow up appointment.  To best provide you care, patients receiving routine medications need to be seen at least once a year.  Patients receiving narcotic/controlled substance medications need to be seen at least once every 3 months.  In the event that your preferred pharmacy does not have the requested medication in stock (e.g. Backordered), it is your responsibility to find another pharmacy that has the requested medication available.  We will gladly send a new prescription to that pharmacy at your request.    Scheduling Tests:    If your physician has ordered radiology tests such as MRI or CT scans, please contact Central Scheduling at [phone] right away to schedule the test.  Once scheduled, the [facility] Centralized Referral Team will work with your insurance carrier to obtain pre-certification or prior authorization.  Depending on your insurance carrier, approval may take 3-10 days.  It is highly recommended patients assure they have received an authorization before having a test performed.  If test is done without insurance authorization, patient may be responsible for the entire amount billed.      Precertification and Prior Authorizations:  If your physician has recommended that you have a procedure or additional testing performed the [facility]  Centralized Referral Team will contact your insurance carrier to obtain pre-certification or prior authorization.    You are strongly encouraged to contact your insurance carrier to verify that your procedure/test has been approved and is a COVERED benefit.  Although the [facility] Centralized Referral Team does its due diligence, the insurance carrier gives the disclaimer that \"Although the procedure is authorized, this does not guarantee payment.\"    Ultimately the patient is responsible for payment.   Thank you for your understanding in this matter.  Paperwork Completion:  If you require FMLA or disability paperwork for your recovery, please make sure to either drop it off or have it faxed to our office at [phone]. Be sure the form has your name and date of birth on it.  The form will be faxed to our Forms Department and they will complete it for you.  There is a 25$ fee for all forms that need to be filled out.  Please be aware there is a 10-14 day turnaround time.  You will need to sign a release of information (KEATON) form if your paperwork does not come with one.  You may call the Forms Department with any questions at [phone].  Their fax number is [phone].

## 2024-03-22 NOTE — PROGRESS NOTES
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  MRI brain at that time demonstrated stable ventricular size and enhancement of the right sided posterior fossa cyst wall with an indwelling catheter.  We repeated an MRI brain six weeks later which demonstrated reduction of the right posterior fossa cyst with increased enhancement in that area, which ultimately led to the diagnosis of a cerebral/shunt infection due to erosion of a peritoneal catheter into the bowel.  On 6/12/23 he underwent removal of the right occipital cysto-peritoneal shunt and right sub-occipital shunt distal catheter, an exploratory laparotomy, and externalization of the left occipital ventriculoperitoneal shunt, which was subsequently removed on 6/16/23. Since that time, he has completed his antibiotic course.  Post operatively, he had memory difficulty, which has significantly improved with mild residual.  He has intermittent dizziness episodes, including when on long car rides, since surgery. He reports improved vision.  He reports intermittent tinnitus. He notes that he has been doing a lot of physical activity and then feels not right, which initiates an anxiety attack.  He is not currently seeing a therapist or psychiatrist.    Interval History 3/22/24  He and his wife report ongoing memory difficulty, but it has improved and he's returned to work.  Finished Speech therapy in December 2023. MRI brain with and without contrast 3/18/24 demonstrates improved enhancement in the right posterior fossa cyst/lesion and the report notes  slight increased FLAIR signal, although on my review, it appears similar. 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 Ackerman for most recently last month.  Otherwise he has no other symptoms.    PAST MEDICAL HISTORY:  Past Medical History:   Diagnosis Date    Abdominal pain 01/2020    ALCOHOL USE 2003    Alcoholism on both sides    Allergic rhinitis 2001    Anxiety 10/6/2021    After performing the test asked by dr amezcua i had an anxiety    Anxiety state     Bloating 01/2020    Diarrhea, unspecified 11/01/2022    Enlarged prostate     Flatulence/gas pain/belching 01/2020    Frequent urination 01/2020    Heartburn 01/2020    Hydrocephalus (HCC) 1983    Indigestion 01/2020    Nodule of right lung     S/P  shunt     Seizure (HCC)     Sleep disturbance 01/2020    Stress     Visual impairment     glasses    Wears glasses        PAST SURGICAL HISTORY:  Past Surgical History:   Procedure Laterality Date    BRAIN SURGERY  06/13/2023    BRAIN SURGERY  06/20/2023    COLONOSCOPY N/A 06/20/2023    Procedure: COLONOSCOPY with retreival of foreign body;  Surgeon: Anibal Hodges DO;  Location:  ENDOSCOPY    HC IMPLANT SHUNT      HYDROCEPHALUS SHUNTED TWICE    HERNIA SURGERY  1983    OTHER  11/08/2021    VENTRICULOPERITONEAL SHUNT REVISION,  REVISION DISTAL CATHETER ONLY AT RIGHT ANTERIOR CERVICAL REGION (CAREY), Excision of right neck cyst, scar  ([facility])    OTHER SURGICAL HISTORY  1983, 1987, 11/8/2021    Hydracephalus , shunts placed. 11/8 revision    OTHER SURGICAL HISTORY N/A 06/12/2023    RIGHT OCCIPITAL SHUNT REMOVAL, LEFT  SHUNT EXTERNALIZATION AND  SHUNT TAP    RECONSTR NOSE  1996    VASECTOMY  2021       FAMILY HISTORY:  family history includes Alcohol and Other Disorders Associated in his maternal grandfather and paternal grandfather; Anxiety in his maternal grandfather; Cancer in his maternal grandmother and paternal grandmother; Heart Disease in his maternal grandfather, maternal  grandmother, paternal grandfather, and paternal grandmother; Hypertension in his paternal grandfather; No Known Problems in his father and mother; Stroke in his paternal grandfather.    SOCIAL HISTORY:   reports that he quit smoking about 11 years ago. His smoking use included cigarettes. He has a 2 pack-year smoking history. He has never used smokeless tobacco. He reports current alcohol use of about 4.0 standard drinks of alcohol per week. He reports that he does not use drugs.    ALLERGIES:  Allergies   Allergen Reactions    Penicillins HIVES       MEDICATIONS:  Current Outpatient Medications on File Prior to Visit   Medication Sig Dispense Refill    divalproex  MG Oral Tablet 24 Hr Take 2 tablets (500 mg total) by mouth daily. 180 tablet 1    escitalopram 5 MG Oral Tab Take 1 tablet (5 mg total) by mouth daily.      ALPRAZolam 0.5 MG Oral Tab Take 1 tablet (0.5 mg total) by mouth nightly as needed. 90 tablet 1    Multiple Vitamins-Minerals (MULTIVITAMIN ADULT EXTRA C OR) Take 1 tablet by mouth daily.      omega-3 fatty acids 1000 MG Oral Cap Take 1,000 mg by mouth daily.       No current facility-administered medications on file prior to visit.       REVIEW OF SYSTEMS:  A 10-point system was reviewed.  Pertinent positives and negatives are noted in HPI.      PHYSICAL EXAMINATION:  VITAL SIGNS: /60 (BP Location: Right arm, Patient Position: Sitting, Cuff Size: adult)   Pulse 80   Ht 66\"   Wt 165 lb (74.8 kg)   BMI 26.63 kg/m²     A&Ox3, no acute distress  PERRL, EOMi, FS, TM  Full strength x 4, no drift  Sensation intact   Incisions well healed  Right post-auricular granuloma noted    ASSESSMENT:  44yo male with a complex shunt history of recent infection and removal of some of his shunt systems and exploratory laparotomy in June 2023     I reviewed the imaging with the patient and his wife. We discussed the plan for serial imaging to follow the FLAIR signal. We discussed neuropsych testing and  SLP therapy for ongoing memory difficulty. We discussed plan for granuloma. They expressed understanding and agreement with the plan.    Plan:  - Follow up with Dr. Ackerman to re-evaluate right post-auricular granuloma - we will discuss possible definitive surgical excision of any catheter tract calcification that may be inciting this inflammatory process  - Recommend optometry to optimize the right stem of his glasses to not rub on the area of the granuloma  - Neuropsych testing ordered for ongoing memory difficulty  - SLP referral placed for re-evaluation for further optimization of memory difficulty  - Follow up with Dr. Bowens for AED management  - Repeat MRI brain with and without contrast in six months    Viraj Good MD  Neurological Surgery  [facility] Time: 45 min including face to face time, chart review, imaging interpretation, and coordination of care

## 2024-04-10 ENCOUNTER — OFFICE VISIT (OUTPATIENT)
Dept: SURGERY | Facility: CLINIC | Age: 44
End: 2024-04-10
Payer: COMMERCIAL

## 2024-04-10 DIAGNOSIS — L90.5 SCAR OF NECK: Primary | ICD-10-CM

## 2024-04-10 DIAGNOSIS — L92.9 GRANULATION TISSUE: ICD-10-CM

## 2024-04-10 DIAGNOSIS — R56.9 SEIZURES (HCC): ICD-10-CM

## 2024-04-10 PROCEDURE — 99213 OFFICE O/P EST LOW 20 MIN: CPT | Performed by: SURGERY

## 2024-04-10 RX ORDER — DOXYCYCLINE HYCLATE 100 MG
100 TABLET ORAL 2 TIMES DAILY
Qty: 20 TABLET | Refills: 0 | Status: SHIPPED | OUTPATIENT
Start: 2024-04-10 | End: 2024-04-20

## 2024-04-10 NOTE — CONSULTS
Estabilshed Patient Consultation    Chief Complaint: right retroauricular granulation tissue    History of Present Illness:   Galileo Villaseñor is a 43 year old male who returns to the office with recurrent right retroauricular granulation tissue. He previously had this area biopsied with his dermatologist on 1/23/2024. Pathology showed benign skin with polypoid granulation tissue, inflamed. He saw me on 2/7/2024. At that visit, this area had healed with a small scab. He returns today reporting the granulation tissue has returned.     Past Medical History:      Past Medical History:    Abdominal pain    ALCOHOL USE    Alcoholism on both sides    Allergic rhinitis    Anxiety    After performing the test asked by dr amezcua i had an anxiety    Anxiety state    Bloating    Diarrhea, unspecified    Enlarged prostate    Flatulence/gas pain/belching    Frequent urination    Heartburn    Hydrocephalus (HCC)    Indigestion    Nodule of right lung    S/P  shunt    Seizure (HCC)    Sleep disturbance    Stress    Visual impairment    glasses    Wears glasses         Past Surgical History:  Past Surgical History:   Procedure Laterality Date    Brain surgery  06/13/2023    Brain surgery  06/20/2023    Colonoscopy N/A 06/20/2023    Procedure: COLONOSCOPY with retreival of foreign body;  Surgeon: Anibal Hodges DO;  Location:  ENDOSCOPY    Hc implant shunt      HYDROCEPHALUS SHUNTED TWICE    Hernia surgery  1983    Other  11/08/2021    VENTRICULOPERITONEAL SHUNT REVISION,  REVISION DISTAL CATHETER ONLY AT RIGHT ANTERIOR CERVICAL REGION (CAREY), Excision of right neck cyst, scar  ([place])    Other surgical history  1983, 1987, 11/8/2021    Hydracephalus , shunts placed. 11/8 revision    Other surgical history N/A 06/12/2023    RIGHT OCCIPITAL SHUNT REMOVAL, LEFT  SHUNT EXTERNALIZATION AND  SHUNT TAP    Reconstr nose  1996    Vasectomy  2021         Medications:    No outpatient medications have been marked as taking for  the 4/10/24 encounter (Appointment) with Fanny Ackerman MD.         Allergies:    Allergies   Allergen Reactions    Penicillins HIVES         Family History:   Family History   Problem Relation Age of Onset    No Known Problems Father     No Known Problems Mother     Cancer Maternal Grandmother         SKIN CANCER    Heart Disease Maternal Grandmother     Alcohol and Other Disorders Associated Maternal Grandfather     Anxiety Maternal Grandfather     Heart Disease Maternal Grandfather     Cancer Paternal Grandmother         SKIN CANCER    Heart Disease Paternal Grandmother     Hypertension Paternal Grandfather     Alcohol and Other Disorders Associated Paternal Grandfather     Stroke Paternal Grandfather     Heart Disease Paternal Grandfather          Social History:  History   Alcohol Use    4.0 standard drinks of alcohol/week    4 Cans of beer per week       History   Smoking Status    Former    Packs/day: 0.50    Years: 4.00    Types: Cigarettes    Quit date: 2013   Smokeless Tobacco    Never       History   Drug Use No     Comment: smoked cannabis for 4 years passive         Review of Systems:    The patient reports: see HPI  All other systems are unremarkable.      Physical Exam:    There were no vitals taken for this visit.    Constitutional: The patient is an alert, oriented and well-developed.     Neurologic: Speech patterns and movements are normal.     Psychiatric: Affect is appropriate.    Eyes: Conjunctiva are clear, non-icteric. PERRL    ENT: no obvious abnormality, no ear drainage, mucous membranes moist and pink    Integument/Skin: right retroauricular area with 2 areas of granulation tissue; right neck incision well healed    Respiratory: Normal respiratory effort.     Cardiovascular: no cyanosis, no edema    Musculoskeletal: Extremities unremarkable, without edema, tenderness or deformities    Impression:   Galileo Villaseñor  is a 43 year old with right retroauricular granulation  tissue    Discussion and Plan:  The patient was counseled on the different treatment options.     Patient met with Dr. Good on 3/22/2024 who discussed the possibility for surgical excision of the granulation tissue and any catheter tract calcification that may be inciting an inflammatory process. We discussed this today as well. He is still thinking about his options. A prescription for doxycycline was sent to his pharmacy. He will follow up if he decides to have any surgical intervention.    25 minutes were spent with the patient, from which 20 minutes were spent counseling the patient and coordinating care.

## 2024-04-11 RX ORDER — DIVALPROEX SODIUM 250 MG/1
500 TABLET, EXTENDED RELEASE ORAL DAILY
Qty: 180 TABLET | Refills: 0 | Status: SHIPPED | OUTPATIENT
Start: 2024-04-11

## 2024-04-11 NOTE — TELEPHONE ENCOUNTER
Medication: DIVALPROEX  MG Oral Tablet 24 Hr     Date of last refill: 11/01/2023 (#180/1)  [place]     Last office visit: 11/01/2023  Due back to clinic per last office note:  Around 05/01/2024  Date next office visit scheduled:    Future Appointments   Date Time Provider Department Center   4/17/2024  9:00 AM Su Nielson [facility]   4/19/2024  8:00 AM Savita Vela, LCSW [facility]   4/25/2024  3:30 PM Su [facility]son [facility]   4/29/2024  5:15 PM Su Nielson [facility]   5/1/2024  2:00 PM Su Nielson [facility]   5/3/2024  8:00 AM Savita Vela LCSW [facility]   5/7/2024 12:15 PM Su Nielson [facility]   5/9/2024 12:15 PM Su Nielson [facility]   5/16/2024  2:40 PM Dewye Bowens DO [id number]   5/23/2024  9:00 AM Savita Vela, LCSW [facility]   6/6/2024  8:00 AM Savita Vela, LCSW [facility]   9/23/2024  7:45 AM PF MRI RM1 (1.5T) PF [facility] [place]   9/26/2024  8:00 AM Viraj Good MD ENINAPER2 [facility] [place]           Last OV note recommendation:    Assessment:  This is a 42 y/o male with a h/o a congential hydrocephalus s/p shunting and revision and seizures.  I will continue Depakote 500mg ER Qday for now. I will repeat the EEG in April of 2024 which will be 1 year after his last seizures. If it looks ok then we can consider tapering off his depakote        Plan:  1. Seizures  - CTH and OSH records reviewed  - EEG reports reviewed  - EEG in 8/2023 was normal  - Continue Depakote 500mg ER Qday              RTC in 6 months     Dewey Bowens DO  Neurology

## 2024-04-15 ENCOUNTER — TELEPHONE (OUTPATIENT)
Dept: PHYSICAL THERAPY | Facility: HOSPITAL | Age: 44
End: 2024-04-15

## 2024-04-17 ENCOUNTER — OFFICE VISIT (OUTPATIENT)
Dept: SPEECH THERAPY | Facility: HOSPITAL | Age: 44
End: 2024-04-17
Attending: NEUROLOGICAL SURGERY
Payer: COMMERCIAL

## 2024-04-17 DIAGNOSIS — R41.3 MEMORY DIFFICULTY: Primary | ICD-10-CM

## 2024-04-17 PROCEDURE — 92523 SPEECH SOUND LANG COMPREHEN: CPT

## 2024-04-17 NOTE — PROGRESS NOTES
ADULT SPEECH/LANGUAGE EVALUATION:     Diagnosis:   Memory difficulty [R41.3]      Referring Provider: Viraj Good  Date of Evaluation:    4/17/2024    Precautions:   Cognition Next MD visit:   none scheduled  Date of Surgery: n/a     Cognitive-communication evaluation completed per MD order. Patient arrived on time. Patient participated actively in assessment tasks.     PATIENT SUMMARY   Galileo Villaseñor is a 43 year old male who presents to therapy today with complaints of cognitive communication deficits. Patient has h/o hydrocephalus and cerebral cysts and has had placement of shunts. Patient is known to this therapy clinic as he previously participated in outpatient speech therapy services targeting cognitive linguistic skills from 8/2023 to 12/2023. Patient demonstrated significant improvement in memory, reasoning, problem solving, attention, and executive functioning at that time. Patient currently endorses residual deficits within short term/working memory which interfere with his ability to complete new work-related tasks and tasks within the home. Patient presents to [facility] for evaluation of cognitive-communication.  Incident/Injury:  shunt removal 6/2023  Pain: No pain reported on this date. Patient not being seen for pain.   Hospital/Rehab course: IP speech therapy (June 2023; swallow evaluation); Home Health Speech Therapy (June 2023 to August 2023); Outpatient rehabilitation (August 2023 to December 2023)  Current functional limitations include difficulty completing tasks within the home and at work.   Galileo describes prior level of function as resolving symptoms with continued short term/working memory deficits. Pt goals include improving cognition and reviewing previously learned strategies/supports.  Past medical history was reviewed with Galileo. Significant findings include   Past Medical History:    Abdominal pain    ALCOHOL USE     Alcoholism on both sides    Allergic rhinitis    Anxiety    After performing the test asked by dr amezcua i had an anxiety    Anxiety state    Bloating    Diarrhea, unspecified    Enlarged prostate    Flatulence/gas pain/belching    Frequent urination    Heartburn    Hydrocephalus (HCC)    Indigestion    Nodule of right lung    S/P  shunt    Seizure (HCC)    Sleep disturbance    Stress    Visual impairment    glasses    Wears glasses     Medications:   Current Outpatient Medications on File Prior to Visit   Medication Sig Dispense Refill    divalproex  MG Oral Tablet 24 Hr Take 2 tablets (500 mg total) by mouth daily. 180 tablet 0    Doxycycline Hyclate 100 MG Oral Tab Take 1 tablet (100 mg total) by mouth 2 (two) times daily for 10 days. 20 tablet 0    escitalopram 5 MG Oral Tab Take 1 tablet (5 mg total) by mouth daily.      ALPRAZolam 0.5 MG Oral Tab Take 1 tablet (0.5 mg total) by mouth nightly as needed. 90 tablet 1    Multiple Vitamins-Minerals (MULTIVITAMIN ADULT EXTRA C OR) Take 1 tablet by mouth daily.      omega-3 fatty acids 1000 MG Oral Cap Take 1,000 mg by mouth daily.       Current Facility-Administered Medications on File Prior to Visit   Medication Dose Route Frequency Provider Last Rate Last Admin    [COMPLETED] gadoterate meglumine (Dotarem) 10 MMOL/20ML injection 20 mL  20 mL Intravenous ONCE PRN Viraj Good MD   17 mL at 03/18/24 0819     ASSESSMENT  Galileo presents to speech therapy evaluation with primary c/o cognitive deficits. The results of the objective tests and measures show deficits within the following areas: memory (working, recall), attention, and executive functioning. Functional deficits include but are not limited to difficulty completing work related tasks and follow through with home tasks. Further evaluation required to determine severity of deficits within the above areas. Signs and symptoms are consistent with diagnosis of cognitive-communication deficit. Pt and SLP  discussed evaluation findings, pathology, POC and HEP.  Pt voiced understanding and performs HEP correctly. Skilled Speech Therapy is medically necessary to address the above impairments and reach functional goals.     OBJECTIVE:   Assessment tools used: Cognitive-Linguistic Quick Test (CLQT), Cognitive Function Short Form, Subsections of the [place] Behavioural Memory Test (RBMT-3) - further trials deferred d/t time constraints and remainder of RBMT to be completed in next session.     COGNITIVE-COMMUNICATIVE SKILLS  Severity: Mild  Deficits: Attention, Memory, and Executive function  Impact on communication: Patient demonstrates difficulty communicating effectively within higher level cognitive tasks and environments (e.g., work and home).    Cognitive Linguistic Quick Test (CLQT): Administered the CLQT to assess cognitive functions in the following domains: attention, memory, executive functions, language, visuospatial skills, and clock drawing.     CLQT scores are as follows: Attention: 191 (EUQ=792-480); Memory: 178 (IUI=384-109); Executive Functions 27 (WNL=24-40); Language: 35 (WNL=29-37); Visuospatial Skills: 88 (WNL=); Clock Drawin (WNL=12-13).  Composite Cognitive Score 4.0 (WNL 3.5-4.0).    Subtests:  Personal Facts= 8 (average=8)  Symbol Cancellations= 12 (average=11)  Confrontation Naming= 10 (average=10)  Clock Drawing= 13 (average=12)  Story Retelling= 9 (average=6)  Symbol Trails= 5 (average=9)  Generative Naming= 8 (average=5)  Design Memory= 6 (average=5)  Mazes= 8 (average=7)  Design Generation= 6 (average=6)    Cognitive Function Short Form 8a: This patient reported outcome measure is utilized to determine patients' perceptions of cognitive function and impact on daily life activities.   Score: 32/40 (patient scored \"sometimes/2-3 times a week\" for 1/8 questions, \"rarely/once a week\" for 6/8 questions, \"never\" for 1/8 questions)  A lower score indicates more severe perception of  cognitive function.       Today's Treatment:  Pt education was provided on exam findings, treatment diagnosis, treatment plan, expectations, and prognosis. Pt was also provided recommendations for further evaluation for higher level tasks.      Charges: Soraya x1, [id number]      Total Timed Treatment: 45 min     Total Treatment Time: 45 min     PLAN OF CARE:    Goals: (to be met in 12 visits)   STG 1: Patient will independently verbalize compensatory memory strategies (processing, working memory, short-term memory, attention, problem-solving) and describe 1 use per strategy.  STG 2: Patient will recall novel paragraph length stimuli with 90% accuracy following a 20 minute delay, with min verbal and visual cues for use of compensatory memory strategies.  STG 3: Patient will accurately complete working memory tasks within 90% of opportunities given min verbal and visual cueing.  STG 4: Patient will utilize accurate planning and note-taking skills within 90% of opportunities given min verbal and visual cueing.  STG 5: Patient will divide attention between two higher level complexity tasks given less than 2 verbal redirections/reminders.       Frequency / Duration: Patient will be seen for 1x/week or a total of 12 visits over a 90 day period. Treatment will include: Speech Therapy    Education or treatment limitation: None  Rehab Potential:good given compliance with HEP and consistent attendance       Patient/Family/Caregiver was advised of these findings, precautions, and treatment options and has agreed to actively participate in planning and for this course of care.    Thank you for your referral. Please co-sign or sign and return this letter via fax as soon as possible to [phone]. If you have any questions, please contact me at Dept: [phone]    Sincerely,  Electronically signed by therapist: YONG Johnson  Physician's certification required: Yes  I certify the need for these services furnished under this  plan of treatment and while under my care.    X___________________________________________________ Date____________________    Certification From: 4/17/2024  To:7/16/2024

## 2024-04-25 ENCOUNTER — OFFICE VISIT (OUTPATIENT)
Dept: SPEECH THERAPY | Facility: HOSPITAL | Age: 44
End: 2024-04-25
Attending: NEUROLOGICAL SURGERY
Payer: COMMERCIAL

## 2024-04-25 ENCOUNTER — DOCUMENTATION ONLY (OUTPATIENT)
Dept: SURGERY | Facility: CLINIC | Age: 44
End: 2024-04-25

## 2024-04-25 PROCEDURE — 92507 TX SP LANG VOICE COMM INDIV: CPT

## 2024-04-25 NOTE — PROGRESS NOTES
Diagnosis:   Memory difficulty [R41.3]      Referring Provider: Viraj Good  Date of Evaluation:   4/17/2024    Precautions:   Cognition Next MD visit:   none scheduled  Date of Surgery: n/a   Insurance Primary/Secondary: BCBS OUT OF STATE / N/A       # Auth Visits: 12   Total Timed Treatment: 45 min  Date POC Expires: 7/16/2024  Total Treatment time: 45 min  Charges: [id number]   Treatment Number: 2    Subjective: Patient arrived on time to session. Patient participated actively in therapeutic tasks.     Pain: No pain reported on this date. Patient not being seen for pain.    Objective:  Goals: (to be met in 12 visits)   STG 1: Patient will independently verbalize compensatory memory strategies (processing, working memory, short-term memory, attention, problem-solving) and describe 1 use per strategy.  Progress: Discussed use of memory strategies. Patient demonstrated need for external strategies through additional informal measures.     STG 2: Patient will recall novel paragraph length stimuli with 90% accuracy following a 20 minute delay, with min verbal and visual cues for use of compensatory memory strategies.  Progress: 70% independently. Increased to 80% given mod verbal cueing.     STG 3: Patient will accurately complete working memory tasks within 90% of opportunities given min verbal and visual cueing.  Progress: Goal not targeted on this date d/t focus on other goal areas.     STG 4: Patient will utilize accurate planning and note-taking skills within 90% of opportunities given min verbal and visual cueing.  Progress: Dicussed note-taking. Patient reports continued use of notes for work.    STG 5: Patient will divide attention between two higher level complexity tasks given less than 2 verbal redirections/reminders.   Progress: Required average of 4 verbal redirections/reminders    HEP: External memory strategies  Education: Evaluation findings    Assessment: Patient presents with cognitive deficits  within the following areas: memory (working, recall), attention, and executive functioning. Patient's deficits impact his ability to transition to work tasks of increased complexity and follow through with home tasks. Patient continues to demonstrate difficulty attending to multiple higher level complex tasks and requires increased processing time. Patient benefits from verbal and visual cueing to support use of external cognitive supports. Patient benefits from verbal prompts and tolerates some fading of cues to increased independence. Further skilled intervention is medically necessary.       Plan: Continue speech-language therapy targeting cognitive-communication.

## 2024-04-25 NOTE — PATIENT INSTRUCTIONS
Surgeon:              Dr. Fanny Ackerman, PhD                                          Tel:         [phone]                                  Fax:        [phone]      Surgery/Procedure: Excision of skin lesion, granuloma right retroauricular scalp, wound closure with local tissue rearrangement  1.5 hours total, joint with Dr. Good, general anesthesia vs IV sedation, outpatient, needs preop visit with Dr. Ackerman    Dx Code:  [L92.9]     Hospital:  [facility]: [address] [place] [address]           [phone]  [facility]: [address], [place] [address]               [phone]    1. Someone will need to drive you to and from the hospital if your procedure is outpatient.    2.Do not drink alcohol or smoke 24 hours prior to your procedure.    3. Bring a picture ID and your insurance card.    4. You will be contacted by the hospital the day before to confirm the procedure time and location.     5. Do not take any herbal supplements or blood thinners at least one week before your procedure/surgery. This includes NSAID's (aspirin, baby aspirin, Motrin, Ibuprofen, Aleve, Advil, Naproxen, etc), Plavix, fish oil, vitamin E, turmeric, CoQ10, or green tea supplements, etc. *TYLENOL or acetaminophen is ok to take*    6. PRE-OPERATIVE TESTING: History and physical with medical clearance is REQUIRED within 30 days of the surgery date and is mandatory per Dr. Ackerman. *If this is not done, your surgery will be postponed*  MEDICAL CLEARANCE WITH DR. Beatty  CBC  CMP  EKG (within 90 days)  Neurology clearance with Dr. Bowens  Needs preop visit with Dr. Ackerman    7. Please inform us if you start or change any medications at least one week before surgery (ex: blood thinners, weight loss medications, diabetic medications, herbal supplements, etc)    8. Does patient have diagnosis of sleep apnea?    [   ] Yes     [ X  ]  No    Need photos and consents

## 2024-04-29 ENCOUNTER — TELEPHONE (OUTPATIENT)
Dept: SURGERY | Facility: CLINIC | Age: 44
End: 2024-04-29

## 2024-04-29 ENCOUNTER — OFFICE VISIT (OUTPATIENT)
Dept: SPEECH THERAPY | Facility: HOSPITAL | Age: 44
End: 2024-04-29
Attending: NEUROLOGICAL SURGERY
Payer: COMMERCIAL

## 2024-04-29 PROCEDURE — 92507 TX SP LANG VOICE COMM INDIV: CPT

## 2024-04-29 NOTE — TELEPHONE ENCOUNTER
Dr Ackerman office called to schedule a dual surgery with Dr Good.  LOV note on 3.22.24 states,    \"Plan:  - Follow up with Dr. Ackerman to re-evaluate right post-auricular granuloma - we will discuss possible definitive surgical excision of any catheter tract calcification that may be inciting this inflammatory process\"    Pls advise on next steps.

## 2024-04-29 NOTE — TELEPHONE ENCOUNTER
Spoke with Soniya at Dr. Good's office to schedule a joint case with him and Dr. Ackerman. He is out of the office until 5/13/24 and cannot schedule until she has more information from him. She will reach out to him and will call me back to schedule.

## 2024-04-29 NOTE — PROGRESS NOTES
Diagnosis:   Memory difficulty [R41.3]      Referring Provider: Viraj Good  Date of Evaluation:   4/17/2024    Precautions:   Cognition Next MD visit:   none scheduled  Date of Surgery: n/a   Insurance Primary/Secondary: BCBS OUT OF STATE / N/A       # Auth Visits: 12   Total Timed Treatment: 45 min  Date POC Expires: 7/16/2024  Total Treatment time: 45 min  Charges: [id number]   Treatment Number: 3    Subjective: Patient arrived on time to session. Patient participated actively in therapeutic tasks.     Pain: No pain reported on this date. Patient not being seen for pain.    Objective:  Goals: (to be met in 12 visits)   STG 1: Patient will independently verbalize compensatory memory strategies (processing, working memory, short-term memory, attention, problem-solving) and describe 1 use per strategy.  Progress: Patient able to teach back use of note-taking strategies given mod verbal cueing.     STG 2: Patient will recall novel paragraph length stimuli with 90% accuracy following a 20 minute delay, with min verbal and visual cues for use of compensatory memory strategies.  Progress: 80% given mod verbal and visual cueing.     STG 3: Patient will accurately complete working memory tasks within 90% of opportunities given min verbal and visual cueing.  Progress: 80% given mod verbal and visual cueing    STG 4: Patient will utilize accurate planning and note-taking skills within 90% of opportunities given min verbal and visual cueing.  Progress: Utilized note-taking required mod verbal and visual cues    STG 5: Patient will divide attention between two higher level complexity tasks given less than 2 verbal redirections/reminders.   Progress: Required average of 4 verbal redirections/reminders    HEP: External memory strategies  Education: Evaluation findings    Assessment: Patient presents with cognitive deficits within the following areas: memory (working, recall), attention, and executive functioning. Patient's  deficits impact his ability to transition to work tasks of increased complexity and follow through with home tasks. Patient continues to require skilled cueing to support recall of novel paragraph length information. Patient benefited from preparatory sets and verbal cueing/multiple choice. Patient increased accuracy given opportunities for practice and skilled cueing. Continued therapeutic intervention is medically necessary.       Plan: Continue speech-language therapy targeting cognitive-communication.

## 2024-05-01 ENCOUNTER — OFFICE VISIT (OUTPATIENT)
Dept: SPEECH THERAPY | Facility: HOSPITAL | Age: 44
End: 2024-05-01
Attending: NEUROLOGICAL SURGERY
Payer: COMMERCIAL

## 2024-05-01 PROCEDURE — 92507 TX SP LANG VOICE COMM INDIV: CPT

## 2024-05-01 NOTE — PROGRESS NOTES
Diagnosis:   Memory difficulty [R41.3]      Referring Provider: Viraj Good  Date of Evaluation:   4/17/2024    Precautions:   Cognition Next MD visit:   none scheduled  Date of Surgery: n/a   Insurance Primary/Secondary: BCBS OUT OF STATE / N/A       # Auth Visits: 12   Total Timed Treatment: 45 min  Date POC Expires: 7/16/2024  Total Treatment time: 45 min  Charges: [id number]   Treatment Number: 4    Subjective: Patient arrived early to session. Patient participated actively in therapeutic tasks.     Pain: No pain reported on this date. Patient not being seen for pain.    Objective:  Goals: (to be met in 12 visits)   STG 1: Patient will independently verbalize compensatory memory strategies (processing, working memory, short-term memory, attention, problem-solving) and describe 1 use per strategy.  Progress: Verbalized use of note-taking and task segmentation strategies. Patient able to generate opportunities for use given mod cueing.     STG 2: Patient will recall novel paragraph length stimuli with 90% accuracy following a 20 minute delay, with min verbal and visual cues for use of compensatory memory strategies.  Progress: 80% given mod verbal and visual cueing.     STG 3: Patient will accurately complete working memory tasks within 90% of opportunities given min verbal and visual cueing.  Progress: 80% given mod verbal and visual cueing    STG 4: Patient will utilize accurate planning and note-taking skills within 90% of opportunities given min verbal and visual cueing.  Progress: Discussed planning and task segmentation for upcoming events given mod verbal and visual cues.    STG 5: Patient will divide attention between two higher level complexity tasks given less than 2 verbal redirections/reminders.   Progress: Required average of 4 verbal redirections/reminders    HEP: External memory strategies  Education: Evaluation findings    Assessment: Patient presents with cognitive deficits within the following  areas: memory (working, recall), attention, and executive functioning. Patient's deficits impact his ability to transition to work tasks of increased complexity and follow through with home tasks. Patient demonstrated increased problem solving for realistic situations within daily life tasks. Patient continues to require support to engage use of compensatory strategies. Patient will benefit from continued training and structured practice given skilled cueing. Further intervention is medically necessary.       Plan: Continue speech-language therapy targeting cognitive-communication.

## 2024-05-07 ENCOUNTER — OFFICE VISIT (OUTPATIENT)
Dept: SPEECH THERAPY | Facility: HOSPITAL | Age: 44
End: 2024-05-07
Attending: NEUROLOGICAL SURGERY
Payer: COMMERCIAL

## 2024-05-07 PROCEDURE — 92507 TX SP LANG VOICE COMM INDIV: CPT

## 2024-05-07 NOTE — PROGRESS NOTES
Diagnosis:   Memory difficulty [R41.3]      Referring Provider: Viraj Good  Date of Evaluation:   4/17/2024    Precautions:   Cognition Next MD visit:   none scheduled  Date of Surgery: n/a   Insurance Primary/Secondary: BCBS OUT OF STATE / N/A       # Auth Visits: 12   Total Timed Treatment: 45 min  Date POC Expires: 7/16/2024  Total Treatment time: 45 min  Charges: [id number]   Treatment Number: 5    Subjective: Patient arrived early to session. Patient participated actively in therapeutic tasks.     Pain: No pain reported on this date. Patient not being seen for pain.    Objective:  Goals: (to be met in 12 visits)   STG 1: Patient will independently verbalize compensatory memory strategies (processing, working memory, short-term memory, attention, problem-solving) and describe 1 use per strategy.  Progress: Utilized strategies within working memory and recall tasks given min cueing. Strategies proved to be beneficial for working memory tasks, however patient required further encoding of devices utilized as he experienced decreased effectiveness within delayed recall tasks.     STG 2: Patient will recall novel paragraph length stimuli with 90% accuracy following a 20 minute delay, with min verbal and visual cues for use of compensatory memory strategies.  Progress: 85% given mod verbal and visual cueing following 10 minute delay.     STG 3: Patient will accurately complete working memory tasks within 90% of opportunities given min verbal and visual cueing.  Progress: 85% given mod verbal and visual cueing    STG 4: Patient will utilize accurate planning and note-taking skills within 90% of opportunities given min verbal and visual cueing.  Progress: Patient utilizes calendar reminders for planning tasks. Planning/note-taking not targeted during this session d/t focus on working memory tasks.     STG 5: Patient will divide attention between two higher level complexity tasks given less than 2 verbal  redirections/reminders.   Progress: Required average of 3 verbal redirections/reminders    HEP: External memory strategies  Education: Evaluation findings    Assessment: Patient presents with cognitive deficits within the following areas: memory (working, recall), attention, and executive functioning. Patient's deficits impact his ability to transition to work tasks of increased complexity and follow through with home tasks. Patient demonstrates receptiveness of skilled cueing and direct instruction regarding use of internal and external memory strategies/supports. Patient continues to demonstrate need for skilled intervention to support continued training and carryover of skills. Continued therapeutic intervention is medically necessary.       Plan: Continue speech-language therapy targeting cognitive-communication.

## 2024-05-09 ENCOUNTER — OFFICE VISIT (OUTPATIENT)
Dept: SPEECH THERAPY | Facility: HOSPITAL | Age: 44
End: 2024-05-09
Attending: NEUROLOGICAL SURGERY
Payer: COMMERCIAL

## 2024-05-09 PROCEDURE — 92507 TX SP LANG VOICE COMM INDIV: CPT

## 2024-05-09 NOTE — PROGRESS NOTES
Diagnosis:   Memory difficulty [R41.3]      Referring Provider: Viraj Good  Date of Evaluation:   4/17/2024    Precautions:   Cognition Next MD visit:   none scheduled  Date of Surgery: n/a   Insurance Primary/Secondary: BCBS OUT OF STATE / N/A       # Auth Visits: 12   Total Timed Treatment: 45 min  Date POC Expires: 7/16/2024  Total Treatment time: 45 min  Charges: [id number]   Treatment Number: 6    Subjective: Patient arrived early to session. Patient participated actively in therapeutic tasks.     Pain: No pain reported on this date. Patient not being seen for pain.    Objective:  Goals: (to be met in 12 visits)   STG 1: Patient will independently verbalize compensatory memory strategies (processing, working memory, short-term memory, attention, problem-solving) and describe 1 use per strategy.  Progress: Patient verbalized use for task segmentation and repetition strategies within daily life given min verbal cues.     STG 2: Patient will recall novel paragraph length stimuli with 90% accuracy following a 20 minute delay, with min verbal and visual cues for use of compensatory memory strategies.  Progress: 85% given mod verbal and visual cueing following 10 minute delay.     STG 3: Patient will accurately complete working memory tasks within 90% of opportunities given min verbal and visual cueing.  Progress: 85% given min verbal and visual cueing    STG 4: Patient will utilize accurate planning and note-taking skills within 90% of opportunities given min verbal and visual cueing.  Progress: Patient benefited from cueing to utilize note-taking within recall task.     STG 5: Patient will divide attention between two higher level complexity tasks given less than 2 verbal redirections/reminders.   Progress: Required average of 2 verbal redirections/reminders. Goal met; to gauge carryover in next session.    HEP: External memory strategies  Education: Evaluation findings    Assessment: Patient presents with  cognitive deficits within the following areas: memory (working, recall), attention, and executive functioning. Patient's deficits impact his ability to transition to work tasks of increased complexity and follow through with home tasks. Patient demonstrates receptiveness of skilled cueing and direct instruction regarding use of internal and external memory strategies/supports. Patient continues to demonstrate need for skilled intervention to support continued training and carryover of skills. Continued therapeutic intervention is medically necessary.       Plan: Continue speech-language therapy targeting cognitive-communication.

## 2024-05-13 ENCOUNTER — TELEPHONE (OUTPATIENT)
Dept: SURGERY | Facility: CLINIC | Age: 44
End: 2024-05-13

## 2024-05-13 ENCOUNTER — TELEPHONE (OUTPATIENT)
Dept: SPEECH THERAPY | Facility: HOSPITAL | Age: 44
End: 2024-05-13

## 2024-05-13 DIAGNOSIS — L92.9 GRANULATION TISSUE: Primary | ICD-10-CM

## 2024-05-13 NOTE — TELEPHONE ENCOUNTER
Calling pt in regards to scheduling surgery.  Informed pt that I have 5/28/24 available at [facility] with Dr. Ackerman.  Pt verbalized understanding and in agreement with date and location.  All questions answered.   Encouraged pt to call or [facility] message office with any other questions or concerns.

## 2024-05-14 ENCOUNTER — OFFICE VISIT (OUTPATIENT)
Dept: FAMILY MEDICINE CLINIC | Facility: CLINIC | Age: 44
End: 2024-05-14

## 2024-05-14 ENCOUNTER — LAB ENCOUNTER (OUTPATIENT)
Dept: LAB | Age: 44
End: 2024-05-14
Attending: FAMILY MEDICINE

## 2024-05-14 VITALS
WEIGHT: 172 LBS | OXYGEN SATURATION: 97 % | RESPIRATION RATE: 16 BRPM | SYSTOLIC BLOOD PRESSURE: 92 MMHG | DIASTOLIC BLOOD PRESSURE: 62 MMHG | TEMPERATURE: 98 F | BODY MASS INDEX: 27.64 KG/M2 | HEART RATE: 87 BPM | HEIGHT: 66 IN

## 2024-05-14 DIAGNOSIS — Z01.818 PREOP EXAMINATION: ICD-10-CM

## 2024-05-14 DIAGNOSIS — Z01.818 PREOP EXAMINATION: Primary | ICD-10-CM

## 2024-05-14 LAB
ALBUMIN SERPL-MCNC: 3.9 G/DL (ref 3.4–5)
ALBUMIN/GLOB SERPL: 1.3 {RATIO} (ref 1–2)
ALP LIVER SERPL-CCNC: 44 U/L
ALT SERPL-CCNC: 24 U/L
ANION GAP SERPL CALC-SCNC: 6 MMOL/L (ref 0–18)
AST SERPL-CCNC: 25 U/L (ref 15–37)
ATRIAL RATE: 55 BPM
BASOPHILS # BLD AUTO: 0.08 X10(3) UL (ref 0–0.2)
BASOPHILS NFR BLD AUTO: 1.3 %
BILIRUB SERPL-MCNC: 0.8 MG/DL (ref 0.1–2)
BUN BLD-MCNC: 13 MG/DL (ref 9–23)
CALCIUM BLD-MCNC: 9.3 MG/DL (ref 8.5–10.1)
CHLORIDE SERPL-SCNC: 107 MMOL/L (ref 98–112)
CO2 SERPL-SCNC: 26 MMOL/L (ref 21–32)
CREAT BLD-MCNC: 0.89 MG/DL
EGFRCR SERPLBLD CKD-EPI 2021: 110 ML/MIN/1.73M2 (ref 60–?)
EOSINOPHIL # BLD AUTO: 0.06 X10(3) UL (ref 0–0.7)
EOSINOPHIL NFR BLD AUTO: 1 %
ERYTHROCYTE [DISTWIDTH] IN BLOOD BY AUTOMATED COUNT: 12.3 %
FASTING STATUS PATIENT QL REPORTED: YES
GLOBULIN PLAS-MCNC: 3.1 G/DL (ref 2.8–4.4)
GLUCOSE BLD-MCNC: 86 MG/DL (ref 70–99)
HCT VFR BLD AUTO: 44.1 %
HGB BLD-MCNC: 14.8 G/DL
IMM GRANULOCYTES # BLD AUTO: 0.01 X10(3) UL (ref 0–1)
IMM GRANULOCYTES NFR BLD: 0.2 %
LYMPHOCYTES # BLD AUTO: 2.61 X10(3) UL (ref 1–4)
LYMPHOCYTES NFR BLD AUTO: 42.9 %
MCH RBC QN AUTO: 30.5 PG (ref 26–34)
MCHC RBC AUTO-ENTMCNC: 33.6 G/DL (ref 31–37)
MCV RBC AUTO: 90.7 FL
MONOCYTES # BLD AUTO: 0.69 X10(3) UL (ref 0.1–1)
MONOCYTES NFR BLD AUTO: 11.3 %
NEUTROPHILS # BLD AUTO: 2.64 X10 (3) UL (ref 1.5–7.7)
NEUTROPHILS # BLD AUTO: 2.64 X10(3) UL (ref 1.5–7.7)
NEUTROPHILS NFR BLD AUTO: 43.3 %
OSMOLALITY SERPL CALC.SUM OF ELEC: 287 MOSM/KG (ref 275–295)
P AXIS: 51 DEGREES
P-R INTERVAL: 134 MS
PLATELET # BLD AUTO: 198 10(3)UL (ref 150–450)
POTASSIUM SERPL-SCNC: 4.2 MMOL/L (ref 3.5–5.1)
PROT SERPL-MCNC: 7 G/DL (ref 6.4–8.2)
Q-T INTERVAL: 414 MS
QRS DURATION: 96 MS
QTC CALCULATION (BEZET): 396 MS
R AXIS: 68 DEGREES
RBC # BLD AUTO: 4.86 X10(6)UL
SODIUM SERPL-SCNC: 139 MMOL/L (ref 136–145)
T AXIS: 49 DEGREES
VENTRICULAR RATE: 55 BPM
WBC # BLD AUTO: 6.1 X10(3) UL (ref 4–11)

## 2024-05-14 PROCEDURE — 80053 COMPREHEN METABOLIC PANEL: CPT | Performed by: FAMILY MEDICINE

## 2024-05-14 PROCEDURE — 3074F SYST BP LT 130 MM HG: CPT | Performed by: FAMILY MEDICINE

## 2024-05-14 PROCEDURE — 3008F BODY MASS INDEX DOCD: CPT | Performed by: FAMILY MEDICINE

## 2024-05-14 PROCEDURE — 93000 ELECTROCARDIOGRAM COMPLETE: CPT | Performed by: FAMILY MEDICINE

## 2024-05-14 PROCEDURE — 99396 PREV VISIT EST AGE 40-64: CPT | Performed by: FAMILY MEDICINE

## 2024-05-14 PROCEDURE — 3078F DIAST BP <80 MM HG: CPT | Performed by: FAMILY MEDICINE

## 2024-05-14 PROCEDURE — 85025 COMPLETE CBC W/AUTO DIFF WBC: CPT | Performed by: FAMILY MEDICINE

## 2024-05-14 RX ORDER — ESCITALOPRAM OXALATE 10 MG/1
10 TABLET ORAL DAILY
COMMUNITY

## 2024-05-14 NOTE — PROGRESS NOTES
Galileo Villaseñor is a 43 year old male.   Chief Complaint   Patient presents with    Pre-Op Exam     HPI:   Galileo presents for preoperative evaluation and clearance for Excision of skin lesion, granuloma right retroauricular scalp, wound closure with local tissue rearrangement.  Surgery is on 5/28/24  Surgeon: Dr Ackerman.       Patient is a nonsmoker.   Denies n/v/d/dehydration  Denies rash, sore throat, wheezing, respiratory distress, cough, fever, sinus pressure, sinus headache, abdominal pain, blood in stool, sputum production, sick contacts    He is on depakote for hx of seizure in April 2023 with abnormal EEGs. He has not had seizure since then.     No hx of adverse reaction to anesthesia.   No hx of anemia.   No hx of DVT/PE    Pt is seeing psychiatrist and is on lexapro 10mg daily. Stil has some anxiety.        Current Outpatient Medications   Medication Sig Dispense Refill    divalproex  MG Oral Tablet 24 Hr Take 2 tablets (500 mg total) by mouth daily. 180 tablet 0    escitalopram 5 MG Oral Tab Take 1 tablet (5 mg total) by mouth daily.      Multiple Vitamins-Minerals (MULTIVITAMIN ADULT EXTRA C OR) Take 1 tablet by mouth daily.      omega-3 fatty acids 1000 MG Oral Cap Take 1,000 mg by mouth daily.        Allergies   Allergen Reactions    Penicillins HIVES      Past Medical History:    Abdominal pain    ALCOHOL USE    Alcoholism on both sides    Allergic rhinitis    Anxiety    After performing the test asked by dr amezcua i had an anxiety    Anxiety state    Bloating    Diarrhea, unspecified    Enlarged prostate    Flatulence/gas pain/belching    Frequent urination    Heartburn    Hydrocephalus (HCC)    Indigestion    Nodule of right lung    S/P  shunt    Seizure (HCC)    Sleep disturbance    Stress    Visual impairment    glasses    Wears glasses      Past Surgical History:   Procedure Laterality Date    Brain surgery  06/13/2023    Brain surgery  06/20/2023    Colonoscopy N/A 06/20/2023    Procedure:  COLONOSCOPY with retreival of foreign body;  Surgeon: Anibal Hodges DO;  Location: [facility] ENDOSCOPY    Hc implant shunt      HYDROCEPHALUS SHUNTED TWICE    Hernia surgery      Other  2021    VENTRICULOPERITONEAL SHUNT REVISION,  REVISION DISTAL CATHETER ONLY AT RIGHT ANTERIOR CERVICAL REGION (CAREY), Excision of right neck cyst, scar  ([facility]eka)    Other surgical history  , , 2021    Hydracephalus , shunts placed.  revision    Other surgical history N/A 2023    RIGHT OCCIPITAL SHUNT REMOVAL, LEFT  SHUNT EXTERNALIZATION AND  SHUNT TAP    Reconstr nose  1996    Vasectomy        Family History   Problem Relation Age of Onset    No Known Problems Father     No Known Problems Mother     Cancer Maternal Grandmother         SKIN CANCER    Heart Disease Maternal Grandmother     Alcohol and Other Disorders Associated Maternal Grandfather     Anxiety Maternal Grandfather     Heart Disease Maternal Grandfather     Cancer Paternal Grandmother         SKIN CANCER    Heart Disease Paternal Grandmother     Hypertension Paternal Grandfather     Alcohol and Other Disorders Associated Paternal Grandfather     Stroke Paternal Grandfather     Heart Disease Paternal Grandfather       Social History     Socioeconomic History    Marital status:    Tobacco Use    Smoking status: Former     Current packs/day: 0.00     Average packs/day: 0.5 packs/day for 5.0 years (2.5 ttl pk-yrs)     Types: Cigarettes     Start date:      Quit date: 2016     Years since quittin.3    Smokeless tobacco: Never    Tobacco comments:     Passive   Vaping Use    Vaping status: Never Used   Substance and Sexual Activity    Alcohol use: Yes     Alcohol/week: 4.0 standard drinks of alcohol     Types: 4 Cans of beer per week    Drug use: No     Comment: smoked cannabis for 4 years passive    Sexual activity: Yes     Partners: Female   Other Topics Concern    Pt has a pacemaker No    Pt has a defibrillator No     Reaction to local anesthetic No    Caffeine Concern No    Exercise Yes    Seat Belt Yes    Special Diet No    Stress Concern No    Weight Concern No        REVIEW OF SYSTEMS:   GENERAL HEALTH: feels well otherwise, denies fever  EYES: no visual complaints or deficits  HEENT: denies nasal congestion, sinus pain or sore throat  RESPIRATORY: denies shortness of breath, wheezing or cough  CARDIOVASCULAR: denies chest pain or BECKER; no palpitations  GI: denies nausea, vomiting, constipation, diarrhea   GENITAL/: no dysuria, urgency or frequency  MUSCULOSKELETAL: no joint complaints upper or lower extremities  NEURO: no sensory or motor complaint    Immunization History   Administered Date(s) Administered    Covid-19 Vaccine Pfizer 30 mcg/0.3 ml 04/06/2021, 04/27/2021, 11/23/2021    Covid-19 Vaccine Pfizer Bivalent 30mcg/0.3mL 09/09/2022    FLULAVAL 6 months & older 0.5 ml Prefilled syringe (44036) 10/13/2020, 10/15/2021    FLUZONE 6 months and older PFS 0.5 ml ([id number]) 09/16/2016, 09/10/2019, 10/13/2020, 10/15/2021    Fluarix 6 Months And Older 0.5 ml prefilled syringe ([id number]) 09/10/2019    Flublok Quad Influenza Vaccine ([id number]) 09/09/2022    Fluvirin, 3 Years & >, Im 10/23/2014    Hpv Virus Vaccine 9 Sue Im 10/20/2022, 12/22/2022, 10/20/2023    Influenza 10/15/2014, 10/08/2015, 10/10/2017, 08/15/2018, 09/10/2019, 09/11/2022, 10/08/2023    TDAP 01/01/2011, 07/24/2012, 10/20/2022       EXAM:   BP 92/62   Pulse 87   Temp 98 °F (36.7 °C) (Temporal)   Resp 16   Ht 5' 6\" (1.676 m)   Wt 172 lb (78 kg)   SpO2 97%   BMI 27.76 kg/m²   GENERAL: well developed, well nourished, in no apparent distress  SKIN: right postauricular area with raised erythematous lesion 1cm.   HEENT: normocephalic; normal nose, pharynx.  EYES: PERRLA  NECK: supple,   RESPIRATORY: clear to percussion and auscultation  CARDIOVASCULAR: S1, S2 normal, RRR; no S3, no S4; no click; murmur negative  ABDOMEN: normal active BS+, soft, nondistended; no  HSM; no masses; no bruits; no masses; nontender  MUSCULOSKELETAL: no acute synovitis upper or lower extremity  EXTREMITIES: no cyanosis, clubbing or edema, peripheral pulses intact  NEUROLOGIC: negative  PSYCHIATRIC: alert and oriented x 3; affect appropriate    EKG    Rate, intervals and axes as noted on EKG Report.  Rate: 55  Rhythm: Sinus Rhythm  Reading: sinus bradycardia, no acute st or t wave changes. Normal intervals.       ASSESSMENT & PLAN:   Preoperative Physical Exam:       Galileo was examined today and is an acceptable risk to proceed with surgery.   Patient to complete CBC and CMP.   EKG done today. Normal EKG.   Pt to see neurology before surgery.   Risks and benefits of surgery explained to patient.  Including;  Deep venous thrombosis, pulmonary embolus, bleeding, myocardial infarction, failure to relieve pain, wound infection, wound dehiscence, anesthesia complications, arrhythmia's etc.      ADDENDUM 5/15/24:  Labs reviewed, all stable, patient is acceptable risk to proceed with surgery.     Jessica Beatty,         This note was prepared using Dragon Medical voice recognition dictation software. As a result errors may occur. When identified these errors have been corrected. While every attempt is made to correct errors during dictation discrepancies may still exist

## 2024-05-15 ENCOUNTER — TELEPHONE (OUTPATIENT)
Dept: FAMILY MEDICINE CLINIC | Facility: CLINIC | Age: 44
End: 2024-05-15

## 2024-05-15 ENCOUNTER — APPOINTMENT (OUTPATIENT)
Dept: SPEECH THERAPY | Facility: HOSPITAL | Age: 44
End: 2024-05-15
Attending: NEUROLOGICAL SURGERY
Payer: COMMERCIAL

## 2024-05-16 ENCOUNTER — OFFICE VISIT (OUTPATIENT)
Dept: SPEECH THERAPY | Facility: HOSPITAL | Age: 44
End: 2024-05-16
Attending: NEUROLOGICAL SURGERY
Payer: COMMERCIAL

## 2024-05-16 ENCOUNTER — OFFICE VISIT (OUTPATIENT)
Dept: NEUROLOGY | Facility: CLINIC | Age: 44
End: 2024-05-16

## 2024-05-16 VITALS
WEIGHT: 171.5 LBS | SYSTOLIC BLOOD PRESSURE: 116 MMHG | DIASTOLIC BLOOD PRESSURE: 61 MMHG | BODY MASS INDEX: 27 KG/M2 | HEART RATE: 72 BPM | RESPIRATION RATE: 16 BRPM

## 2024-05-16 DIAGNOSIS — R56.9 SEIZURES (HCC): Primary | ICD-10-CM

## 2024-05-16 PROCEDURE — 3074F SYST BP LT 130 MM HG: CPT | Performed by: OTHER

## 2024-05-16 PROCEDURE — 99213 OFFICE O/P EST LOW 20 MIN: CPT | Performed by: OTHER

## 2024-05-16 PROCEDURE — 3078F DIAST BP <80 MM HG: CPT | Performed by: OTHER

## 2024-05-16 PROCEDURE — 92507 TX SP LANG VOICE COMM INDIV: CPT

## 2024-05-16 NOTE — PROGRESS NOTES
Neurology H&P    Galileo Villaseñor Patient Status:  No patient class for patient encounter    1980 MRN [id number]   [facility], [address] [place] [address] Attending No att. providers found   Hosp Day # 0 PCP Jessica Beatty,      Subjective:  Initial Clinic HPI 5/10/23  Galileo Villaseñor is a(n) 43 year old male with a PMH significant for anxiety and hydrocephalus s/p VPS. He comes to the neurology clinic for evaluation and management of sezures. 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 He had a shunt revision in childhood and then again by Dr. Aaron in . He comes with his wife today. He states that he was at a birthday party. He felt dizzy almost like an out of body experience when he entered the party. He states that sounds sounded muffled like a duglas brown cartoon. He was having difficulty functioning. He was unable to text on his phone. He was unable to form a complete thought or sentence. His wife states that he was not making any sense at all and his pupils were pinpoint. He had not had any EtOH or illicit's. His wife states that prior to getting to this party he was completely fine. His wife got him up and he was able to walk to the car. He has no recollection of this event. He was sheila to walk into the ED. He was unable to answeres any of the triage questions. He had a CTH and when brought back had a seizure. Per wife who witnessed this he made a sound like he was in pain or a groaning noise, his eyes then rolled back into his head. He then became very rigid and he started shaking all over. Per wife they gave him ativan and then this abated. He was admitted. He had 2 EEGs one of which showed frequent R temporal sharp waves per report and an MRI. He has followed up nuerosurgery as well.        Interim History:  Pt was last seen in the clinic on 23. Since that time he has not had any seizures. He has been doing well. No new numbness weakness or  tinging. He has not had any seizures since seeing me last. He states that he is still taking depakote ER 500mg Qday    Current Medications:  Current Outpatient Medications   Medication Sig Dispense Refill    escitalopram 10 MG Oral Tab Take 1 tablet (10 mg total) by mouth daily.      divalproex  MG Oral Tablet 24 Hr Take 2 tablets (500 mg total) by mouth daily. 180 tablet 0    Multiple Vitamins-Minerals (MULTIVITAMIN ADULT EXTRA C OR) Take 1 tablet by mouth daily.      omega-3 fatty acids 1000 MG Oral Cap Take 1,000 mg by mouth daily.         Problem List:  Patient Active Problem List   Diagnosis    Hx of hydrocephalus    Family history of skin cancer    Anxiety    Shunt malfunction    Scar of neck    Seizures (HCC)    Infection of  (ventriculoperitoneal) shunt (HCC)    Allergic rhinitis    Gastroesophageal reflux disease    Hydrocephalus (HCC)    Granulation tissue       PMHx:  Past Medical History:    Abdominal pain    ALCOHOL USE    Alcoholism on both sides    Allergic rhinitis    Anxiety    After performing the test asked by dr amezcua i had an anxiety    Anxiety state    Bloating    Diarrhea, unspecified    Enlarged prostate    Flatulence/gas pain/belching    Frequent urination    Heartburn    Hydrocephalus (HCC)    Indigestion    Nodule of right lung    PONV (postoperative nausea and vomiting)    S/P  shunt    REMOVED LAST YEAR- 2023 FOLLOWING INFECTION    Seizure (HCC)    Seizure disorder (HCC)    Sleep disturbance    Stress    Visual impairment    glasses    Wears glasses       PSHx:  Past Surgical History:   Procedure Laterality Date    Brain surgery  06/13/2023    Brain surgery  06/20/2023    Colonoscopy N/A 06/20/2023    Procedure: COLONOSCOPY with retreival of foreign body;  Surgeon: Anibal Hodges DO;  Location:  ENDOSCOPY    Hc implant shunt      HYDROCEPHALUS SHUNTED TWICE    Hernia surgery  1983    Other  11/08/2021    VENTRICULOPERITONEAL SHUNT REVISION,  REVISION DISTAL CATHETER ONLY  AT RIGHT ANTERIOR CERVICAL REGION (CAREY), Excision of right neck cyst, scar  (Meka)    Other surgical history  , , 2021    Hydracephalus , shunts placed.  revision    Other surgical history N/A 2023    RIGHT OCCIPITAL SHUNT REMOVAL, LEFT  SHUNT EXTERNALIZATION AND  SHUNT TAP    Reconstr nose      Vasectomy         SocHx:  Social History     Socioeconomic History    Marital status:    Tobacco Use    Smoking status: Former     Current packs/day: 0.00     Average packs/day: 0.5 packs/day for 5.0 years (2.5 ttl pk-yrs)     Types: Cigarettes     Start date:      Quit date: 2016     Years since quittin.3    Smokeless tobacco: Never    Tobacco comments:     Passive   Vaping Use    Vaping status: Never Used   Substance and Sexual Activity    Alcohol use: Yes     Alcohol/week: 4.0 standard drinks of alcohol     Types: 4 Cans of beer per week     Comment: SOCIAL    Drug use: No     Comment: smoked cannabis for 4 years passive    Sexual activity: Yes     Partners: Female   Other Topics Concern    Pt has a pacemaker No    Pt has a defibrillator No    Reaction to local anesthetic No    Caffeine Concern Yes     Comment: coffee daily    Exercise Yes     Comment: 2-4 days per week    Seat Belt Yes    Special Diet No    Stress Concern No    Weight Concern No     Social Determinants of Health     Food Insecurity: Low Risk  (2023)    Received from [facility]    Food Insecurity     Have there been times that your food ran out, and you didn't have money to get more?: No     Are there times that you worry that this might happen?: No   Transportation Needs: Low Risk  (2023)    Received from [facility]    Transportation Needs     Do you have trouble getting transportation to medical appointments?: No        Family History:  Family History   Problem Relation Age of  Onset    No Known Problems Father     No Known Problems Mother     Cancer Maternal Grandmother         SKIN CANCER    Heart Disease Maternal Grandmother     Alcohol and Other Disorders Associated Maternal Grandfather     Anxiety Maternal Grandfather     Heart Disease Maternal Grandfather     Cancer Paternal Grandmother         SKIN CANCER    Heart Disease Paternal Grandmother     Hypertension Paternal Grandfather     Alcohol and Other Disorders Associated Paternal Grandfather     Stroke Paternal Grandfather     Heart Disease Paternal Grandfather           ROS:  10 point ROS completed and was negative, except for pertinent positive and negatives stated in subjective.    Objective/Physical Exam:    Vital Signs:  Blood pressure 116/61, pulse 72, resp. rate 16, weight 171 lb 8.3 oz (77.8 kg).    Gen: Awake and in no apparent distress  HEENT: moist mucus membranes  Neck: Supple  Cardiovascular: Regular rate and rhythm, no murmur  Pulm: CTAB  GI: non-tender, normal bowel sounds  Skin: normal, dry  Extremities: No clubbing or cyanosis      Neurologic:   MENTAL STATUS: alert, ox3, normal attention, language and fund of knowledge.      CRANIAL NERVES II to XII: PERRLA, no ptosis or diplopia, EOM intact, facial sensation intact, strong eye closure, face is symmetric, no dysarthria, tongue midline,  no tongue fasciculations or atrophy, strong shoulder shrug.    MOTOR EXAMINATION: normal tone, no fasciculations, normal strength throughout in UEs and LEs      SENSORY EXAMINATION:  UE: intact to light touch, pinprick intact  LE: intact to light touch, pinprick intact    COORDINATION:  No dysmetria, or intention tremors     REFLEXES: 2+ at biceps, 3+ brachioradialis, 3+ at patella, 2+ at the ankles, + BUE Hoffmans    GAIT: normal stance,  tandem well.    Romberg's: negative      Labs:       Imaging:  [facility] 12/3/21  FINDINGS:         Right posterior temporal ventriculostomy catheter that extends into CSF cystic space adjacent to the  atria the right lateral ventricle is stable.  Right suboccipital catheter extending to the apex of the tentorium and adjacent to the straight sinus is   stable.  Left parietal ventriculostomy catheter has tip adjacent to the septum pellucidum and in the right frontal horn is stable.  Additional left parietal ventriculostomy catheter extending along the subdural space/subarachnoid space is stable.       Sequelae of left frontal parietal craniotomy and right temporal craniotomy is noted.       Dilated CSF space adjacent to the right lateral ventricle is stable.  Mild asymmetric prominence of the right temporal horn with CSF space extends over the tentorium and adjacent to the right cerebral peduncle is stable.       Arachnoid cyst adjacent to the right cerebellar hemisphere is relatively stable.  Associated mass effect is unchanged.       Cerebellar tonsillar ectopia is stable.       Mild to moderate mucoperiosteal thickening of the paranasal sinuses is stable.                                    Impression   CONCLUSION:         No significant changes since prior exam.          EEG 4/24/23  Interpretation:     This is an abnormal EEG due to frequent high amplitude slow waves in the   right temporal area, the majority of which do not appear epileptiform.     Clinical Correlation:     The EEG is improved compared to yesterday but still shows significant   neuronal dysfunction and risk for seizures from the right temporal area.     EEG 4/23/23  Interpretation:     This is an abnormal EEG due to slowing and frequent epileptiform   discharges in the right temporal area.     Clinical Correlation:     The finding indicates a significant risk for further right temporal lobe   seizures.     EEG 8/2023  IMPRESSION:  This EEG is a normal study recorded in the awake and asleep states. No focal, lateralizing, or epileptiform activity is seen and no seizures are recorded.      Report covers  Start 8/24/23 at 1154  End 8/24/23 at  0513      Assessment:  This is a 44 y/o male with a h/o a congential hydrocephalus s/p shunting and revision and seizures.  I will continue Depakote 500mg ER Qday for now. I will repeat the EEG.  We can go down on his Depakote to 250 mg daily at this time.  If EEG does not show any sort of ictal activity or interictal discharges we can consider stopping the Depakote.  He never had a seizure before this one-time event.  Also noted to have CNS infections time which may have been a contributing factor to the seizures so I feel like we can reduce and taper off his Depakote.  I did advise he would not be able to drive until seeing me next we taper off the Depakote.      Plan:  1. Seizures  - CTH and OSH records reviewed  - EEG reports reviewed  - EEG in 8/2023 was normal  - EEG   - Continue Depakote 250mg ER Qday  - No driving until reevaluated in clinic     RTC in 4 months    Dewey Bowens, DO  Neurology

## 2024-05-16 NOTE — PROGRESS NOTES
Diagnosis:   Memory difficulty [R41.3]      Referring Provider: Viraj Good  Date of Evaluation:   4/17/2024    Precautions:   Cognition Next MD visit:   none scheduled  Date of Surgery: n/a   Insurance Primary/Secondary: [employer] OUT OF STATE / N/A       # Auth Visits: 12   Total Timed Treatment: 45 min  Date POC Expires: 7/16/2024  Total Treatment time: 45 min  Charges: [id number]   Treatment Number: 7    Subjective: Patient arrived on time to session. Patient participated actively in therapeutic tasks. Patient had neuro f/u today with decrease in seizure medication.     Pain: No pain reported on this date. Patient not being seen for pain.    Objective:  Goals: (to be met in 12 visits)   STG 1: Patient will independently verbalize compensatory memory strategies (processing, working memory, short-term memory, attention, problem-solving) and describe 1 use per strategy.  Progress: Patient demonstrated some difficulty recalling compensatory memory strategy for recall of novel paragraph length stimuli. Patient able to recall given mod verbal cueing.     STG 2: Patient will recall novel paragraph length stimuli with 90% accuracy following a 20 minute delay, with min verbal and visual cues for use of compensatory memory strategies.  Progress: 85% given mod verbal and visual cueing following 10 minute delay.     STG 3: Patient will accurately complete working memory tasks within 90% of opportunities given min verbal and visual cueing.  Progress: 90% given mod verbal and visual cueing. Patient completed simple working memory tasks with increased accuracy on this date. Patient continues to require cueing for tasks of increased complexity.     STG 4: Patient will utilize accurate planning and note-taking skills within 90% of opportunities given min verbal and visual cueing.  Progress: Discussed and generated external support (i.e., phone reminder) for planning and preview of week and next day for tasks/events that are outside  of patient's typical routine. Patient demonstrated understanding. Modifications were made to individualize plan to patient's personal situations. Patient to utilize strategy within next week.     STG 5: Patient will divide attention between two higher level complexity tasks given less than 2 verbal redirections/reminders.   Progress: Required average of 2 verbal redirections/reminders. Goal met; see updated goal.  STG 5B: Patient will divide attention between two higher level complexity tasks given less than 1 verbal redirections/reminders.     HEP: External memory strategies  Education: Evaluation findings    Assessment: Patient presents with cognitive deficits within the following areas: memory (working, recall), attention, and executive functioning. Patient's deficits impact his ability to transition to work tasks of increased complexity and follow through with home tasks. Patient continues to benefit from skilled cueing and coaching to increase accuracy and carryover of use of compensatory strategies and external cognitive supports. Patient requires skilled cueing and repetition for recall of novel information. Further intervention is medically necessary.       Plan: Continue speech-language therapy targeting cognitive-communication.

## 2024-05-16 NOTE — PATIENT INSTRUCTIONS
After your visit at the [facility] today,  please direct any follow up questions or medication needs to the staff in our [place] office so that your concerns may be promptly addressed.  We are available through [employer] or at the numbers below:    The phone number is:   [phone] option #1    The fax number is:  [phone]    Your pharmacy should also send any requests electronically to the [place] office.  Refill policies:    Allow 2-3 business days for refills; controlled substances may take longer.  Contact your pharmacy at least 5 days prior to running out of medication and have them send an electronic request or submit request through the “request refill” option in your [employer] account.  Refills are not addressed on weekends; covering physicians do not authorize routine medications on weekends.  No narcotics or controlled substances are refilled after noon on Fridays or by on call physicians.  By law, narcotics must be electronically prescribed.  A 30 day supply with no refills is the maximum allowed.  If your prescription is due for a refill, you may be due for a follow up appointment.  To best provide you care, patients receiving routine medications need to be seen at least once a year.  Patients receiving narcotic/controlled substance medications need to be seen at least once every 3 months.  In the event that your preferred pharmacy does not have the requested medication in stock (e.g. Backordered), it is your responsibility to find another pharmacy that has the requested medication available.  We will gladly send a new prescription to that pharmacy at your request.    Scheduling Tests:    If your physician has ordered radiology tests such as MRI or CT scans, please contact Central Scheduling at [phone] right away to schedule the test.  Once scheduled, the [facility] Centralized Referral Team will work with your insurance carrier to obtain pre-certification or prior authorization.   Depending on your insurance carrier, approval may take 3-10 days.  It is highly recommended patients assure they have received an authorization before having a test performed.  If test is done without insurance authorization, patient may be responsible for the entire amount billed.      Precertification and Prior Authorizations:  If your physician has recommended that you have a procedure or additional testing performed the [facility] Centralized Referral Team will contact your insurance carrier to obtain pre-certification or prior authorization.    You are strongly encouraged to contact your insurance carrier to verify that your procedure/test has been approved and is a COVERED benefit.  Although the [facility] Centralized Referral Team does its due diligence, the insurance carrier gives the disclaimer that \"Although the procedure is authorized, this does not guarantee payment.\"    Ultimately the patient is responsible for payment.   Thank you for your understanding in this matter.  Paperwork Completion:  If you require FMLA or disability paperwork for your recovery, please make sure to either drop it off or have it faxed to our office at [phone]. Be sure the form has your name and date of birth on it.  The form will be faxed to our Forms Department and they will complete it for you.  There is a 25$ fee for all forms that need to be filled out.  Please be aware there is a 10-14 day turnaround time.  You will need to sign a release of information (KEATON) form if your paperwork does not come with one.  You may call the Forms Department with any questions at [phone].  Their fax number is [phone].

## 2024-05-17 ENCOUNTER — TELEPHONE (OUTPATIENT)
Dept: NEUROLOGY | Facility: CLINIC | Age: 44
End: 2024-05-17

## 2024-05-17 ENCOUNTER — PATIENT MESSAGE (OUTPATIENT)
Dept: NEUROLOGY | Facility: CLINIC | Age: 44
End: 2024-05-17

## 2024-05-17 DIAGNOSIS — R56.9 SEIZURES (HCC): ICD-10-CM

## 2024-05-17 NOTE — TELEPHONE ENCOUNTER
From: Galileo Villaseñor  To: Dewey Bowens  Sent: 5/17/2024 3:22 PM CDT  Subject: Letter for Driving    Hi Dr. Bowens,    Great to see you yesterday, and I am thrilled to hear that you are willing to start weening me off Depakote (slowly). I started today as my first day of a half dose (250 mg). So far so good! Could I please ask that you or a nurse draft a letter that my driving privileges will be suspended until I successfully ween off Depakote with no instances? I am happy to pass this on, however I need supporting documentation for work.     Thank you in advance.     Galileo

## 2024-05-17 NOTE — TELEPHONE ENCOUNTER
LOV 05/16/2024    Plan:  1. Seizures  - CTH and OSH records reviewed  - EEG reports reviewed  - EEG in 8/2023 was normal  - EEG   - Continue Depakote 250mg ER Qday  - No driving until reevaluated in clinic    Sent letter restricting driving per last office visit.

## 2024-05-17 NOTE — TELEPHONE ENCOUNTER
Patient having surgery for excision of skin lesion on 5/28/24. Patient is currently only on divalproex  mg

## 2024-05-17 NOTE — TELEPHONE ENCOUNTER
Dr. Ackerman office is requesting to obtain clearance from Dr. Bowens for patient upcoming surgery on 05/28/2024.     Dr. Meka pierce would like to know if there is any restrictions for patient or any recommendations on when to stop patients medications. Please advise    pt resting comfortably at this time, remains on cardiac monitor. in no acute distress. awaiting 2nd troponin, call bell in reach.

## 2024-05-20 ENCOUNTER — OFFICE VISIT (OUTPATIENT)
Dept: SPEECH THERAPY | Facility: HOSPITAL | Age: 44
End: 2024-05-20
Attending: NEUROLOGICAL SURGERY
Payer: COMMERCIAL

## 2024-05-20 PROCEDURE — 92507 TX SP LANG VOICE COMM INDIV: CPT

## 2024-05-20 RX ORDER — DIVALPROEX SODIUM 250 MG/1
500 TABLET, EXTENDED RELEASE ORAL DAILY
Qty: 180 TABLET | Refills: 0 | Status: SHIPPED | OUTPATIENT
Start: 2024-05-20

## 2024-05-20 NOTE — TELEPHONE ENCOUNTER
Called patient and informed him of Dr. Bowens's  recommendation to take 500 mg of Divalproex until surgical procedure completed.    Pt VU and will call back after surgical procedure to discuss weaning off medications

## 2024-05-20 NOTE — TELEPHONE ENCOUNTER
Can we musa the patient and tell him that if he is having surgery I would like him to continue the Depakote at 500mg per day not 250mg per day until after surgery

## 2024-05-20 NOTE — PROGRESS NOTES
Diagnosis:   Memory difficulty [R41.3]      Referring Provider: Viraj Good  Date of Evaluation:   4/17/2024    Precautions:   Cognition Next MD visit:   none scheduled  Date of Surgery: n/a   Insurance Primary/Secondary: BCBS OUT OF STATE / N/A       # Auth Visits: 12   Total Timed Treatment: 45 min  Date POC Expires: 7/16/2024  Total Treatment time: 45 min  Charges: [id number]   Treatment Number: 8    Subjective: Patient arrived on time to session. Patient participated actively in therapeutic tasks. Patient to have procedure on 5/28. Discussed reminder systems for pre-op.     Pain: No pain reported on this date. Patient not being seen for pain.    Objective:  Goals: (to be met in 12 visits)   STG 1: Patient will independently verbalize compensatory memory strategies (processing, working memory, short-term memory, attention, problem-solving) and describe 1 use per strategy.  Progress: Patient able to utilize memory strategies including note-taking with increased accuracy.     STG 2: Patient will recall novel paragraph length stimuli with 90% accuracy following a 20 minute delay, with min verbal and visual cues for use of compensatory memory strategies.  Progress: 85% given mod verbal and visual cueing following 10 minute delay.     STG 3: Patient will accurately complete working memory tasks within 90% of opportunities given min verbal and visual cueing.  Progress: 90% given mod verbal and visual cueing. Patient completed simple working memory tasks with increased accuracy on this date. Patient continues to require cueing for tasks of increased complexity.     STG 4: Patient will utilize accurate planning and note-taking skills within 90% of opportunities given min verbal and visual cueing.  Progress: Followed up on use of preview and reminders for next day. Patient demonstrated increased independence with use of external support.     STG 5B: Patient will divide attention between two higher level complexity tasks  given less than 1 verbal redirections/reminders.   Progress: Required average of 2 verbal redirections/reminders.     HEP: External memory strategies  Education: Evaluation findings    Assessment: Patient presents with cognitive deficits within the following areas: memory (working, recall), attention, and executive functioning. Patient's deficits impact his ability to transition to work tasks of increased complexity and follow through with home tasks. Patient continues to require skilled intervention including verbal and visual cueing and modeling. Skilled intervention is medically necessary.       Plan: Continue speech-language therapy targeting cognitive-communication.

## 2024-05-22 ENCOUNTER — OFFICE VISIT (OUTPATIENT)
Dept: SPEECH THERAPY | Facility: HOSPITAL | Age: 44
End: 2024-05-22
Attending: NEUROLOGICAL SURGERY
Payer: COMMERCIAL

## 2024-05-22 ENCOUNTER — OFFICE VISIT (OUTPATIENT)
Dept: SURGERY | Facility: CLINIC | Age: 44
End: 2024-05-22

## 2024-05-22 DIAGNOSIS — L90.5 SCAR OF NECK: ICD-10-CM

## 2024-05-22 DIAGNOSIS — L92.9 GRANULATION TISSUE: Primary | ICD-10-CM

## 2024-05-22 PROCEDURE — 99213 OFFICE O/P EST LOW 20 MIN: CPT | Performed by: SURGERY

## 2024-05-22 PROCEDURE — 92507 TX SP LANG VOICE COMM INDIV: CPT

## 2024-05-22 NOTE — PROGRESS NOTES
Diagnosis:   Memory difficulty [R41.3]      Referring Provider: Viraj Good  Date of Evaluation:   4/17/2024    Precautions:   Cognition Next MD visit:   none scheduled  Date of Surgery: n/a   Insurance Primary/Secondary: BCBS OUT OF STATE / N/A       # Auth Visits: 12   Total Timed Treatment: 45 min  Date POC Expires: 7/16/2024  Total Treatment time: 45 min  Charges: [id number]   Treatment Number: 9    Subjective: Patient arrived on time to session. Patient participated actively in therapeutic tasks.    Pain: No pain reported on this date. Patient not being seen for pain.    Objective:  Goals: (to be met in 12 visits)   STG 1: Patient will independently verbalize compensatory memory strategies (processing, working memory, short-term memory, attention, problem-solving) and describe 1 use per strategy.  Progress: Patient verbalized use of compensatory strategies and external support given min cueing.     STG 2: Patient will recall novel paragraph length stimuli with 90% accuracy following a 20 minute delay, with min verbal and visual cues for use of compensatory memory strategies.  Progress: 85% given min verbal cueing following 10 minute delay. Patient benefited from verbal cueing to support complex encoding of information (e.g., repetition aloud, re-reading, writing key words).    STG 3: Patient will accurately complete working memory tasks within 90% of opportunities given min verbal and visual cueing.  Progress: 90% given mod verbal and visual cueing. Patient increased use of working memory for functional opportunities within structured environment.     STG 4: Patient will utilize accurate planning and note-taking skills within 90% of opportunities given min verbal and visual cueing.  Progress: Patient utilized planning and note-taking skills within 85% of opportunities given min verbal and visual cues.     STG 5B: Patient will divide attention between two higher level complexity tasks given less than 1 verbal  redirections/reminders.   Progress: Required average of 2 verbal redirections/reminders.     HEP: External memory strategies  Education: Complex encoding of information    Assessment: Patient presents with cognitive deficits within the following areas: memory (working, recall), attention, and executive functioning. Patient's deficits impact his ability to transition to work tasks of increased complexity and follow through with home tasks. Patient demonstrates improvement toward goals and increased independence with use of compensatory memory and attention strategies. Patient has generalized use of discussed external supports to functional home opportunities. Patient continues to require skilled cueing to transition skills to unscheduled/non-routine tasks. Continued intervention is medically necessary.       Plan: Continue speech-language therapy targeting cognitive-communication.

## 2024-05-22 NOTE — H&P (VIEW-ONLY)
Estabilshed Patient Consultation    Chief Complaint: right retroauricular granulation tissue      History of Present Illness:   Galileo Villaseñor is a 43 year old male who returns to the office with recurrent right retroauricular granulation tissue. He previously had this area biopsied with his dermatologist on 1/23/2024. Pathology showed benign skin with polypoid granulation tissue, inflamed. He saw me on 2/7/2024. At that visit, this area had healed with a small scab. He then noted the granulation tissue returned. He is here today to review treatment options.    He is s/p excision of right neck open wound granuloma, skin and subcutaneous tissue, complex layered wound closure right neck on 6/10/2023. This was performed at the same time as his surgery for his infected  shunt.   He is also s/p revision of ventriculopperitoneal shunt (Dr. Aaron) and excision of granuloma, right neck cyst and complex layered wound closure right neck on 11/8/2021.       Past Medical History:      Past Medical History:    Abdominal pain    ALCOHOL USE    Alcoholism on both sides    Allergic rhinitis    Anxiety    After performing the test asked by dr aaron i had an anxiety    Anxiety state    Bloating    Diarrhea, unspecified    Enlarged prostate    Flatulence/gas pain/belching    Frequent urination    Heartburn    Hydrocephalus (HCC)    Indigestion    Nodule of right lung    PONV (postoperative nausea and vomiting)    S/P  shunt    REMOVED LAST YEAR- 2023 FOLLOWING INFECTION    Seizure (HCC)    Seizure disorder (HCC)    Sleep disturbance    Stress    Visual impairment    glasses    Wears glasses         Past Surgical History:  Past Surgical History:   Procedure Laterality Date    Brain surgery  06/13/2023    Brain surgery  06/20/2023    Colonoscopy N/A 06/20/2023    Procedure: COLONOSCOPY with retreival of foreign body;  Surgeon: Anibal Hodges DO;  Location:  ENDOSCOPY     implant shunt      HYDROCEPHALUS SHUNTED TWICE     Hernia surgery  1983    Other  11/08/2021    VENTRICULOPERITONEAL SHUNT REVISION,  REVISION DISTAL CATHETER ONLY AT RIGHT ANTERIOR CERVICAL REGION (CAREY), Excision of right neck cyst, scar  (Meka)    Other surgical history  1983, 1987, 11/8/2021    Hydracephalus , shunts placed. 11/8 revision    Other surgical history N/A 06/12/2023    RIGHT OCCIPITAL SHUNT REMOVAL, LEFT  SHUNT EXTERNALIZATION AND  SHUNT TAP    Reconstr nose  1996    Vasectomy  2021         Medications:    No outpatient medications have been marked as taking for the 5/22/24 encounter (Office Visit) with Fanny Ackerman MD.         Allergies:    Allergies   Allergen Reactions    Penicillins HIVES         Family History:   Family History   Problem Relation Age of Onset    No Known Problems Father     No Known Problems Mother     Cancer Maternal Grandmother         SKIN CANCER    Heart Disease Maternal Grandmother     Alcohol and Other Disorders Associated Maternal Grandfather     Anxiety Maternal Grandfather     Heart Disease Maternal Grandfather     Cancer Paternal Grandmother         SKIN CANCER    Heart Disease Paternal Grandmother     Hypertension Paternal Grandfather     Alcohol and Other Disorders Associated Paternal Grandfather     Stroke Paternal Grandfather     Heart Disease Paternal Grandfather          Social History:  History   Alcohol Use    4.0 standard drinks of alcohol/week    4 Cans of beer per week     Comment: SOCIAL       History   Smoking Status    Former    Types: Cigarettes   Smokeless Tobacco    Never       History   Drug Use No     Comment: smoked cannabis for 4 years passive         Review of Systems:    The patient reports: see HPI  All other systems are unremarkable.      Physical Exam:    There were no vitals taken for this visit.    Constitutional: The patient is an alert, oriented and well-developed.     Neurologic: Speech patterns and movements are normal.     Psychiatric: Affect is appropriate.    Eyes:  Conjunctiva are clear, non-icteric. PERRL    ENT: no obvious abnormality, no ear drainage, mucous membranes moist and pink    Integument/Skin: right retroauricular area with 1.5 x 0.8 cm granuloma, dry, not actively oozing    Respiratory: Normal respiratory effort.     Cardiovascular: no cyanosis, no edema    Musculoskeletal: Extremities unremarkable, without edema, tenderness or deformities    Impression:   Galileo Villaseñor  is a 43 year old with right retroauricular granulation tissue     Discussion and Plan:  The patient was counseled on the different treatment options.     We reviewed the plan for excision of skin lesion, granuloma right retroauricular scalp, wound closure with local tissue rearrangement. This will be done with Dr. Good. We discussed the procedure and expected post-operative course in detail. Patient is scheduled for this on 5/28/2024. We discussed the possibility of recurrence.     The different treatment options were discussed with the patient.  The procedures and the postoperative care was discussed in detail.  Potential risks complications benefits and alternatives were discussed.  Risks and complications including but not limited to infection, bleeding, scarring, damage to surrounding structures, such as nerves, blood vessels, muscles,  tendons, risk of hematoma, seroma, foreign body reaction, allergic reaction, wound dehiscences, delayed wound healing, need for further surgery.    Patient understands and wishes to proceed with the procedure, questions were answered.    25 minutes were spent with the patient, from which 20 minutes were spent counseling the patient and coordinating care.

## 2024-05-27 ENCOUNTER — ANESTHESIA EVENT (OUTPATIENT)
Dept: SURGERY | Facility: HOSPITAL | Age: 44
End: 2024-05-27

## 2024-05-28 ENCOUNTER — HOSPITAL ENCOUNTER (OUTPATIENT)
Facility: HOSPITAL | Age: 44
Setting detail: HOSPITAL OUTPATIENT SURGERY
Discharge: HOME | End: 2024-05-28
Attending: SURGERY | Admitting: SURGERY

## 2024-05-28 ENCOUNTER — ANESTHESIA (OUTPATIENT)
Dept: SURGERY | Facility: HOSPITAL | Age: 44
End: 2024-05-28

## 2024-05-28 VITALS
BODY MASS INDEX: 27.47 KG/M2 | HEART RATE: 67 BPM | WEIGHT: 173 LBS | TEMPERATURE: 98 F | RESPIRATION RATE: 16 BRPM | SYSTOLIC BLOOD PRESSURE: 119 MMHG | HEIGHT: 66.5 IN | DIASTOLIC BLOOD PRESSURE: 76 MMHG | OXYGEN SATURATION: 96 %

## 2024-05-28 DIAGNOSIS — L92.9 GRANULATION TISSUE: ICD-10-CM

## 2024-05-28 PROCEDURE — 87206 SMEAR FLUORESCENT/ACID STAI: CPT | Performed by: SURGERY

## 2024-05-28 PROCEDURE — 87070 CULTURE OTHR SPECIMN AEROBIC: CPT | Performed by: SURGERY

## 2024-05-28 PROCEDURE — 87102 FUNGUS ISOLATION CULTURE: CPT | Performed by: SURGERY

## 2024-05-28 PROCEDURE — 87176 TISSUE HOMOGENIZATION CULTR: CPT | Performed by: SURGERY

## 2024-05-28 PROCEDURE — 87076 CULTURE ANAEROBE IDENT EACH: CPT | Performed by: SURGERY

## 2024-05-28 PROCEDURE — 87075 CULTR BACTERIA EXCEPT BLOOD: CPT | Performed by: SURGERY

## 2024-05-28 PROCEDURE — 87205 SMEAR GRAM STAIN: CPT | Performed by: SURGERY

## 2024-05-28 PROCEDURE — 0HB0XZZ EXCISION OF SCALP SKIN, EXTERNAL APPROACH: ICD-10-PCS | Performed by: SURGERY

## 2024-05-28 RX ORDER — SODIUM CHLORIDE, SODIUM LACTATE, POTASSIUM CHLORIDE, CALCIUM CHLORIDE 600; 310; 30; 20 MG/100ML; MG/100ML; MG/100ML; MG/100ML
INJECTION, SOLUTION INTRAVENOUS CONTINUOUS
Status: DISCONTINUED | OUTPATIENT
Start: 2024-05-28 | End: 2024-05-28

## 2024-05-28 RX ORDER — MIDAZOLAM HYDROCHLORIDE 1 MG/ML
INJECTION INTRAMUSCULAR; INTRAVENOUS AS NEEDED
Status: DISCONTINUED | OUTPATIENT
Start: 2024-05-28 | End: 2024-05-28 | Stop reason: SURG

## 2024-05-28 RX ORDER — LIDOCAINE HYDROCHLORIDE AND EPINEPHRINE 10; 10 MG/ML; UG/ML
INJECTION, SOLUTION INFILTRATION; PERINEURAL AS NEEDED
Status: DISCONTINUED | OUTPATIENT
Start: 2024-05-28 | End: 2024-05-28 | Stop reason: HOSPADM

## 2024-05-28 RX ORDER — HYDROMORPHONE HYDROCHLORIDE 1 MG/ML
0.2 INJECTION, SOLUTION INTRAMUSCULAR; INTRAVENOUS; SUBCUTANEOUS EVERY 5 MIN PRN
Status: DISCONTINUED | OUTPATIENT
Start: 2024-05-28 | End: 2024-05-28

## 2024-05-28 RX ORDER — HYDROCODONE BITARTRATE AND ACETAMINOPHEN 5; 325 MG/1; MG/1
2 TABLET ORAL ONCE AS NEEDED
Status: DISCONTINUED | OUTPATIENT
Start: 2024-05-28 | End: 2024-05-28

## 2024-05-28 RX ORDER — HYDROCODONE BITARTRATE AND ACETAMINOPHEN 5; 325 MG/1; MG/1
1 TABLET ORAL ONCE AS NEEDED
Status: DISCONTINUED | OUTPATIENT
Start: 2024-05-28 | End: 2024-05-28

## 2024-05-28 RX ORDER — NALOXONE HYDROCHLORIDE 0.4 MG/ML
80 INJECTION, SOLUTION INTRAMUSCULAR; INTRAVENOUS; SUBCUTANEOUS AS NEEDED
Status: DISCONTINUED | OUTPATIENT
Start: 2024-05-28 | End: 2024-05-28

## 2024-05-28 RX ORDER — HYDROMORPHONE HYDROCHLORIDE 1 MG/ML
0.6 INJECTION, SOLUTION INTRAMUSCULAR; INTRAVENOUS; SUBCUTANEOUS EVERY 5 MIN PRN
Status: DISCONTINUED | OUTPATIENT
Start: 2024-05-28 | End: 2024-05-28

## 2024-05-28 RX ORDER — ISOSULFAN BLUE 50 MG/5ML
INJECTION, SOLUTION SUBCUTANEOUS AS NEEDED
Status: DISCONTINUED | OUTPATIENT
Start: 2024-05-28 | End: 2024-05-28 | Stop reason: HOSPADM

## 2024-05-28 RX ORDER — ONDANSETRON 2 MG/ML
INJECTION INTRAMUSCULAR; INTRAVENOUS AS NEEDED
Status: DISCONTINUED | OUTPATIENT
Start: 2024-05-28 | End: 2024-05-28 | Stop reason: SURG

## 2024-05-28 RX ORDER — SCOLOPAMINE TRANSDERMAL SYSTEM 1 MG/1
1 PATCH, EXTENDED RELEASE TRANSDERMAL ONCE
Status: DISCONTINUED | OUTPATIENT
Start: 2024-05-28 | End: 2024-05-28 | Stop reason: HOSPADM

## 2024-05-28 RX ORDER — MIDAZOLAM HYDROCHLORIDE 1 MG/ML
1 INJECTION INTRAMUSCULAR; INTRAVENOUS EVERY 5 MIN PRN
Status: DISCONTINUED | OUTPATIENT
Start: 2024-05-28 | End: 2024-05-28

## 2024-05-28 RX ORDER — LIDOCAINE HYDROCHLORIDE 10 MG/ML
INJECTION, SOLUTION EPIDURAL; INFILTRATION; INTRACAUDAL; PERINEURAL AS NEEDED
Status: DISCONTINUED | OUTPATIENT
Start: 2024-05-28 | End: 2024-05-28 | Stop reason: SURG

## 2024-05-28 RX ORDER — ONDANSETRON 2 MG/ML
4 INJECTION INTRAMUSCULAR; INTRAVENOUS EVERY 6 HOURS PRN
Status: DISCONTINUED | OUTPATIENT
Start: 2024-05-28 | End: 2024-05-28

## 2024-05-28 RX ORDER — MEPERIDINE HYDROCHLORIDE 25 MG/ML
12.5 INJECTION INTRAMUSCULAR; INTRAVENOUS; SUBCUTANEOUS AS NEEDED
Status: DISCONTINUED | OUTPATIENT
Start: 2024-05-28 | End: 2024-05-28

## 2024-05-28 RX ORDER — ACETAMINOPHEN 500 MG
1000 TABLET ORAL ONCE
Status: DISCONTINUED | OUTPATIENT
Start: 2024-05-28 | End: 2024-05-28 | Stop reason: HOSPADM

## 2024-05-28 RX ORDER — ROCURONIUM BROMIDE 10 MG/ML
INJECTION, SOLUTION INTRAVENOUS AS NEEDED
Status: DISCONTINUED | OUTPATIENT
Start: 2024-05-28 | End: 2024-05-28 | Stop reason: SURG

## 2024-05-28 RX ORDER — DEXAMETHASONE SODIUM PHOSPHATE 4 MG/ML
VIAL (ML) INJECTION AS NEEDED
Status: DISCONTINUED | OUTPATIENT
Start: 2024-05-28 | End: 2024-05-28 | Stop reason: SURG

## 2024-05-28 RX ORDER — HYDROMORPHONE HYDROCHLORIDE 1 MG/ML
0.4 INJECTION, SOLUTION INTRAMUSCULAR; INTRAVENOUS; SUBCUTANEOUS EVERY 5 MIN PRN
Status: DISCONTINUED | OUTPATIENT
Start: 2024-05-28 | End: 2024-05-28

## 2024-05-28 RX ORDER — DIPHENHYDRAMINE HYDROCHLORIDE 50 MG/ML
12.5 INJECTION INTRAMUSCULAR; INTRAVENOUS AS NEEDED
Status: DISCONTINUED | OUTPATIENT
Start: 2024-05-28 | End: 2024-05-28

## 2024-05-28 RX ORDER — ACETAMINOPHEN 500 MG
1000 TABLET ORAL ONCE AS NEEDED
Status: DISCONTINUED | OUTPATIENT
Start: 2024-05-28 | End: 2024-05-28

## 2024-05-28 RX ADMIN — SODIUM CHLORIDE, SODIUM LACTATE, POTASSIUM CHLORIDE, CALCIUM CHLORIDE: 600; 310; 30; 20 INJECTION, SOLUTION INTRAVENOUS at 08:42:00

## 2024-05-28 RX ADMIN — ROCURONIUM BROMIDE 50 MG: 10 INJECTION, SOLUTION INTRAVENOUS at 07:36:00

## 2024-05-28 RX ADMIN — MIDAZOLAM HYDROCHLORIDE 2 MG: 1 INJECTION INTRAMUSCULAR; INTRAVENOUS at 07:28:00

## 2024-05-28 RX ADMIN — LIDOCAINE HYDROCHLORIDE 50 MG: 10 INJECTION, SOLUTION EPIDURAL; INFILTRATION; INTRACAUDAL; PERINEURAL at 07:36:00

## 2024-05-28 RX ADMIN — DEXAMETHASONE SODIUM PHOSPHATE 4 MG: 4 MG/ML VIAL (ML) INJECTION at 07:45:00

## 2024-05-28 RX ADMIN — ONDANSETRON 4 MG: 2 INJECTION INTRAMUSCULAR; INTRAVENOUS at 08:24:00

## 2024-05-28 NOTE — ANESTHESIA PREPROCEDURE EVALUATION
PRE-OP EVALUATION    Patient Name: Galileo Villaseñor    Admit Diagnosis: Granulation tissue [L92.9]    Pre-op Diagnosis: Granulation tissue [L92.9]    Excision of skin lesion, granuloma right retroauricular scalp, wound closure with local tissue rearrangement    Anesthesia Procedure: Excision of skin lesion, granuloma right retroauricular scalp, wound closure with local tissue rearrangement    Surgeons and Role:     * Fanny Ackerman MD - Primary     * Viraj Good MD - Assisting Surgeon    Pre-op vitals reviewed.  Temp: 98.3 °F (36.8 °C)  Pulse: 59  Resp: 16  BP: 119/78  SpO2: 98 %  Body mass index is 27.5 kg/m².    Current medications reviewed.  Hospital Medications:   acetaminophen (Tylenol Extra Strength) tab 1,000 mg  1,000 mg Oral Once    scopolamine (Transderm-Scop) 1 MG/3DAYS patch 1 patch  1 patch Transdermal Once    lactated ringers infusion   Intravenous Continuous    ceFAZolin (Ancef) 2g in 10mL IV syringe premix  2 g Intravenous Once       Outpatient Medications:     Medications Prior to Admission   Medication Sig Dispense Refill Last Dose    divalproex  MG Oral Tablet 24 Hr Take 2 tablets (500 mg total) by mouth daily. Take two tablets (500 mg) daily until pt completes surgical procedure on 2024 180 tablet 0 2024    escitalopram 10 MG Oral Tab Take 1 tablet (10 mg total) by mouth daily.   2024    Multiple Vitamins-Minerals (MULTIVITAMIN ADULT EXTRA C OR) Take 1 tablet by mouth daily.   2024    omega-3 fatty acids 1000 MG Oral Cap Take 1,000 mg by mouth daily.   2024    [] Doxycycline Hyclate 100 MG Oral Tab Take 1 tablet (100 mg total) by mouth 2 (two) times daily for 10 days. 20 tablet 0     [] ALPRAZolam 0.5 MG Oral Tab Take 1 tablet (0.5 mg total) by mouth nightly as needed. 90 tablet 1        Allergies: Penicillins      Anesthesia Evaluation    Patient summary reviewed.    Anesthetic Complications  (+) history of anesthetic complications  History of:  PONV       GI/Hepatic/Renal      (+) GERD                           Cardiovascular                                                       Endo/Other                                  Pulmonary                           Neuro/Psych        (+) anxiety       (+) seizures               Sp VPS        Past Surgical History:   Procedure Laterality Date    Brain surgery  2023    Brain surgery  2023    Colonoscopy N/A 2023    Procedure: COLONOSCOPY with retreival of foreign body;  Surgeon: Anibal Hodges DO;  Location: [facility] ENDOSCOPY    Hc implant shunt      HYDROCEPHALUS SHUNTED TWICE    Hernia surgery      Other  2021    VENTRICULOPERITONEAL SHUNT REVISION,  REVISION DISTAL CATHETER ONLY AT RIGHT ANTERIOR CERVICAL REGION (CAREY), Excision of right neck cyst, scar  (Meka)    Other surgical history  , , 2021    Hydracephalus , shunts placed.  revision    Other surgical history N/A 2023    RIGHT OCCIPITAL SHUNT REMOVAL, LEFT  SHUNT EXTERNALIZATION AND  SHUNT TAP    Reconstr nose  1996    Vasectomy       Social History     Socioeconomic History    Marital status:    Tobacco Use    Smoking status: Former     Current packs/day: 0.00     Average packs/day: 0.5 packs/day for 5.0 years (2.5 ttl pk-yrs)     Types: Cigarettes     Start date:      Quit date: 2016     Years since quittin.4    Smokeless tobacco: Never    Tobacco comments:     Passive   Vaping Use    Vaping status: Never Used   Substance and Sexual Activity    Alcohol use: Yes     Alcohol/week: 4.0 standard drinks of alcohol     Types: 4 Cans of beer per week     Comment: SOCIAL    Drug use: No     Comment: smoked cannabis for 4 years passive    Sexual activity: Yes     Partners: Female   Other Topics Concern    Pt has a pacemaker No    Pt has a defibrillator No    Reaction to local anesthetic No    Caffeine Concern Yes     Comment: coffee daily    Exercise Yes     Comment: 2-4 days per week     Seat Belt Yes    Special Diet No    Stress Concern No    Weight Concern No     History   Drug Use No     Comment: smoked cannabis for 4 years passive     Available pre-op labs reviewed.  Lab Results   Component Value Date    WBC 6.1 05/14/2024    RBC 4.86 05/14/2024    HGB 14.8 05/14/2024    HCT 44.1 05/14/2024    MCV 90.7 05/14/2024    MCH 30.5 05/14/2024    MCHC 33.6 05/14/2024    RDW 12.3 05/14/2024    .0 05/14/2024     Lab Results   Component Value Date     05/14/2024    K 4.2 05/14/2024     05/14/2024    CO2 26.0 05/14/2024    BUN 13 05/14/2024    CREATSERUM 0.89 05/14/2024    GLU 86 05/14/2024    CA 9.3 05/14/2024            Airway      Mallampati: II  Mouth opening: 3 FB  TM distance: 4 - 6 cm  Neck ROM: full Cardiovascular      Rhythm: regular  Rate: normal     Dental  Comment: No loose teeth reported by patient           Pulmonary      Breath sounds clear to auscultation bilaterally.               Other findings              ASA: 2   Plan: general  NPO status verified and patient meets guidelines.        Comment: Discussed general anesthesia with endotracheal tube/supraglottic airway with patient. Discussed risk of oral/dental trauma, nausea, rare allergic reactions. Patient understands the risks, benefits, and requirement for anesthesia and willing to proceed. Consent signed.        Plan/risks discussed with: patient                Present on Admission:  **None**

## 2024-05-28 NOTE — ANESTHESIA PROCEDURE NOTES
Airway  Date/Time: 5/28/2024 7:37 AM  Urgency: elective      General Information and Staff    Patient location during procedure: OR  Anesthesiologist: Sohail Cavanaugh MD  Performed: anesthesiologist   Performed by: Sohail Cavanaugh MD  Authorized by: Sohail Cavanaugh MD      Indications and Patient Condition  Indications for airway management: anesthesia  Sedation level: deep  Preoxygenated: yes  Patient position: sniffing  Mask difficulty assessment: 1 - vent by mask    Final Airway Details  Final airway type: endotracheal airway      Successful airway: ETT  Cuffed: yes   Successful intubation technique: direct laryngoscopy  Facilitating devices/methods: anterior pressure/BURP  Endotracheal tube insertion site: oral  Blade: Rachel  Blade size: #3  ETT size (mm): 7.5    Cormack-Lehane Classification: grade I - full view of glottis  Placement verified by: capnometry   Cuff volume (mL): 9  Measured from: lips  ETT to lips (cm): 22  Number of attempts at approach: 1

## 2024-05-28 NOTE — DISCHARGE INSTRUCTIONS
Plastic Surgery Home Care Instructions      We hope you were pleased with your care at [facility].  We wish you the best outcome and overall experience with your operation.  These instructions will help to minimize pain, optimize healing, and improve the likelihood of a successful result.    What To Expect  There may be some spotting of the incision lines for the next few days.  Mild bruising, mild swelling and discomfort in the surgical area is typical.     Bandages (Dressing)  Keep head dressing in place for 24 hrs if tolerated then ok to remove the dressing.   Apply antibiotic ointment (neosporin, bacitracin, etc) daily    Bathing/Showers  You can resume showering tomorrow. Let soap and water run over the incision, don't scrub.   No baths, swimming, or hot tubs until you receive medical permission      Over-The-Counter Medication  You can take Tylenol after surgery for pain relief as needed  You can take Aleve or ibuprofen starting 24 hours after surgery  Take as directed on the bottle    Home Medication  Resume your home medications as instructed  Do not resume herbal medications for two weeks    Diet  Resume your normal diet    Activity  No strenuous activity   You cannot return to physical exercise, sports, or gym workouts until you are allowed to participate in strenuous activity.      Return to Work or School  You can return to work when you are not taking pain medication, if your work does not involve strenuous activity.  Contact the office, if you need a medical note.     Follow-up Appointment   Our office will call you to set up a time    Questions or Concerns  Call Dr. Ackerman's office if you experience bleeding that is not controlled by holding pressure for 20 minutes.     Galileo   Thank you for coming to [facility] for your operation.  The nurses and the anesthesiologist try very hard to make sure you receive the best care possible.  Your trust in them is greatly appreciated.    Thanks so  jose,  Dr. Meka Lowery PA-C

## 2024-05-28 NOTE — BRIEF OP NOTE
Pre-Operative Diagnosis: Granulation tissue [L92.9]     Post-Operative Diagnosis: Granulation tissue [L92.9]      Procedure Performed:   Excision of skin lesion, granuloma right retroauricular scalp, wound closure with local tissue rearrangement    Surgeons and Role:     * Fanny Ackerman MD - Primary     * Viraj Good MD - Assisting Surgeon    Assistant(s):  Surgical Assistant.: Gillian Landon     Surgical Findings:      Specimen:      Estimated Blood Loss: Blood Output: 5 mL (5/28/2024  8:18 AM)      Dictation Number:      Fanny Ackerman MD  5/28/2024  8:40 AM

## 2024-05-28 NOTE — ANESTHESIA POSTPROCEDURE EVALUATION
[facility]    Galileo Villaseñor Patient Status:  Hospital Outpatient Surgery   Age/Gender 43 year old male MRN [id number]   Location [facility] POST ANESTHESIA CARE UNIT Attending Fanny Ackerman MD   Hosp Day # 0 PCP Jessica Beatty DO       Anesthesia Post-op Note    Excision of skin lesion, granuloma right retroauricular scalp, wound closure with local tissue rearrangement    Procedure Summary       Date: 05/28/24 Room / Location:  MAIN OR 06 / [facility] MAIN OR    Anesthesia Start: 0732 Anesthesia Stop: 843    Procedure: Excision of skin lesion, granuloma right retroauricular scalp, wound closure with local tissue rearrangement Diagnosis:       Granulation tissue      (Granulation tissue [L92.9])    Surgeons: Fanny Ackerman MD Anesthesiologist: Sohail Cavanaugh MD    Anesthesia Type: general ASA Status: 2            Anesthesia Type: general    Vitals Value Taken Time   /67 05/28/24 0842   Temp 99.6 05/28/24 0842   Pulse 77 05/28/24 0842   Resp 16 05/28/24 0842   SpO2 97 05/28/24 0842       Patient Location: PACU    Anesthesia Type: general    Airway Patency: patent    Postop Pain Control: adequate    Mental Status: mildly sedated but able to meaningfully participate in the post-anesthesia evaluation    Nausea/Vomiting: none    Cardiopulmonary/Hydration status: stable euvolemic    Complications: no apparent anesthesia related complications    Postop vital signs: stable    Dental Exam: Unchanged from Preop    Patient to be discharged from PACU when criteria met.

## 2024-05-29 ENCOUNTER — APPOINTMENT (OUTPATIENT)
Dept: SPEECH THERAPY | Facility: HOSPITAL | Age: 44
End: 2024-05-29
Attending: NEUROLOGICAL SURGERY
Payer: COMMERCIAL

## 2024-05-29 NOTE — OPERATIVE REPORT
[facility]    PATIENT'S NAME: HOLLIE YIN   ATTENDING PHYSICIAN: Fanny Ackerman MD   OPERATING PHYSICIAN: Fanny Ackerman MD   PATIENT ACCOUNT#:   [id number]    LOCATION:  [facility] 6 ED 10  MEDICAL RECORD #:   [id number]       YOB: 1980  ADMISSION DATE:       05/28/2024      OPERATION DATE:  05/28/2024    OPERATIVE REPORT    PREOPERATIVE DIAGNOSIS:  Right retroauricular wound, granulation tissue, foreign body reaction.  POSTOPERATIVE DIAGNOSIS:  Right retroauricular wound, granulation tissue, foreign body reaction.  PROCEDURE:  Excision and debridement of right retroauricular granulation tissue, sinus tract, with identification of methylene blue, wound reconstruction with local rotation advancement flap 8 sq cm.    ASSISTANT SURGEON:  Viraj Good MD.  Assisted and consulted with the case from a neurosurgical standpoint.    ASSISTANT:  Gillian Landon SA.     ANESTHESIA:  General endotracheal anesthesia.    COMPLICATIONS:  None.    BLOOD LOSS:  Minimal.    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  He was seen in the office preoperatively and failed conservative management with antibiotics and was referred  by the dermatologist.  Therefore, we discussed in conjunction with Dr. Good from Neurosurgery that future excision and wound closure might be indicated for this.  Patient was seen in the office preoperatively several times, and potential risks, complications, benefits, and alternatives were discussed.  Risks and complications included, but were not limited to, infection, bleeding, scarring, damage to surrounding structures, hematoma, seroma, flap failure, wound dehiscence, recurrence of wound issues and sinus tract, need for further surgery.  Patient understands and wishes to proceed.  Questions were  answered.    OPERATIVE TECHNIQUE:  Patient was identified in the preoperative area, informed consent was obtained, and the sites were marked.  Patient was then brought back to the operating room and placed in supine position.  He was adequately padded, and sequential compression devices were applied.  General endotracheal anesthesia was administered.  Perioperative antibiotics were given.  Then, his face, neck, and retroauricular and scalp area were prepped and draped in the usual sterile fashion.  Then, the procedure was started with marking the granulation tissue measuring 2 x 1 cm.  Then, a 15 blade was used with initial incision and then methylene blue was used to inject to the sinus tract to yaneth the extent of the area.  The 15 blade was used to follow the sinus tract and to the deeper scar tissue.  There was no connection to the bone or brain and the sinus tract was removed in its entirety.  It was sent to Pathology.    After complete excision of the granulation through the sinus tracts, post debridement cultures were taken and then a rotation advancement flap was designed.  Lidocaine 1% with epinephrine was injected surrounding the incision site, and then a 15-blade was used to make a curvilinear incision along the scalp area.  Then, the flap was elevated and transferred into the defect.  It was inset with 3-0 Vicryl sutures for the deep dermal layer and 3-0 Prolene mattress sutures for the skin.  Bacitracin ointment, Telfa, and a head dressing were applied.  The area of the local rotation advancement flap was 8 sq cm.  The patient tolerated the procedure well and awoke from anesthesia without difficulty.  All sponge and needle counts were correct at the end of the case.    (Also Job [id number])    Dictated By Fanny Ackerman MD  d: 05/28/2024 17:53:31  t: 05/28/2024 19:55:19  Job [id number]  [facility]/

## 2024-06-03 ENCOUNTER — OFFICE VISIT (OUTPATIENT)
Dept: SPEECH THERAPY | Facility: HOSPITAL | Age: 44
End: 2024-06-03
Attending: NEUROLOGICAL SURGERY
Payer: COMMERCIAL

## 2024-06-03 PROCEDURE — 92507 TX SP LANG VOICE COMM INDIV: CPT

## 2024-06-03 NOTE — PROGRESS NOTES
Diagnosis:   Memory difficulty [R41.3]      Referring Provider: Viraj Good  Date of Evaluation:   4/17/2024    Precautions:   Cognition Next MD visit:   none scheduled  Date of Surgery: n/a   Insurance Primary/Secondary: BCBS OUT OF STATE / N/A       # Auth Visits: 12   Total Timed Treatment: 45 min  Date POC Expires: 7/16/2024  Total Treatment time: 45 min  Charges: 92900   Treatment Number: 10    Subjective: Patient arrived on time to session. Patient participated actively in therapeutic tasks.    Pain: No pain reported on this date. Patient not being seen for pain.    Objective:  Goals: (to be met in 12 visits)   STG 1: Patient will independently verbalize compensatory memory strategies (processing, working memory, short-term memory, attention, problem-solving) and describe 1 use per strategy.  Progress: Patient verbalized understanding of compensatory strategies and external supports given intermittent cueing.     STG 2: Patient will recall novel paragraph length stimuli with 90% accuracy following a 20 minute delay, with min verbal and visual cues for use of compensatory memory strategies.  Progress: Immediate recall (unable to target delayed recall on this date d/t time constraints): 90% given mod visual cues as patient was utilizing notes. Patient has exhibited increased difficulty of recall in previous session given delay and constraints of decreased visual/external supports.     STG 3: Patient will accurately complete working memory tasks within 90% of opportunities given min verbal and visual cueing.  Progress: 90% given mod verbal and visual cueing. Patient increased use of working memory for functional opportunities within structured environment.     STG 4: Patient will utilize accurate planning and note-taking skills within 90% of opportunities given min verbal and visual cueing.  Progress: Patient utilized planning and note-taking skills within 90% of opportunities given mod verbal and visual  cues.     STG 5B: Patient will divide attention between two higher level complexity tasks given less than 1 verbal redirections/reminders.   Progress: Required average of 2 verbal redirections/reminders.     HEP: External memory strategies  Education: Complex encoding of information    Assessment: Patient presents with cognitive deficits within the following areas: memory (working, recall), attention, and executive functioning. Patient's deficits impact his ability to transition to work tasks of increased complexity and follow through with home tasks. Patient continues to require skilled cueing to support carryover of skills to non-structured tasks and tasks of increased complexity. Patient is receptive to training/cueing and has increased use of skills for session tasks. Continued intervention is medically necessary to support generalization.       Plan: Continue speech-language therapy targeting cognitive-communication.

## 2024-06-05 ENCOUNTER — OFFICE VISIT (OUTPATIENT)
Dept: SPEECH THERAPY | Facility: HOSPITAL | Age: 44
End: 2024-06-05
Attending: NEUROLOGICAL SURGERY
Payer: COMMERCIAL

## 2024-06-05 PROCEDURE — 92507 TX SP LANG VOICE COMM INDIV: CPT

## 2024-06-10 ENCOUNTER — OFFICE VISIT (OUTPATIENT)
Dept: SURGERY | Facility: CLINIC | Age: 44
End: 2024-06-10
Payer: COMMERCIAL

## 2024-06-10 ENCOUNTER — OFFICE VISIT (OUTPATIENT)
Dept: SPEECH THERAPY | Facility: HOSPITAL | Age: 44
End: 2024-06-10
Attending: NEUROLOGICAL SURGERY
Payer: COMMERCIAL

## 2024-06-10 DIAGNOSIS — L90.5 SCAR OF NECK: ICD-10-CM

## 2024-06-10 DIAGNOSIS — L92.9 GRANULATION TISSUE: Primary | ICD-10-CM

## 2024-06-10 PROCEDURE — 99024 POSTOP FOLLOW-UP VISIT: CPT | Performed by: PHYSICIAN ASSISTANT

## 2024-06-10 PROCEDURE — 92507 TX SP LANG VOICE COMM INDIV: CPT

## 2024-06-10 NOTE — PROGRESS NOTES
Galileo Villaseñor is a 43 year old male who presents today for a follow-up after excision and debridement of right retroauricular granulation tissue, sinus tract, with wound reconstruction with local rotation advancement flap on 5/28/2024 with Dr. Ackerman.   He denies fever and chills. He denies nausea, vomiting, diarrhea or constipation.   His pain is controlled.  He is here today for wound check and suture removal.     Pathology from 5/28/24:   Skin, right retroauricular, excision:  -Skin with ulceration, granulation tissue, sinus tract formation, and foreign body-type histiocytes with polarizable material.  -No evidence of malignancy.      Physical Exam     HEENT: Right scalp incision clean, dry, and intact. Prolene sutures in place. No wound drainage or wound dehiscence. No surrounding erythema or ecchymosis. Flap viable.  No hematoma/seroma.    There were no vitals filed for this visit.      Assessment and Plan     Galileo Villaseñor is doing well s/p excision and debridement of right retroauricular granulation tissue, sinus tract, with wound reconstruction with local rotation advancement flap on 5/28/2024 with Dr. Ackerman.     Prolene sutures were removed today. The patient tolerated this well. Neosporin was applied to the incision. He will apply this daily for the next week and then can use aquaphor/vaseline as needed. He can continue showering. He should continue to avoid bathing and swimming until the incision is fully healed. He will continue with activity restrictions until he sees Dr. Ackerman.    He will follow up with Dr. Ackerman on 7/17/24 for scar check. He will call our office with any questions or concerns.     Questions were answered. Patient understands.     JOSIANE Hurd  6/10/2024  9:54 AM

## 2024-06-10 NOTE — PROGRESS NOTES
Diagnosis:   Memory difficulty [R41.3]      Referring Provider: Viraj Good  Date of Evaluation:   4/17/2024    Precautions:   Cognition Next MD visit:   none scheduled  Date of Surgery: n/a   Insurance Primary/Secondary: BCBS OUT OF STATE / N/A       # Visits on POC: 20   Total Timed Treatment: 45 min  Date POC Expires: 9/8/2024  Total Treatment time: 45 min  Charges: [id number]   Treatment Number: 12     Progress Summary  Pt has attended 12 visits in Speech Therapy.     Dear Dr. Good,  This letter is to inform you of Galileo Villaseñor's progress in speech-language therapy.    Since his initial evaluation, Galileo has attended 12 sessions. Therapy sessions have targeted cognitive-communication. A home exercise program (HEP) addressing carryover of strategies has been provided and completed consistently. During this progress period, Galileo has demonstrated improved use of external supports. While Galileo has progressed, he continues to require skilled intervention to support working memory and attention. Therefore, it is recommended that speech-language therapy continue to address cognitive-communication skills. .    Subjective: Patient arrived on time to session. Patient participated actively in therapeutic tasks.    Pain: No pain reported on this date. Patient not being seen for pain.    Objective:  Goals: (to be met in 8 additional visits)   STG 1: Patient will independently verbalize compensatory memory strategies (processing, working memory, short-term memory, attention, problem-solving) and describe 1 use per strategy.  Progress: Patient verbalized understanding of compensatory strategies and external supports given intermittent cueing.     STG 2: Patient will recall novel paragraph length stimuli with 90% accuracy following a 20 minute delay, with min verbal and visual cues for use of compensatory memory strategies.  Progress: 85% following 15 minute delay given mod visual cues as patient was utilizing notes. Patient  continues to benefit from cueing to support complex encoding of information.     STG 3: Patient will accurately complete working memory tasks within 90% of opportunities given min verbal and visual cueing.  Progress: 90% given mod verbal and visual cueing. Patient increased use of working memory for functional opportunities within structured environment. Given external distractions and non-structured environments, patient demonstrates increased difficulty.     STG 4: Patient will utilize accurate planning and note-taking skills within 90% of opportunities given min verbal and visual cueing.  Progress: Patient utilized planning and note-taking skills within 90% of opportunities given min verbal and visual cues. Goal met; to gauge carryover in next session.    STG 5B: Patient will divide attention between two higher level complexity tasks given less than 1 verbal redirections/reminders.   Progress: Required average of 2 verbal redirections/reminders.     HEP: External memory strategies  Education: Complex encoding of information    Assessment: Patient presents with cognitive deficits within the following areas: memory (working, recall), attention, and executive functioning. Patient's deficits impact his ability to transition to work tasks of increased complexity and follow through with home tasks. Patient has demonstrated progress toward goals. Patient continues to require skilled intervention to support carryover with skills within non-structured environments. Continued intervention is medically necessary.       Plan: Continue skilled Speech Therapy 1-2x/week or a total of 8 additional visits over a 90 day period. Treatment will include: cognitive therapy       Patient/Family/Caregiver was advised of these findings, precautions, and treatment options and has agreed to actively participate in planning and for this course of care.    Thank you for your referral. If you have any questions, please contact me at Dept:  [phone].    Sincerely,  Electronically signed by therapist: YONG Johnson     Physician's certification required:  Yes  Please co-sign or sign and return this letter via fax as soon as possible to [phone].   I certify the need for these services furnished under this plan of treatment and while under my care.    X___________________________________________________ Date____________________    Certification From: 6/10/2024  To:9/8/2024

## 2024-06-11 ENCOUNTER — APPOINTMENT (OUTPATIENT)
Dept: GENERAL RADIOLOGY | Age: 44
End: 2024-06-11
Attending: PHYSICIAN ASSISTANT
Payer: COMMERCIAL

## 2024-06-11 ENCOUNTER — TELEPHONE (OUTPATIENT)
Dept: SURGERY | Facility: CLINIC | Age: 44
End: 2024-06-11

## 2024-06-11 ENCOUNTER — HOSPITAL ENCOUNTER (EMERGENCY)
Age: 44
Discharge: HOME | End: 2024-06-11
Payer: COMMERCIAL

## 2024-06-11 VITALS
OXYGEN SATURATION: 97 % | HEIGHT: 66 IN | TEMPERATURE: 97 F | DIASTOLIC BLOOD PRESSURE: 71 MMHG | HEART RATE: 60 BPM | BODY MASS INDEX: 27.32 KG/M2 | RESPIRATION RATE: 12 BRPM | WEIGHT: 170 LBS | SYSTOLIC BLOOD PRESSURE: 122 MMHG

## 2024-06-11 DIAGNOSIS — S61.211A LACERATION OF LEFT INDEX FINGER WITHOUT FOREIGN BODY WITHOUT DAMAGE TO NAIL, INITIAL ENCOUNTER: Primary | ICD-10-CM

## 2024-06-11 PROCEDURE — 12001 RPR S/N/AX/GEN/TRNK 2.5CM/<: CPT

## 2024-06-11 PROCEDURE — 99283 EMERGENCY DEPT VISIT LOW MDM: CPT

## 2024-06-11 PROCEDURE — 73140 X-RAY EXAM OF FINGER(S): CPT | Performed by: PHYSICIAN ASSISTANT

## 2024-06-11 NOTE — TELEPHONE ENCOUNTER
I called patient to review pathology and culture results from surgery on 5/28/2024.     Final Diagnosis:   Skin, right retroauricular, excision:  -Skin with ulceration, granulation tissue, sinus tract formation, and foreign body-type histiocytes with polarizable material.  -No evidence of malignancy.     Anaerobic Culture 1+ growth Cutibacterium (Proprionibacterium) acnes Abnormal           Case discussed with Andrew Hicks, pharmacy infectious disease specialist, who did not recommend any treatment. Discussed with Dr. Ackerman and patient.    Answered patients questions. Patient was appreciative of the call.

## 2024-06-11 NOTE — ED PROVIDER NOTES
Patient Seen in: [facility] [place]      History     Chief Complaint   Patient presents with    Laceration/Abrasion     Stated Complaint: finger lac    Subjective:   HPI    Pleasant 43-year-old gentleman.  Right-hand-dominant.  Patient sustained a laceration to his left index finger just prior to arrival while utilizing a hedge tremor.  He is unsure of tetanus status.  No active bleeding.  Maintains full range of motion.  Slightly diminished sensation to the distal pad    Objective:   Past Medical History:    Abdominal pain    ALCOHOL USE    Alcoholism on both sides    Allergic rhinitis    Anxiety    After performing the test asked by dr amezcua i had an anxiety    Anxiety state    Bloating    Diarrhea, unspecified    Enlarged prostate    Flatulence/gas pain/belching    Frequent urination    Heartburn    Hydrocephalus (HCC)    Indigestion    Nodule of right lung    PONV (postoperative nausea and vomiting)    S/P  shunt    REMOVED LAST YEAR- 2023 FOLLOWING INFECTION    Seizure (HCC)    Seizure disorder (HCC)    Sleep disturbance    Stress    Visual impairment    glasses    Wears glasses              Past Surgical History:   Procedure Laterality Date    Brain surgery  06/13/2023    Brain surgery  06/20/2023    Colonoscopy N/A 06/20/2023    Procedure: COLONOSCOPY with retreival of foreign body;  Surgeon: Anibal Hodges DO;  Location:  ENDOSCOPY    Hc implant shunt      HYDROCEPHALUS SHUNTED TWICE    Hernia surgery  1983    Other  11/08/2021    VENTRICULOPERITONEAL SHUNT REVISION,  REVISION DISTAL CATHETER ONLY AT RIGHT ANTERIOR CERVICAL REGION (CAREY), Excision of right neck cyst, scar  (Meka)    Other surgical history  1983, 1987, 11/8/2021    Hydracephalus , shunts placed. 11/8 revision    Other surgical history N/A 06/12/2023    RIGHT OCCIPITAL SHUNT REMOVAL, LEFT  SHUNT EXTERNALIZATION AND  SHUNT TAP    Reconstr nose  1996    Vasectomy  2021                Social History      Socioeconomic History    Marital status:    Tobacco Use    Smoking status: Former     Current packs/day: 0.00     Average packs/day: 0.5 packs/day for 5.0 years (2.5 ttl pk-yrs)     Types: Cigarettes     Start date:      Quit date: 2016     Years since quittin.4    Smokeless tobacco: Never    Tobacco comments:     Passive   Vaping Use    Vaping status: Never Used   Substance and Sexual Activity    Alcohol use: Yes     Alcohol/week: 4.0 standard drinks of alcohol     Types: 4 Cans of beer per week     Comment: SOCIAL    Drug use: No     Comment: smoked cannabis for 4 years passive    Sexual activity: Yes     Partners: Female   Other Topics Concern    Pt has a pacemaker No    Pt has a defibrillator No    Reaction to local anesthetic No    Caffeine Concern Yes     Comment: coffee daily    Exercise Yes     Comment: 2-4 days per week    Seat Belt Yes    Special Diet No    Stress Concern No    Weight Concern No     Social Determinants of Health     Food Insecurity: Low Risk  (2023)    [facility]    Food Insecurity     Have there been times that your food ran out, and you didn't have money to get more?: No     Are there times that you worry that this might happen?: No   Transportation Needs: Low Risk  (2023)    [facility]    Transportation Needs     Do you have trouble getting transportation to medical appointments?: No              Review of Systems    Positive for stated complaint: finger lac  Other systems are as noted in HPI.  Constitutional and vital signs reviewed.      All other systems reviewed and negative except as noted above.    Physical Exam     ED Triage Vitals [24 1328]   /71   Pulse 60   Resp 12   Temp 96.7 °F (35.9 °C)   Temp src Temporal   SpO2 97 %   O2 Device None (Room air)       Current Vitals:   Vital Signs  BP:  122/71  Pulse: 60  Resp: 12  Temp: 96.7 °F (35.9 °C)  Temp src: Temporal    Oxygen Therapy  SpO2: 97 %  O2 Device: None (Room air)            Physical Exam    Gen: Well appearing, well groomed, alert and aware x 3  Neck: Supple, full range of motion  Eye examination: EOMs are intact, normal conjunctival  ENT: Atraumatic  Lung: No distress, RR, no retraction  Extremities: Laceration to the left index finger, lateral aspect overlying the DIP joint.  Normal cap refill.  Patient reports diminished sensation distally.  Flecked range of motion.  Explored.  No visualization of tendon    ED Course   Labs Reviewed - No data to display                [facility]      Medical record reviewed.  Patient last had a tetanus shot in 2022    Plain film x-ray to rule out underlying fracture    Using 1% plain lidocaine, local injection was performed.  Site was then thoroughly irrigated.  Will plan on primary closure with 5-0 nylon      Site sterilized.  Using 5-0 nylon, 3 simple erupted sutures were placed.  Well-approximated    Suture removal in 7 to 10 days.  Plan film x-rays performed with no acute bony abnormality                             Medical Decision Making      Disposition and Plan     Clinical Impression:  1. Laceration of left index finger without foreign body without damage to nail, initial encounter         Disposition:  There is no disposition on file for this visit.  There is no disposition time on file for this visit.    Follow-up:  Jessica Beatty DO  [address]  [facility] [address]  [facility] [address]  [phone]    Follow up            Medications Prescribed:  Current Discharge Medication List

## 2024-06-12 ENCOUNTER — NURSE ONLY (OUTPATIENT)
Dept: ELECTROPHYSIOLOGY | Facility: HOSPITAL | Age: 44
End: 2024-06-12
Attending: Other
Payer: COMMERCIAL

## 2024-06-12 ENCOUNTER — OFFICE VISIT (OUTPATIENT)
Dept: SPEECH THERAPY | Facility: HOSPITAL | Age: 44
End: 2024-06-12
Attending: NEUROLOGICAL SURGERY
Payer: COMMERCIAL

## 2024-06-12 DIAGNOSIS — R56.9 SEIZURES (HCC): ICD-10-CM

## 2024-06-12 PROCEDURE — 92507 TX SP LANG VOICE COMM INDIV: CPT

## 2024-06-12 PROCEDURE — 95816 EEG AWAKE AND DROWSY: CPT

## 2024-06-12 NOTE — PROGRESS NOTES
Diagnosis:   Memory difficulty [R41.3]      Referring Provider: Viraj Good  Date of Evaluation:   4/17/2024    Precautions:   Cognition Next MD visit:   none scheduled  Date of Surgery: n/a   Insurance Primary/Secondary: BCBS OUT OF STATE / N/A       # Visits on POC: 20   Total Timed Treatment: 45 min  Date POC Expires: 9/8/2024  Total Treatment time: 45 min  Charges: [id number]   Treatment Number: 13    Subjective: Patient arrived on time to session. Patient participated actively in therapeutic tasks. Patient reports only sleeping for 4 hours for EEG this morning.     Pain: No pain reported on this date. Patient not being seen for pain.    Objective:  Goals: (to be met in 8 additional visits)   STG 1: Patient will independently verbalize compensatory memory strategies (processing, working memory, short-term memory, attention, problem-solving) and describe 1 use per strategy.  Progress: Patient verbalized understanding of compensatory strategies and external supports given intermittent cueing.     STG 2: Patient will recall novel paragraph length stimuli with 90% accuracy following a 20 minute delay, with min verbal and visual cues for use of compensatory memory strategies.  Progress: 85% following 15 minute delay given min verbal cues. Patient able to utilize note-taking to support complex encoding of information and tolerated fading of visual support.     STG 3: Patient will accurately complete working memory tasks within 90% of opportunities given min verbal and visual cueing.  Progress: 90% given mod verbal and visual cueing. Patient benefited from repetition of stimuli and increased accuracy given opportunities for practice.     STG 4: Patient will utilize accurate planning and note-taking skills within 90% of opportunities given min verbal and visual cueing.  Progress: Patient utilized planning and note-taking skills within 90% of opportunities given min verbal and visual cues. Goal met; see updated goal.    STG 4B: Patient will utilize accurate planning and note-taking skills within 90% of opportunities independently.    STG 5B: Patient will divide attention between two higher level complexity tasks given less than 1 verbal redirections/reminders.  Progress: Required average of 2 verbal redirections/reminders.     HEP: Home working memory tasks  Education: Use of compensatory strategies    Assessment: Patient presents with cognitive deficits within the following areas: memory (working, recall), attention, and executive functioning. Patient's deficits impact his ability to transition to work tasks of increased complexity and follow through with home tasks. Patient continues to benefit from skilled cueing to support use of strategies within non-structured tasks. Continued intervention is medically necessary.       Plan: Continue speech-language therapy targeting cognitive communication.

## 2024-06-14 ENCOUNTER — TELEPHONE (OUTPATIENT)
Dept: SURGERY | Facility: CLINIC | Age: 44
End: 2024-06-14

## 2024-06-17 ENCOUNTER — OFFICE VISIT (OUTPATIENT)
Dept: SPEECH THERAPY | Facility: HOSPITAL | Age: 44
End: 2024-06-17
Attending: NEUROLOGICAL SURGERY
Payer: COMMERCIAL

## 2024-06-17 PROCEDURE — 92507 TX SP LANG VOICE COMM INDIV: CPT

## 2024-06-17 NOTE — PROGRESS NOTES
Diagnosis:   Memory difficulty [R41.3]      Referring Provider: Viraj Good  Date of Evaluation:   4/17/2024    Precautions:   Cognition Next MD visit:   none scheduled  Date of Surgery: n/a   Insurance Primary/Secondary: BCBS OUT OF STATE / N/A       # Visits on POC: 20   Total Timed Treatment: 40 min  Date POC Expires: 9/8/2024  Total Treatment time: 40 min  Charges: [id number]   Treatment Number: 14    Subjective: Patient arrived on time to session. Patient participated actively in therapeutic tasks.     Pain: No pain reported on this date. Patient not being seen for pain.    Objective:  Goals: (to be met in 8 additional visits)   STG 1: Patient will independently verbalize compensatory memory strategies (processing, working memory, short-term memory, attention, problem-solving) and describe 1 use per strategy.  Progress: Patient verbalized understanding of compensatory strategies and external supports independently. Goal met; to gauge carryover in next session.    STG 2: Patient will recall novel paragraph length stimuli with 90% accuracy following a 20 minute delay, with min verbal and visual cues for use of compensatory memory strategies.  Progress: 85% following 20 minute delay given mod verbal cues.     STG 3: Patient will accurately complete working memory tasks within 90% of opportunities given min verbal and visual cueing.  Progress: 90% given mod verbal and visual cueing. Patient benefited from repetition of stimuli and increased accuracy given opportunities for practice.     STG 4B: Patient will utilize accurate planning and note-taking skills within 90% of opportunities independently.  Progress: Patient utilized planning and note-taking skills within 90% of opportunities given min verbal and visual cues.    STG 5B: Patient will divide attention between two higher level complexity tasks given less than 1 verbal redirections/reminders.  Progress: Required average of 2 verbal redirections/reminders.      HEP: Home working memory tasks  Education: Use of compensatory strategies    Assessment: Patient presents with cognitive deficits within the following areas: memory (working, recall), attention, and executive functioning. Patient's deficits impact his ability to transition to work tasks of increased complexity and follow through with home tasks. Patient demonstrates continued progress toward goals. Patient continues to require repetition and skilled cueing to support carryover with tasks of increased complexity and non-structured settings. Continued intervention is medically necessary.       Plan: Continue speech-language therapy targeting cognitive communication.

## 2024-06-17 NOTE — PROCEDURES
[facility] - ELECTROENCEPHALOGRAM (EEG) REPORT  Patient Name:  Galileo Villaseñor   MRN / CSN:  [id number] / [id number]   Date of Birth / Age:  7/13/1980 /  43 year old   Encounter Date:  6/12/24         METHODS:  Twenty-two electrodes were applied according to the 10-20-electrode placement system on this extended audio-video EEG. EKG monitoring, monopolar and bipolar montages are routinely utilized. The record was obtained on a digital system.      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    The EEG was requested to assess for epileptiform activity and change in mental status.     State(s) of consciousness: awake and drowsy    Relevant medications:       FINDINGS:  1) Background: A posterior-dominant background rhythm of 10-12 Hz with an amplitude of 20-30 microvolts is seen.  2) Sleep: No sleep complexes were noted.  3) Abnormalities:  None  4) Activation:                    HV:  performed.                    IPS:  performed.    IMPRESSION:  This EEG is a normal study recorded in the awake and asleep states. No focal, lateralizing, or epileptiform activity is seen and no seizures are recorded.     Report covers  Start 6/12/24 at 0713  End 6/12/24 at 0823    SIGNATURES:  Bg Bowens D.O.  [facility]

## 2024-06-24 ENCOUNTER — OFFICE VISIT (OUTPATIENT)
Dept: SPEECH THERAPY | Facility: HOSPITAL | Age: 44
End: 2024-06-24
Attending: NEUROLOGICAL SURGERY

## 2024-06-24 PROCEDURE — 92507 TX SP LANG VOICE COMM INDIV: CPT

## 2024-06-24 NOTE — PROGRESS NOTES
Diagnosis:   Memory difficulty [R41.3]      Referring Provider: Viraj Good  Date of Evaluation:   4/17/2024    Precautions:   Cognition Next MD visit:   none scheduled  Date of Surgery: n/a   Insurance Primary/Secondary: BCBS OUT OF STATE / N/A       # Visits on POC: 20   Total Timed Treatment: 45 min  Date POC Expires: 9/8/2024  Total Treatment time: 45 min  Charges: [id number]   Treatment Number: 15    Subjective: Patient arrived on time to session. Patient participated actively in therapeutic tasks.     Pain: No pain reported on this date. Patient not being seen for pain.    Objective:  Goals: (to be met in 8 additional visits)   STG 1: Patient will independently verbalize compensatory memory strategies (processing, working memory, short-term memory, attention, problem-solving) and describe 1 use per strategy.  Progress: Patient verbalized understanding of compensatory strategies and external supports independently. Goal met; to gauge carryover in next sessions.    STG 2: Patient will recall novel paragraph length stimuli with 90% accuracy following a 20 minute delay, with min verbal and visual cues for use of compensatory memory strategies.  Progress: Goal not targeted on this date. Data reflects progress from previous session. 85% following 20 minute delay given mod verbal cues.     STG 3: Patient will accurately complete working memory tasks within 90% of opportunities given min verbal and visual cueing.  Progress: 90% given mod verbal and visual cueing. Patient required increased support on this date to engage working memory for tasks of increased complexity.     STG 4B: Patient will utilize accurate planning and note-taking skills within 90% of opportunities independently.  Progress: Patient utilized planning and note-taking skills within 90% of opportunities given min verbal and visual cues.    STG 5B: Patient will divide attention between two higher level complexity tasks given less than 1 verbal  redirections/reminders.  Progress: Required average of 2 verbal redirections/reminders.     HEP: Home working memory tasks  Education: Use of compensatory strategies    Assessment: Patient presents with cognitive deficits within the following areas: memory (working, recall), attention, and executive functioning. Patient's deficits impact his ability to transition to work tasks of increased complexity and follow through with home tasks. Patient required support to engage use of strategies for functional opportunities within new work related tasks. Patient benefited from discussion and generation of external strategies for new job tasks. Continued intervention is medically necessary.       Plan: Continue speech-language therapy targeting cognitive communication.

## 2024-06-28 ENCOUNTER — TELEPHONE (OUTPATIENT)
Dept: NEUROLOGY | Facility: CLINIC | Age: 44
End: 2024-06-28

## 2024-07-01 ENCOUNTER — OFFICE VISIT (OUTPATIENT)
Dept: SPEECH THERAPY | Facility: HOSPITAL | Age: 44
End: 2024-07-01
Attending: NEUROLOGICAL SURGERY
Payer: COMMERCIAL

## 2024-07-01 PROCEDURE — 92507 TX SP LANG VOICE COMM INDIV: CPT

## 2024-07-01 NOTE — PROGRESS NOTES
Diagnosis:   Memory difficulty [R41.3]      Referring Provider: Viraj Good  Date of Evaluation:   4/17/2024    Precautions:   Cognition Next MD visit:   none scheduled  Date of Surgery: n/a   Insurance Primary/Secondary: BCBS OUT OF STATE / N/A       # Visits on POC: 20   Total Timed Treatment: 45 min  Date POC Expires: 9/8/2024  Total Treatment time: 45 min  Charges: [id number]   Treatment Number: 16    Subjective: Patient arrived on time to session. Patient participated actively in therapeutic tasks. Patient reports that external supports are helping with work related tasks.     Pain: No pain reported on this date. Patient not being seen for pain.    Objective:  Goals: (to be met in 8 additional visits)   STG 1: Patient will independently verbalize compensatory memory strategies (processing, working memory, short-term memory, attention, problem-solving) and describe 1 use per strategy.  Progress: Patient verbalized understanding of compensatory strategies and external supports independently. Goal met; to gauge carryover in next sessions.    STG 2: Patient will recall novel paragraph length stimuli with 90% accuracy following a 20 minute delay, with min verbal and visual cues for use of compensatory memory strategies.  Progress: 85% following 20 minute delay given min verbal cues.     STG 3: Patient will accurately complete working memory tasks within 90% of opportunities given min verbal and visual cueing.  Progress: 90% given min verbal and visual cueing. Patient increased accuracy given opportunities to practice and discussion of supports. Goal met; to gauge carryover in next session.    STG 4B: Patient will utilize accurate planning and note-taking skills within 90% of opportunities independently.  Progress: Patient utilized planning and note-taking skills within 90% of opportunities independently. Goal met; to gauge carryover in next sessions.    STG 5B: Patient will divide attention between two higher level  complexity tasks given less than 1 verbal redirections/reminders.  Progress: Required average of 2 verbal redirections/reminders.     HEP: Home working memory tasks  Education: Use of compensatory strategies    Assessment: Patient presents with cognitive deficits within the following areas: memory (working, recall), attention, and executive functioning. Patient's deficits impact his ability to transition to work tasks of increased complexity and follow through with home tasks. Patient continues to demonstrate progress toward goals and increased independence with use of compensatory strategies and external supports. Patient benefits from opportunities to practice. Further intervention is medically necessary to support generalization and carryover of skills.       Plan: Continue speech-language therapy targeting cognitive communication.

## 2024-07-08 ENCOUNTER — OFFICE VISIT (OUTPATIENT)
Dept: SPEECH THERAPY | Facility: HOSPITAL | Age: 44
End: 2024-07-08
Attending: NEUROLOGICAL SURGERY
Payer: COMMERCIAL

## 2024-07-08 PROCEDURE — 92507 TX SP LANG VOICE COMM INDIV: CPT

## 2024-07-15 ENCOUNTER — APPOINTMENT (OUTPATIENT)
Dept: SPEECH THERAPY | Facility: HOSPITAL | Age: 44
End: 2024-07-15
Attending: NEUROLOGICAL SURGERY
Payer: COMMERCIAL

## 2024-07-15 NOTE — PROGRESS NOTES
Galileo Villaseñor is a 44 year old male who presents today for a follow-up after excision and debridement of right retroauricular granulation tissue, sinus tract, with wound reconstruction with local rotation advancement flap on 5/28/2024.  He denies fever and chills. He denies nausea, vomiting, diarrhea or constipation.   His pain is controlled.  He is here today for scar check.     Pathology from 5/28/24:   Skin, right retroauricular, excision:  -Skin with ulceration, granulation tissue, sinus tract formation, and foreign body-type histiocytes with polarizable material.  -No evidence of malignancy.      Physical Exam     HEENT: Right scalp incision clean, dry, and intact. No wound drainage or wound dehiscence. No surrounding erythema or ecchymosis. No hematoma/seroma. Flap viable.       There were no vitals filed for this visit.      Assessment and Plan     Galileo Villaseñor is doing well s/p excision and debridement of right retroauricular granulation tissue, sinus tract, with wound reconstruction with local rotation advancement flap on 5/28/2024.    The incision is healing well. We reviewed options for scar management including vitamin E oil, silicone products, scar massage and sun protection/sun block. He may now resume swimming and bathing.  He has no activity restrictions from our standpoint.     He will follow up as needed. He will call with any questions or concerns.     Questions were answered. Patient understands.   
97.3

## 2024-07-17 ENCOUNTER — OFFICE VISIT (OUTPATIENT)
Dept: SURGERY | Facility: CLINIC | Age: 44
End: 2024-07-17
Payer: COMMERCIAL

## 2024-07-17 DIAGNOSIS — L92.9 GRANULATION TISSUE: Primary | ICD-10-CM

## 2024-07-17 PROCEDURE — 99024 POSTOP FOLLOW-UP VISIT: CPT | Performed by: SURGERY

## 2024-07-24 ENCOUNTER — OFFICE VISIT (OUTPATIENT)
Dept: SPEECH THERAPY | Facility: HOSPITAL | Age: 44
End: 2024-07-24
Attending: NEUROLOGICAL SURGERY
Payer: COMMERCIAL

## 2024-07-24 PROCEDURE — 92507 TX SP LANG VOICE COMM INDIV: CPT

## 2024-07-24 NOTE — PROGRESS NOTES
Diagnosis:   Memory difficulty [R41.3]      Referring Provider: Viraj Good  Date of Evaluation:   4/17/2024    Precautions:   Cognition Next MD visit:   none scheduled  Date of Surgery: n/a   Insurance Primary/Secondary: BCBS OUT OF STATE / N/A       # Visits on POC: 20   Total Timed Treatment: 45 min  Date POC Expires: 9/8/2024  Total Treatment time: 45 min  Charges: [id number]   Treatment Number: 18    Subjective: Patient arrived on time to session. Patient participated actively in therapeutic tasks.     Pain: No pain reported on this date. Patient not being seen for pain.    Objective:  Goals: (to be met in 8 additional visits)   STG 1: Patient will independently verbalize compensatory memory strategies (processing, working memory, short-term memory, attention, problem-solving) and describe 1 use per strategy.  Progress: Patient verbalized understanding of compensatory strategies and external supports independently. Goal met; to gauge carryover in next sessions.    STG 2: Patient will recall novel paragraph length stimuli with 90% accuracy following a 20 minute delay, with min verbal and visual cues for use of compensatory memory strategies.  Progress: 85% following 20 minute delay given min verbal cues. Patient continues to require cueing to engage use of recall for specific details (e.g., dates and locations).    STG 3: Patient will accurately complete working memory tasks within 90% of opportunities given min verbal and visual cueing.  Progress: 90% given min verbal and visual cueing. Patient increased accuracy given opportunities to practice and discussion of supports. Goal met; to gauge carryover in next sessions.    STG 4B: Patient will utilize accurate planning and note-taking skills within 90% of opportunities independently.  Progress: Patient utilized planning and note-taking skills within 90% of opportunities independently. Goal met; to gauge carryover in next sessions.    STG 5B: Patient will divide  attention between two higher level complexity tasks given less than 1 verbal redirections/reminders.  Progress: Required average of 1 verbal redirections/reminders. Goal met; to gauge carryover in next session.    HEP: Home working memory tasks  Education: Use of compensatory strategies    Assessment: Patient presents with cognitive deficits within the following areas: memory (working, recall), attention, and executive functioning. Patient's deficits impact his ability to transition to work tasks of increased complexity and follow through with home tasks. Patient demonstrates increased independence with use of compensatory strategies. Patient continues to benefit from cueing and opportunities to practice use of organization/problem solving systems for tasks of increased complexity. Patient will benefit from 2 follow up sessions to support generalization. Continued intervention is medically necessary.       Plan: Continue speech-language therapy targeting cognitive communication.

## 2024-08-07 ENCOUNTER — OFFICE VISIT (OUTPATIENT)
Dept: SPEECH THERAPY | Facility: HOSPITAL | Age: 44
End: 2024-08-07
Attending: NEUROLOGICAL SURGERY
Payer: COMMERCIAL

## 2024-08-07 PROCEDURE — 92507 TX SP LANG VOICE COMM INDIV: CPT

## 2024-08-07 NOTE — PROGRESS NOTES
Diagnosis:   Memory difficulty [R41.3]      Referring Provider: Viraj Good  Date of Evaluation:   4/17/2024    Precautions:   Cognition Next MD visit:   none scheduled  Date of Surgery: n/a   Insurance Primary/Secondary: BCBS OUT OF STATE / N/A       # Visits on POC: 20   Total Timed Treatment: 45 min  Date POC Expires: 9/8/2024  Total Treatment time: 45 min  Charges: [id number]   Treatment Number: 19    Subjective: Patient arrived on time to session. Patient participated actively in therapeutic tasks.     Pain: No pain reported on this date. Patient not being seen for pain.    Objective:  Goals: (to be met in 8 additional visits)   STG 1: Patient will independently verbalize compensatory memory strategies (processing, working memory, short-term memory, attention, problem-solving) and describe 1 use per strategy.  Progress: Patient verbalized understanding of compensatory strategies and external supports independently. Goal met; to gauge carryover in next sessions.    STG 2: Patient will recall novel paragraph length stimuli with 90% accuracy following a 20 minute delay, with min verbal and visual cues for use of compensatory memory strategies.  Progress: 90% following 20 minute delay given min verbal cues. Goal met; to gauge carryover in next session.    STG 3: Patient will accurately complete working memory tasks within 90% of opportunities given min verbal and visual cueing.  Progress: 90% given min verbal and visual cueing. Patient increased accuracy given opportunities to practice and discussion of supports. Goal met; to gauge carryover in next sessions.    STG 4B: Patient will utilize accurate planning and note-taking skills within 90% of opportunities independently.  Progress: Patient utilized planning and note-taking skills within 90% of opportunities independently. Goal met; to gauge carryover in next sessions.    STG 5B: Patient will divide attention between two higher level complexity tasks given less  than 1 verbal redirections/reminders.  Progress: Required average of 1 verbal redirections/reminders. Goal met; to gauge carryover in next session.    HEP: Home working memory tasks  Education: Use of compensatory strategies    Assessment: Patient presents with cognitive deficits within the following areas: memory (working, recall), attention, and executive functioning. Patient's deficits impact his ability to transition to work tasks of increased complexity and follow through with home tasks. Patient demonstrates significant progress toward goals. Patient is able to demonstrate use of compensatory strategies (internal and external supports) within functional home activities. Patient will benefit from 1 final d/c session.      Plan: Continue speech-language therapy targeting cognitive communication. Patient to have 1 final d/c session.

## 2024-08-21 ENCOUNTER — OFFICE VISIT (OUTPATIENT)
Dept: SPEECH THERAPY | Facility: HOSPITAL | Age: 44
End: 2024-08-21
Attending: NEUROLOGICAL SURGERY
Payer: COMMERCIAL

## 2024-08-21 PROCEDURE — 92507 TX SP LANG VOICE COMM INDIV: CPT

## 2024-08-21 NOTE — PROGRESS NOTES
Diagnosis:   Memory difficulty [R41.3]      Referring Provider: Viraj Good  Date of Evaluation:   4/17/2024    Precautions:   Cognition Next MD visit:   none scheduled  Date of Surgery: n/a   Insurance Primary/Secondary: BCBS OUT OF STATE / N/A       # Visits on POC: 20   Total Timed Treatment: 45 min  Date POC Expires: 9/8/2024  Total Treatment time: 45 min  Charges: [id number]   Treatment Number: 20     Discharge Summary  Pt has attended 20 visits in Speech Therapy.     Dear Dr. Good,  This letter is to inform you of Galileo Villaseñor's progress in speech-language therapy.    Since his initial evaluation, Galileo has attended 20 sessions. Therapy sessions have targeted cognitive-communication. A home exercise program (HEP) addressing use of compensatory strategies (internal and external supports) and functional exercises has been provided and completed consistently. During this treatment period, Galileo has demonstrated improved use of strategies and carryover of skills for home and job related tasks. As he has demonstrated significant progress and has met all goals, it is recommended that he be discharged from speech therapy at this time.    Subjective: Patient arrived on time to session. Patient participated actively in therapeutic tasks.     Pain: No pain reported on this date. Patient not being seen for pain.    Objective:  Goals: (to be met in 8 additional visits)   STG 1: Patient will independently verbalize compensatory memory strategies (processing, working memory, short-term memory, attention, problem-solving) and describe 1 use per strategy.  Progress: Patient verbalized understanding of compensatory strategies and external supports independently. Goal met.    STG 2: Patient will recall novel paragraph length stimuli with 90% accuracy following a 20 minute delay, with min verbal and visual cues for use of compensatory memory strategies.  Progress: 90% following 20 minute delay given min verbal cues. Goal  met.    STG 3: Patient will accurately complete working memory tasks within 90% of opportunities given min verbal and visual cueing.  Progress: 90% given min verbal and visual cueing. Patient increased accuracy given opportunities to practice and discussion of supports. Goal met.    STG 4B: Patient will utilize accurate planning and note-taking skills within 90% of opportunities independently.  Progress: Patient utilized planning and note-taking skills within 95% of opportunities independently. Goal met.    STG 5B: Patient will divide attention between two higher level complexity tasks given less than 1 verbal redirections/reminders.  Progress: Required average of 1 verbal redirections/reminders. Goal met.    The Raymond Cognitive Assessment (MoCA) was utilized to assess patient's cognitive-communication skills within the following domains: Visuospatial/executive function, naming, memory, attention, language, abstraction/reasoning, delayed recall, and orientation. The results are as follows: 28/30 indicating normal cognitive function (Normal range is >26/30). Delayed recall 4/5 items. Difficulty orientating to date.    MoCA Scores  26 to 30=WNL  18-25=mild  10 to 17=moderate  Less than 10=severe    HEP: Home working memory tasks  Education: Use of compensatory strategies    Assessment: Patient has demonstrated significant progress toward goals from SOC. Patient has increased independence with use of compensatory strategies and supports to facilitate task completion, accuracy, and efficiency related to job and home life. Patient continues to benefit from these supports, however is independent with use and no longer requires skilled cueing. As such, it is recommended that he be discharged from speech therapy with recommendations for continued compliance to HEP.      Plan: Patient to be discharged from speech-language therapy as all goals have been met.     Patient/Family/Caregiver was advised of these findings,  precautions, and treatment options and has agreed to actively participate in planning and for this course of care.    Thank you for your referral. If you have any questions, please contact me at Dept: [phone].    Sincerely,  Electronically signed by therapist: YONG Johnson

## 2024-08-28 NOTE — TELEPHONE ENCOUNTER
Last office visit 5/16/24:    This is a 44 y/o male with a h/o a congential hydrocephalus s/p shunting and revision and seizures.  I will continue Depakote 500mg ER Qday for now. I will repeat the EEG.  We can go down on his Depakote to 250 mg daily at this time.  If EEG does not show any sort of ictal activity or interictal discharges we can consider stopping the Depakote.  He never had a seizure before this one-time event.  Also noted to have CNS infections time which may have been a contributing factor to the seizures so I feel like we can reduce and taper off his Depakote.  I did advise he would not be able to drive until seeing me next we taper off the Depakote       Future Appointments   Date Time Provider Department Center   9/12/2024  8:00 AM Savita Vela LCSW [facility] [place]   9/23/2024  7:45 AM PF MRI RM1 (1.5T) PF [facility] [place]   9/26/2024  8:00 AM Viraj Good MD ENINAPER2 [facility] [place]   9/26/2024 10:00 AM Savita Vela LCSW [facility] [place]   10/9/2024  8:00 AM Savita Vela LCSW [facility] [place]   10/24/2024  8:00 AM Savita Vela LCSW [facility] [place]

## 2024-09-03 ENCOUNTER — OFFICE VISIT (OUTPATIENT)
Dept: NEUROLOGY | Facility: CLINIC | Age: 44
End: 2024-09-03
Payer: COMMERCIAL

## 2024-09-03 VITALS
WEIGHT: 173.31 LBS | BODY MASS INDEX: 28 KG/M2 | HEART RATE: 84 BPM | SYSTOLIC BLOOD PRESSURE: 115 MMHG | DIASTOLIC BLOOD PRESSURE: 68 MMHG | RESPIRATION RATE: 15 BRPM

## 2024-09-03 DIAGNOSIS — R56.9 SEIZURES (HCC): Primary | ICD-10-CM

## 2024-09-03 PROCEDURE — 3074F SYST BP LT 130 MM HG: CPT | Performed by: OTHER

## 2024-09-03 PROCEDURE — 99213 OFFICE O/P EST LOW 20 MIN: CPT | Performed by: OTHER

## 2024-09-03 PROCEDURE — 3078F DIAST BP <80 MM HG: CPT | Performed by: OTHER

## 2024-09-03 NOTE — PROGRESS NOTES
Neurology H&P    Galileo Villaseñor Patient Status:  No patient class for patient encounter    1980 MRN [id number]   [facility], [address] [place] [address] Attending No att. providers found   Hosp Day # 0 PCP Jessica Beatty,      Subjective:  Initial Clinic HPI 5/10/23  Galileo Villaseñor is a(n) 44 year old male with a PMH significant for anxiety and hydrocephalus s/p VPS. He comes to the neurology clinic for evaluation and management of sezures. 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 He had a shunt revision in childhood and then again by Dr. Aaron in . He comes with his wife today. He states that he was at a birthday party. He felt dizzy almost like an out of body experience when he entered the party. He states that sounds sounded muffled like a duglas brown cartoon. He was having difficulty functioning. He was unable to text on his phone. He was unable to form a complete thought or sentence. His wife states that he was not making any sense at all and his pupils were pinpoint. He had not had any EtOH or illicit's. His wife states that prior to getting to this party he was completely fine. His wife got him up and he was able to walk to the car. He has no recollection of this event. He was sheila to walk into the ED. He was unable to answeres any of the triage questions. He had a CTH and when brought back had a seizure. Per wife who witnessed this he made a sound like he was in pain or a groaning noise, his eyes then rolled back into his head. He then became very rigid and he started shaking all over. Per wife they gave him ativan and then this abated. He was admitted. He had 2 EEGs one of which showed frequent R temporal sharp waves per report and an MRI. He has followed up nuerosurgery as well.        Interim History:  Pt was last seen in the clinic on 24. Since that time he has not had any seizures. He has been doing well. No new numbness weakness or  tinging.  His most recent EEG was unremarkable. He states that he has been doing well. He is only taking Depakote 250mg ER Qday at this time    Current Medications:  Current Outpatient Medications   Medication Sig Dispense Refill    divalproex  MG Oral Tablet 24 Hr Take 2 tablets (500 mg total) by mouth daily. Take two tablets (500 mg) daily until pt completes surgical procedure on 5/28/2024 180 tablet 0    escitalopram 10 MG Oral Tab Take 1 tablet (10 mg total) by mouth daily.      Multiple Vitamins-Minerals (MULTIVITAMIN ADULT EXTRA C OR) Take 1 tablet by mouth daily.      omega-3 fatty acids 1000 MG Oral Cap Take 1,000 mg by mouth daily.         Problem List:  Patient Active Problem List   Diagnosis    Hx of hydrocephalus    Family history of skin cancer    Anxiety    Shunt malfunction    Scar of neck    Seizures (HCC)    Infection of  (ventriculoperitoneal) shunt (HCC)    Allergic rhinitis    Gastroesophageal reflux disease    Hydrocephalus (HCC)    Granulation tissue       PMHx:  Past Medical History:    Abdominal pain    ALCOHOL USE    Alcoholism on both sides    Allergic rhinitis    Anxiety    After performing the test asked by dr amezcua i had an anxiety    Anxiety state    Bloating    Diarrhea, unspecified    Enlarged prostate    Flatulence/gas pain/belching    Frequent urination    Heartburn    Hydrocephalus (HCC)    Indigestion    Nodule of right lung    PONV (postoperative nausea and vomiting)    S/P  shunt    REMOVED LAST YEAR- 2023 FOLLOWING INFECTION    Seizure (HCC)    Seizure disorder (HCC)    Sleep disturbance    Stress    Visual impairment    glasses    Wears glasses       PSHx:  Past Surgical History:   Procedure Laterality Date    Brain surgery  06/13/2023    Brain surgery  06/20/2023    Colonoscopy N/A 06/20/2023    Procedure: COLONOSCOPY with retreival of foreign body;  Surgeon: Anibal Hodges DO;  Location:  ENDOSCOPY    Hc implant shunt      HYDROCEPHALUS SHUNTED TWICE     Hernia surgery      Other  2021    VENTRICULOPERITONEAL SHUNT REVISION,  REVISION DISTAL CATHETER ONLY AT RIGHT ANTERIOR CERVICAL REGION (CAREY), Excision of right neck cyst, scar  ([place])    Other surgical history  , , 2021    Hydracephalus , shunts placed.  revision    Other surgical history N/A 2023    RIGHT OCCIPITAL SHUNT REMOVAL, LEFT  SHUNT EXTERNALIZATION AND  SHUNT TAP    Reconstr nose      Vasectomy         SocHx:  Social History     Socioeconomic History    Marital status:    Tobacco Use    Smoking status: Former     Current packs/day: 0.00     Average packs/day: 0.5 packs/day for 5.0 years (2.5 ttl pk-yrs)     Types: Cigarettes     Start date:      Quit date: 2016     Years since quittin.6    Smokeless tobacco: Never    Tobacco comments:     Passive   Vaping Use    Vaping status: Never Used   Substance and Sexual Activity    Alcohol use: Yes     Alcohol/week: 4.0 standard drinks of alcohol     Types: 4 Cans of beer per week     Comment: SOCIAL    Drug use: No     Comment: smoked cannabis for 4 years passive    Sexual activity: Yes     Partners: Female   Other Topics Concern    Pt has a pacemaker No    Pt has a defibrillator No    Reaction to local anesthetic No    Caffeine Concern Yes     Comment: coffee daily    Exercise Yes     Comment: 2-4 days per week    Seat Belt Yes    Special Diet No    Stress Concern No    Weight Concern No     Social Determinants of Health     Food Insecurity: Low Risk  (2023)    Received from [facility]    Food Insecurity     Have there been times that your food ran out, and you didn't have money to get more?: No     Are there times that you worry that this might happen?: No   Transportation Needs: Low Risk  (2023)    Received from [facility]    Transportation Needs     Do you have trouble  getting transportation to medical appointments?: No        Family History:  Family History   Problem Relation Age of Onset    No Known Problems Father     No Known Problems Mother     Cancer Maternal Grandmother         SKIN CANCER    Heart Disease Maternal Grandmother     Alcohol and Other Disorders Associated Maternal Grandfather     Anxiety Maternal Grandfather     Heart Disease Maternal Grandfather     Cancer Paternal Grandmother         SKIN CANCER    Heart Disease Paternal Grandmother     Hypertension Paternal Grandfather     Alcohol and Other Disorders Associated Paternal Grandfather     Stroke Paternal Grandfather     Heart Disease Paternal Grandfather           ROS:  10 point ROS completed and was negative, except for pertinent positive and negatives stated in subjective.    Objective/Physical Exam:    Vital Signs:  There were no vitals taken for this visit.    Gen: Awake and in no apparent distress  HEENT: moist mucus membranes  Neck: Supple  Cardiovascular: Regular rate and rhythm, no murmur  Pulm: CTAB  GI: non-tender, normal bowel sounds  Skin: normal, dry  Extremities: No clubbing or cyanosis      Neurologic:   MENTAL STATUS: alert, ox3, normal attention, language and fund of knowledge.      CRANIAL NERVES II to XII: PERRLA, no ptosis or diplopia, EOM intact, facial sensation intact, strong eye closure, face is symmetric, no dysarthria, tongue midline,  no tongue fasciculations or atrophy, strong shoulder shrug.    MOTOR EXAMINATION: normal tone, no fasciculations, normal strength throughout in UEs and LEs      SENSORY EXAMINATION:  UE: intact to light touch, pinprick intact  LE: intact to light touch, pinprick intact    COORDINATION:  No dysmetria, or intention tremors     REFLEXES: 2+ at biceps, 3+ brachioradialis, 3+ at patella, 2+ at the ankles, + BUE Hoffmans    GAIT: normal stance,  tandem well.    Romberg's: negative      Labs:       Imaging:  [facility] 12/3/21  FINDINGS:         Right posterior  temporal ventriculostomy catheter that extends into CSF cystic space adjacent to the atria the right lateral ventricle is stable.  Right suboccipital catheter extending to the apex of the tentorium and adjacent to the straight sinus is   stable.  Left parietal ventriculostomy catheter has tip adjacent to the septum pellucidum and in the right frontal horn is stable.  Additional left parietal ventriculostomy catheter extending along the subdural space/subarachnoid space is stable.       Sequelae of left frontal parietal craniotomy and right temporal craniotomy is noted.       Dilated CSF space adjacent to the right lateral ventricle is stable.  Mild asymmetric prominence of the right temporal horn with CSF space extends over the tentorium and adjacent to the right cerebral peduncle is stable.       Arachnoid cyst adjacent to the right cerebellar hemisphere is relatively stable.  Associated mass effect is unchanged.       Cerebellar tonsillar ectopia is stable.       Mild to moderate mucoperiosteal thickening of the paranasal sinuses is stable.                                    Impression   CONCLUSION:         No significant changes since prior exam.          EEG 4/24/23  Interpretation:     This is an abnormal EEG due to frequent high amplitude slow waves in the   right temporal area, the majority of which do not appear epileptiform.     Clinical Correlation:     The EEG is improved compared to yesterday but still shows significant   neuronal dysfunction and risk for seizures from the right temporal area.     EEG 4/23/23  Interpretation:     This is an abnormal EEG due to slowing and frequent epileptiform   discharges in the right temporal area.     Clinical Correlation:     The finding indicates a significant risk for further right temporal lobe   seizures.     EEG 8/2023  IMPRESSION:  This EEG is a normal study recorded in the awake and asleep states. No focal, lateralizing, or epileptiform activity is seen and no  seizures are recorded.      Report covers  Start 8/24/23 at 1154  End 8/24/23 at 1252    IMPRESSION:  This EEG is a normal study recorded in the awake and asleep states. No focal, lateralizing, or epileptiform activity is seen and no seizures are recorded.      Report covers  Start 6/12/24 at 0713  End 6/12/24 at 0823    Assessment:  This is a 42 y/o male with a h/o a congential hydrocephalus s/p shunting and revision and a single seizure.  His most recent 2 EEGs were normal.  He reports that he is only had 1 seizure during the setting of shunt infection in the past.  Never prior to nor after this.  Stop his Depakote at this point in time.  He is only been on 250 mg daily for several months has not had any seizures, AMS, shaking spells or anything else resembling a seizure.  I did discuss that he should not drive until see me next in clinic and being a few months off of all ASMs and seizure-free.  He verbalized understanding      Plan:  1. Seizures  - Past EEGs were normal  - Ok to stop Depakote for now  - No driving until reevaluated in clinic     RTC in 4 months    Dewey Bowens, DO  Neurology

## 2024-09-03 NOTE — PATIENT INSTRUCTIONS
After your visit at the [facility] today,  please direct any follow up questions or medication needs to the staff in our [place] office so that your concerns may be promptly addressed.  We are available through [employer] or at the numbers below:    The phone number is:   [phone] option #1    The fax number is:  [phone]    Your pharmacy should also send any requests electronically to the [place] office.  Refill policies:    Allow 2-3 business days for refills; controlled substances may take longer.  Contact your pharmacy at least 5 days prior to running out of medication and have them send an electronic request or submit request through the “request refill” option in your [employer] account.  Refills are not addressed on weekends; covering physicians do not authorize routine medications on weekends.  No narcotics or controlled substances are refilled after noon on Fridays or by on call physicians.  By law, narcotics must be electronically prescribed.  A 30 day supply with no refills is the maximum allowed.  If your prescription is due for a refill, you may be due for a follow up appointment.  To best provide you care, patients receiving routine medications need to be seen at least once a year.  Patients receiving narcotic/controlled substance medications need to be seen at least once every 3 months.  In the event that your preferred pharmacy does not have the requested medication in stock (e.g. Backordered), it is your responsibility to find another pharmacy that has the requested medication available.  We will gladly send a new prescription to that pharmacy at your request.    Scheduling Tests:    If your physician has ordered radiology tests such as MRI or CT scans, please contact Central Scheduling at [phone] right away to schedule the test.  Once scheduled, the [facility] Centralized Referral Team will work with your insurance carrier to obtain pre-certification or prior authorization.   Depending on your insurance carrier, approval may take 3-10 days.  It is highly recommended patients assure they have received an authorization before having a test performed.  If test is done without insurance authorization, patient may be responsible for the entire amount billed.      Precertification and Prior Authorizations:  If your physician has recommended that you have a procedure or additional testing performed the [facility] Centralized Referral Team will contact your insurance carrier to obtain pre-certification or prior authorization.    You are strongly encouraged to contact your insurance carrier to verify that your procedure/test has been approved and is a COVERED benefit.  Although the [facility] Centralized Referral Team does its due diligence, the insurance carrier gives the disclaimer that \"Although the procedure is authorized, this does not guarantee payment.\"    Ultimately the patient is responsible for payment.   Thank you for your understanding in this matter.  Paperwork Completion:  If you require FMLA or disability paperwork for your recovery, please make sure to either drop it off or have it faxed to our office at [phone]. Be sure the form has your name and date of birth on it.  The form will be faxed to our Forms Department and they will complete it for you.  There is a 25$ fee for all forms that need to be filled out.  Please be aware there is a 10-14 day turnaround time.  You will need to sign a release of information (KEATON) form if your paperwork does not come with one.  You may call the Forms Department with any questions at [phone].  Their fax number is [phone].

## 2024-09-23 ENCOUNTER — HOSPITAL ENCOUNTER (OUTPATIENT)
Dept: MRI IMAGING | Age: 44
Discharge: HOME | End: 2024-09-23
Attending: NEUROLOGICAL SURGERY
Payer: COMMERCIAL

## 2024-09-23 DIAGNOSIS — R41.3 MEMORY DIFFICULTY: ICD-10-CM

## 2024-09-23 PROCEDURE — 70553 MRI BRAIN STEM W/O & W/DYE: CPT | Performed by: NEUROLOGICAL SURGERY

## 2024-09-23 PROCEDURE — A9575 INJ GADOTERATE MEGLUMI 0.1ML: HCPCS | Performed by: NEUROLOGICAL SURGERY

## 2024-09-23 RX ORDER — GADOTERATE MEGLUMINE 376.9 MG/ML
20 INJECTION INTRAVENOUS
Status: COMPLETED | OUTPATIENT
Start: 2024-09-23 | End: 2024-09-23

## 2024-09-23 RX ADMIN — GADOTERATE MEGLUMINE 17 ML: 376.9 INJECTION INTRAVENOUS at 08:36:00

## 2024-09-26 ENCOUNTER — TELEPHONE (OUTPATIENT)
Dept: SURGERY | Facility: CLINIC | Age: 44
End: 2024-09-26

## 2024-09-26 ENCOUNTER — OFFICE VISIT (OUTPATIENT)
Dept: SURGERY | Facility: CLINIC | Age: 44
End: 2024-09-26
Payer: COMMERCIAL

## 2024-09-26 VITALS
SYSTOLIC BLOOD PRESSURE: 110 MMHG | DIASTOLIC BLOOD PRESSURE: 60 MMHG | HEART RATE: 80 BPM | WEIGHT: 170 LBS | BODY MASS INDEX: 26.68 KG/M2 | HEIGHT: 67 IN

## 2024-09-26 DIAGNOSIS — T85.730D INFECTION OF VENTRICULOPERITONEAL SHUNT, SUBSEQUENT ENCOUNTER: ICD-10-CM

## 2024-09-26 DIAGNOSIS — R56.9 SEIZURE (HCC): Primary | ICD-10-CM

## 2024-09-26 DIAGNOSIS — Z86.69 HX OF HYDROCEPHALUS: ICD-10-CM

## 2024-09-26 DIAGNOSIS — R22.1 NECK SWELLING: ICD-10-CM

## 2024-09-26 DIAGNOSIS — R41.3 MEMORY DIFFICULTY: ICD-10-CM

## 2024-09-26 DIAGNOSIS — G04.90: ICD-10-CM

## 2024-09-26 PROCEDURE — 3078F DIAST BP <80 MM HG: CPT | Performed by: NEUROLOGICAL SURGERY

## 2024-09-26 PROCEDURE — 99214 OFFICE O/P EST MOD 30 MIN: CPT | Performed by: NEUROLOGICAL SURGERY

## 2024-09-26 PROCEDURE — 3008F BODY MASS INDEX DOCD: CPT | Performed by: NEUROLOGICAL SURGERY

## 2024-09-26 PROCEDURE — 3074F SYST BP LT 130 MM HG: CPT | Performed by: NEUROLOGICAL SURGERY

## 2024-09-26 NOTE — PATIENT INSTRUCTIONS
Refill policies:    Allow 2-3 business days for refills; controlled substances may take longer.  Contact your pharmacy at least 5 days prior to running out of medication and have them send an electronic request or submit request through the “request refill” option in your [employer] account.  Refills are not addressed on weekends; covering physicians do not authorize routine medications on weekends.  No narcotics or controlled substances are refilled after noon on Fridays or by on call physicians.  By law, narcotics must be electronically prescribed.  A 30 day supply with no refills is the maximum allowed.  If your prescription is due for a refill, you may be due for a follow up appointment.  To best provide you care, patients receiving routine medications need to be seen at least once a year.  Patients receiving narcotic/controlled substance medications need to be seen at least once every 3 months.  In the event that your preferred pharmacy does not have the requested medication in stock (e.g. Backordered), it is your responsibility to find another pharmacy that has the requested medication available.  We will gladly send a new prescription to that pharmacy at your request.    Scheduling Tests:    If your physician has ordered radiology tests such as MRI or CT scans, please contact Central Scheduling at [phone] right away to schedule the test.  Once scheduled, the [facility] Centralized Referral Team will work with your insurance carrier to obtain pre-certification or prior authorization.  Depending on your insurance carrier, approval may take 3-10 days.  It is highly recommended patients assure they have received an authorization before having a test performed.  If test is done without insurance authorization, patient may be responsible for the entire amount billed.      Precertification and Prior Authorizations:  If your physician has recommended that you have a procedure or additional testing performed the [facility]  Centralized Referral Team will contact your insurance carrier to obtain pre-certification or prior authorization.    You are strongly encouraged to contact your insurance carrier to verify that your procedure/test has been approved and is a COVERED benefit.  Although the [facility] Centralized Referral Team does its due diligence, the insurance carrier gives the disclaimer that \"Although the procedure is authorized, this does not guarantee payment.\"    Ultimately the patient is responsible for payment.   Thank you for your understanding in this matter.  Paperwork Completion:  If you require FMLA or disability paperwork for your recovery, please make sure to either drop it off or have it faxed to our office at [phone]. Be sure the form has your name and date of birth on it.  The form will be faxed to our Forms Department and they will complete it for you.  There is a 25$ fee for all forms that need to be filled out.  Please be aware there is a 10-14 day turnaround time.  You will need to sign a release of information (KEATON) form if your paperwork does not come with one.  You may call the Forms Department with any questions at [phone].  Their fax number is [phone].

## 2024-09-26 NOTE — PROGRESS NOTES
[facility]  Neurological Surgery Clinic Note    Galileo Villaseñor  7/13/1980  [id number]  PCP: Jessica Beatty DO    REASON FOR VISIT:  Cerebral infection/Shunt infection follow up     HISTORY OF PRESENT ILLNESS:  Galileo Villaseñor is a 44 year old male with a complex hydrocephalus and cerebral cyst history with multiple shunt systems who presented to an outside hospital in April 2023 with a new onset seizure.  MRI brain at that time demonstrated stable ventricular size and enhancement of the right sided posterior fossa cyst wall with an indwelling catheter.  We repeated an MRI brain six weeks later which demonstrated reduction of the right posterior fossa cyst with increased enhancement in that area, which ultimately led to the diagnosis of a cerebral/shunt infection due to erosion of a peritoneal catheter into the bowel.  On 6/12/23 he underwent removal of the right occipital cysto-peritoneal shunt and right sub-occipital shunt distal catheter, an exploratory laparotomy, and externalization of the left occipital ventriculoperitoneal shunt, which was subsequently removed on 6/16/23. Since that time, he has completed his antibiotic course.  Post operatively, he had memory difficulty, which has significantly improved with mild residual.  He has intermittent dizziness episodes, including when on long car rides, since surgery. He reports improved vision.  He reports intermittent tinnitus. He notes that he has been doing a lot of physical activity and then feels not right, which initiates an anxiety attack.  He is not currently seeing a therapist or psychiatrist.     Interval History 3/22/24  He and his wife report ongoing memory difficulty, but it has improved and he's returned to work.  Finished Speech therapy in December 2023. MRI brain with and without contrast 3/18/24 demonstrates improved enhancement in the right posterior fossa cyst/lesion and the report notes slight  increased FLAIR signal, although on my review, it appears similar. 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 Ackerman for most recently last month.  Otherwise he has no other symptoms.    Interval history 9/26/2024  Patient presents for routine follow-up after planned serial imaging.  He reports that he has started on anxiety medicine which is significantly improved his anxiety level.  He has completed multiple courses of therapy with speech-language pathology and has improved memory but is not completely back to his baseline.  He is back to work and is overall doing well.  In May 2024 he underwent removal of a right retroauricular granuloma by Dr. Ackerman which was attributable to a calcified catheter tract.  He has recovered from this very well and has complete healing of that site behind his right ear.  MRI brain 9/23/2024 is stable as compared to all of his prior imaging which I reviewed.  He has been weaned from antiepileptic medications by neurology.    PAST MEDICAL HISTORY:  Past Medical History:    Abdominal pain    ALCOHOL USE    Alcoholism on both sides    Allergic rhinitis    Anxiety    After performing the test asked by dr amezcua i had an anxiety    Anxiety state    Bloating    Diarrhea, unspecified    Enlarged prostate    Flatulence/gas pain/belching    Frequent urination    Heartburn    Hydrocephalus (HCC)    Indigestion    Nodule of right lung    PONV (postoperative nausea and vomiting)    S/P  shunt    REMOVED LAST YEAR- 2023 FOLLOWING INFECTION    Seizure (HCC)    Seizure disorder (HCC)    Sleep disturbance    Stress    Visual impairment    glasses    Wears glasses       PAST SURGICAL HISTORY:  Past Surgical History:   Procedure Laterality Date    Brain surgery  06/13/2023    Brain surgery  06/20/2023    Colonoscopy N/A 06/20/2023    Procedure: COLONOSCOPY with retreival of foreign body;  Surgeon: Anibal Hodges DO;  Location:  ENDOSCOPY    Hc implant shunt       HYDROCEPHALUS SHUNTED TWICE    Hernia surgery  1983    Other  11/08/2021    VENTRICULOPERITONEAL SHUNT REVISION,  REVISION DISTAL CATHETER ONLY AT RIGHT ANTERIOR CERVICAL REGION (CAREY), Excision of right neck cyst, scar  ([facility])    Other surgical history  1983, 1987, 11/8/2021    Hydracephalus , shunts placed. 11/8 revision    Other surgical history N/A 06/12/2023    RIGHT OCCIPITAL SHUNT REMOVAL, LEFT  SHUNT EXTERNALIZATION AND  SHUNT TAP    Reconstr nose  1996    Vasectomy  2021       FAMILY HISTORY:  family history includes Alcohol and Other Disorders Associated in his maternal grandfather and paternal grandfather; Anxiety in his maternal grandfather; Cancer in his maternal grandmother and paternal grandmother; Heart Disease in his maternal grandfather, maternal grandmother, paternal grandfather, and paternal grandmother; Hypertension in his paternal grandfather; No Known Problems in his father and mother; Stroke in his paternal grandfather.    SOCIAL HISTORY:   reports that he quit smoking about 8 years ago. His smoking use included cigarettes. He started smoking about 15 years ago. He has a 2.5 pack-year smoking history. He has never used smokeless tobacco. He reports current alcohol use of about 4.0 standard drinks of alcohol per week. He reports that he does not use drugs.    ALLERGIES:  Allergies   Allergen Reactions    Penicillins HIVES       MEDICATIONS:  Current Outpatient Medications on File Prior to Visit   Medication Sig Dispense Refill    escitalopram 10 MG Oral Tab Take 1.5 tablets (15 mg total) by mouth daily.      Multiple Vitamins-Minerals (MULTIVITAMIN ADULT EXTRA C OR) Take 1 tablet by mouth daily.      omega-3 fatty acids 1000 MG Oral Cap Take 1,000 mg by mouth daily.       No current facility-administered medications on file prior to visit.       REVIEW OF SYSTEMS:  A 10-point system was reviewed.  Pertinent positives and negatives are noted in HPI.      PHYSICAL EXAMINATION:  VITAL  SIGNS: /60 (BP Location: Right arm, Patient Position: Sitting, Cuff Size: adult)   Pulse 80   Ht 67\"   Wt 170 lb (77.1 kg)   BMI 26.63 kg/m²     A&Ox3, no acute distress  PERRL, EOMi, FS, TM  Full strength x 4, no drift  Sensation intact   Incisions well-healed    ASSESSMENT:  45yo male with a complex shunt history of recent infection and removal of some of his shunt systems and exploratory laparotomy in June 2023 now with stable serial imaging     Plan:  - Repeat MRI brain with and without contrast in 1 year    Viraj Good MD  Neurological Surgery  [facility] Time: 30 min including face to face time, chart review, imaging interpretation, and coordination of care

## 2024-10-02 ENCOUNTER — TELEMEDICINE (OUTPATIENT)
Dept: FAMILY MEDICINE CLINIC | Facility: CLINIC | Age: 44
End: 2024-10-02
Payer: COMMERCIAL

## 2024-10-02 DIAGNOSIS — R05.1 ACUTE COUGH: ICD-10-CM

## 2024-10-02 DIAGNOSIS — J40 BRONCHITIS WITH ACUTE WHEEZING: Primary | ICD-10-CM

## 2024-10-02 PROCEDURE — 99213 OFFICE O/P EST LOW 20 MIN: CPT | Performed by: FAMILY MEDICINE

## 2024-10-02 RX ORDER — ALBUTEROL SULFATE 90 UG/1
2 INHALANT RESPIRATORY (INHALATION) EVERY 4 HOURS PRN
Qty: 1 EACH | Refills: 0 | Status: SHIPPED | OUTPATIENT
Start: 2024-10-02

## 2024-10-02 RX ORDER — DEXTROMETHORPHAN HYDROBROMIDE AND PROMETHAZINE HYDROCHLORIDE 15; 6.25 MG/5ML; MG/5ML
5 SYRUP ORAL 4 TIMES DAILY PRN
Qty: 100 ML | Refills: 0 | Status: SHIPPED | OUTPATIENT
Start: 2024-10-02 | End: 2024-10-07

## 2024-10-02 RX ORDER — PREDNISONE 20 MG/1
20 TABLET ORAL 2 TIMES DAILY
Qty: 10 TABLET | Refills: 0 | Status: SHIPPED | OUTPATIENT
Start: 2024-10-02 | End: 2024-10-07

## 2024-10-02 RX ORDER — AZITHROMYCIN 250 MG/1
TABLET, FILM COATED ORAL
Qty: 6 TABLET | Refills: 0 | Status: SHIPPED | OUTPATIENT
Start: 2024-10-02 | End: 2024-10-06

## 2024-10-02 NOTE — PROGRESS NOTES
This visit is conducted using Telemedicine with live, interactive video and audio.    Patient has been referred to the [facility] website at [email or web address] to review the yearly Consent to Treat document.    Patient understands and accepts financial responsibility for any deductible, co-insurance and/or co-pays associated with this service.           I conducted a telehealth visit with Galileo Villaseñor today, 10/02/24, which was completed using two-way, real-time interactive audio and video communication.  CHIEF COMPLAINT:   cough  HPI:   Galileo Villaseñor is a 44 year old male who presents for evaluation of a cough.  Onset was 14 days ago. The cough has been getting worse. Treatments tried OTC cough medication, tylenol.  Reports minor fever, chills, sputum production, wheezing. Denies lung disease, asthma, copd, weight loss,  smoking, heart disease. Sick contacts: coworkers and kids. Recent travel: Denies Exposure to TB: Denies. Associated symptoms include: congestion, low grade fever, cough is keeping pt up at night, wheezing, prior history of bronchitis.      Current Outpatient Medications   Medication Sig Dispense Refill    promethazine-dextromethorphan 6.25-15 MG/5ML Oral Syrup Take 5 mL by mouth 4 (four) times daily as needed for cough. 100 mL 0    albuterol 108 (90 Base) MCG/ACT Inhalation Aero Soln Inhale 2 puffs into the lungs every 4 (four) hours as needed for Wheezing or Shortness of Breath (cough). 1 each 0    predniSONE 20 MG Oral Tab Take 1 tablet (20 mg total) by mouth 2 (two) times daily for 5 days. 10 tablet 0    azithromycin (ZITHROMAX Z-MAHSA) 250 MG Oral Tab Take 2 tablets (500 mg total) by mouth daily for 1 day, THEN 1 tablet (250 mg total) daily for 4 days. 6 tablet 0    escitalopram 10 MG Oral Tab Take 1.5 tablets (15 mg total) by mouth daily.      Multiple Vitamins-Minerals (MULTIVITAMIN ADULT EXTRA C OR) Take 1 tablet by mouth daily.      omega-3 fatty acids 1000 MG Oral Cap Take 1,000 mg by mouth  daily.        Past Medical History:    Abdominal pain    ALCOHOL USE    Alcoholism on both sides    Allergic rhinitis    Anxiety    After performing the test asked by dr amezcua i had an anxiety    Anxiety state    Bloating    Diarrhea, unspecified    Enlarged prostate    Flatulence/gas pain/belching    Frequent urination    Heartburn    Hydrocephalus (HCC)    Indigestion    Nodule of right lung    PONV (postoperative nausea and vomiting)    S/P  shunt    REMOVED LAST YEAR-  FOLLOWING INFECTION    Seizure (HCC)    Seizure disorder (HCC)    Sleep disturbance    Stress    Visual impairment    glasses    Wears glasses      Past Surgical History:   Procedure Laterality Date    Brain surgery  2023    Brain surgery  2023    Colonoscopy N/A 2023    Procedure: COLONOSCOPY with retreival of foreign body;  Surgeon: Anibal Hodges DO;  Location:  ENDOSCOPY    Hc implant shunt      HYDROCEPHALUS SHUNTED TWICE    Hernia surgery      Other  2021    VENTRICULOPERITONEAL SHUNT REVISION,  REVISION DISTAL CATHETER ONLY AT RIGHT ANTERIOR CERVICAL REGION (CAREY), Excision of right neck cyst, scar  ([facility])    Other surgical history  , , 2021    Hydracephalus , shunts placed.  revision    Other surgical history N/A 2023    RIGHT OCCIPITAL SHUNT REMOVAL, LEFT  SHUNT EXTERNALIZATION AND  SHUNT TAP    Reconstr nose  1996    Vasectomy           Social History     Socioeconomic History    Marital status:    Tobacco Use    Smoking status: Former     Current packs/day: 0.00     Average packs/day: 0.5 packs/day for 5.0 years (2.5 ttl pk-yrs)     Types: Cigarettes     Start date:      Quit date: 2016     Years since quittin.7    Smokeless tobacco: Never    Tobacco comments:     Passive   Vaping Use    Vaping status: Never Used   Substance and Sexual Activity    Alcohol use: Yes     Alcohol/week: 4.0 standard drinks of alcohol     Types: 4 Cans of beer per  week     Comment: SOCIAL    Drug use: No     Comment: smoked cannabis for 4 years passive    Sexual activity: Yes     Partners: Female   Other Topics Concern    Pt has a pacemaker No    Pt has a defibrillator No    Reaction to local anesthetic No    Caffeine Concern Yes     Comment: coffee daily    Exercise Yes     Comment: 2-4 days per week    Seat Belt Yes    Special Diet No    Stress Concern No    Weight Concern No     Social Determinants of Health     Food Insecurity: Low Risk  (4/22/2023)    [facility]    Food Insecurity     Have there been times that your food ran out, and you didn't have money to get more?: No     Are there times that you worry that this might happen?: No   Transportation Needs: Low Risk  (4/22/2023)    [facility]    Transportation Needs     Do you have trouble getting transportation to medical appointments?: No         REVIEW OF SYSTEMS:   GENERAL: normal appetite  SKIN: no rashes or abnormal skin lesions  HEENT: See HPI  LUNGS: denies shortness of breath or wheezing, See HPI  CARDIOVASCULAR: denies chest pain or palpitations   GI: denies N/V/C or abdominal pain  NEURO: Denies headaches    EXAM:   General: Alert  Respiratory:   Speaking in full sentences comfortably  Normal work of breathing  Coughing during visit   Head: Normocephalic  Nose: No obvious nasal discharge.  Skin: No obvious rashes or lesions from what observed.     No results found for this or any previous visit (from the past 24 hour(s)).    ASSESSMENT AND PLAN:   Galileo Villaseñor is a 44 year old male who presents with symptoms that are consistent with    ASSESSMENT:   Encounter Diagnoses   Name Primary?    Bronchitis with acute wheezing Yes    Acute cough        PLAN: Meds as below.  See patient Instructions    Meds & Refills for this Visit:  Requested Prescriptions     Signed Prescriptions  Disp Refills    promethazine-dextromethorphan 6.25-15 MG/5ML Oral Syrup 100 mL 0     Sig: Take 5 mL by mouth 4 (four) times daily as needed for cough.    albuterol 108 (90 Base) MCG/ACT Inhalation Aero Soln 1 each 0     Sig: Inhale 2 puffs into the lungs every 4 (four) hours as needed for Wheezing or Shortness of Breath (cough).    predniSONE 20 MG Oral Tab 10 tablet 0     Sig: Take 1 tablet (20 mg total) by mouth 2 (two) times daily for 5 days.    azithromycin (ZITHROMAX Z-MAHSA) 250 MG Oral Tab 6 tablet 0     Sig: Take 2 tablets (500 mg total) by mouth daily for 1 day, THEN 1 tablet (250 mg total) daily for 4 days.       Risks, benefits, and side effects of medication explained and discussed.    Recommended increased fluids/humidity  12-hour decongestant nasal spray twice daily for a maximum of 4 days  Steam inhalation for sinus pressure   OTC cold and flu medication as needed.   Robitussin or Robitussin DM as needed for cough  1 tsp honey for the cough prn    Tylenol as needed/Ibuprofen as needed, do not exceed 4000 mg of acetaminophen or 2400 mg of ibuprofen    Recheck if not improved in one week or sooner if worsening.    The patient indicates understanding of these issues and agrees to the plan.  The patient is asked to see PCP if sx's persist for more than 2 weeks, sooner if worsen.    Face to face time spent on Video Visit: 5  Total Time spent on visit including reviewing history, ordering labs/medication, patient examination and education: 10    Galileo Villaseñor understands video visit evaluation is not a substitute for face-to-face examination or emergency care. Patient advised to go to ER or call 911 for worsening symptoms or acute distress.      Included in this visit, time may have been spent reviewing labs, medications, radiology tests and decision making.

## 2024-10-11 DIAGNOSIS — R56.9 SEIZURES (HCC): ICD-10-CM

## 2024-10-11 NOTE — TELEPHONE ENCOUNTER
Refused. Patient was directed by provider to discontinue this medication.         Medication: DIVALPROEX  MG Oral Tablet 24 Hr      Date of last refill: 05/20/2024 (#180/0)-discon  [place]     Last office visit: 09/03/2024  Due back to clinic per last office note:  Around 01/03/2025  Date next office visit scheduled:    Future Appointments   Date Time Provider Department Center   10/24/2024  8:00 AM Savita Vela, LCSW [facility]   11/7/2024  8:00 AM Dane, Zena F, LCSW [facility]   11/21/2024  8:00 AM Dane, Zena F, LCSW [facility]   12/5/2024  8:00 AM Savita Vela F, LCSW [facility]   1/2/2025  8:00 AM Savita Vela, LCSW [facility]   9/26/2025  8:45 AM Rufina Paula PA-C [facility] [place]           Last OV note recommendation:    Assessment:  This is a 42 y/o male with a h/o a congential hydrocephalus s/p shunting and revision and a single seizure.  His most recent 2 EEGs were normal.  He reports that he is only had 1 seizure during the setting of shunt infection in the past.  Never prior to nor after this.  Stop his Depakote at this point in time.  He is only been on 250 mg daily for several months has not had any seizures, AMS, shaking spells or anything else resembling a seizure.  I did discuss that he should not drive until see me next in clinic and being a few months off of all ASMs and seizure-free.  He verbalized understanding        Plan:  1. Seizures  - Past EEGs were normal  - Ok to stop Depakote for now  - No driving until reevaluated in clinic     RTC in 4 months     Dewey Bowens, DO  Neurology

## 2024-10-14 RX ORDER — DIVALPROEX SODIUM 250 MG/1
TABLET, FILM COATED, EXTENDED RELEASE ORAL
Qty: 180 TABLET | Refills: 0 | OUTPATIENT
Start: 2024-10-14

## 2025-04-06 NOTE — TELEPHONE ENCOUNTER
Patient came in complaining of flank pain and rectal bleeding that started on Monday. Pt states she has been seen multiple times in the past week for this and isn't getting better.    This has been addressed in other TE

## 2025-04-07 ENCOUNTER — PATIENT MESSAGE (OUTPATIENT)
Dept: FAMILY MEDICINE CLINIC | Facility: CLINIC | Age: 45
End: 2025-04-07

## 2025-04-22 ENCOUNTER — OFFICE VISIT (OUTPATIENT)
Dept: NEUROLOGY | Facility: CLINIC | Age: 45
End: 2025-04-22
Payer: COMMERCIAL

## 2025-04-22 VITALS
BODY MASS INDEX: 29 KG/M2 | WEIGHT: 186 LBS | DIASTOLIC BLOOD PRESSURE: 72 MMHG | RESPIRATION RATE: 16 BRPM | HEART RATE: 62 BPM | SYSTOLIC BLOOD PRESSURE: 120 MMHG

## 2025-04-22 DIAGNOSIS — R56.9 SEIZURES (HCC): Primary | ICD-10-CM

## 2025-04-22 PROCEDURE — 3078F DIAST BP <80 MM HG: CPT | Performed by: OTHER

## 2025-04-22 PROCEDURE — 99213 OFFICE O/P EST LOW 20 MIN: CPT | Performed by: OTHER

## 2025-04-22 PROCEDURE — 3074F SYST BP LT 130 MM HG: CPT | Performed by: OTHER

## 2025-04-22 RX ORDER — ESCITALOPRAM OXALATE 20 MG/1
10 TABLET ORAL 2 TIMES DAILY
COMMUNITY
Start: 2025-03-20

## 2025-04-22 NOTE — PATIENT INSTRUCTIONS
Refill policies:    Allow 2-3 business days for refills; controlled substances may take longer.  Contact your pharmacy at least 5 days prior to running out of medication and have them send an electronic request or submit request through the “request refill” option in your [employer] account.  Refills are not addressed on weekends; covering physicians do not authorize routine medications on weekends.  No narcotics or controlled substances are refilled after noon on Fridays or by on call physicians.  By law, narcotics must be electronically prescribed.  A 30 day supply with no refills is the maximum allowed.  If your prescription is due for a refill, you may be due for a follow up appointment.  To best provide you care, patients receiving routine medications need to be seen at least once a year.  Patients receiving narcotic/controlled substance medications need to be seen at least once every 3 months.  In the event that your preferred pharmacy does not have the requested medication in stock (e.g. Backordered), it is your responsibility to find another pharmacy that has the requested medication available.  We will gladly send a new prescription to that pharmacy at your request.    Scheduling Tests:    If your physician has ordered radiology tests such as MRI or CT scans, please contact Central Scheduling at [phone] right away to schedule the test.  Once scheduled, the [facility] Centralized Referral Team will work with your insurance carrier to obtain pre-certification or prior authorization.  Depending on your insurance carrier, approval may take 3-10 days.  It is highly recommended patients assure they have received an authorization before having a test performed.  If test is done without insurance authorization, patient may be responsible for the entire amount billed.      Precertification and Prior Authorizations:  If your physician has recommended that you have a procedure or additional testing performed the [facility]  Centralized Referral Team will contact your insurance carrier to obtain pre-certification or prior authorization.    You are strongly encouraged to contact your insurance carrier to verify that your procedure/test has been approved and is a COVERED benefit.  Although the [facility] Centralized Referral Team does its due diligence, the insurance carrier gives the disclaimer that \"Although the procedure is authorized, this does not guarantee payment.\"    Ultimately the patient is responsible for payment.   Thank you for your understanding in this matter.  Paperwork Completion:  If you require FMLA or disability paperwork for your recovery, please make sure to either drop it off or have it faxed to our office at [phone]. Be sure the form has your name and date of birth on it.  The form will be faxed to our Forms Department and they will complete it for you.  There is a 25$ fee for all forms that need to be filled out.  Please be aware there is a 10-14 day turnaround time.  You will need to sign a release of information (KEATON) form if your paperwork does not come with one.  You may call the Forms Department with any questions at [phone].  Their fax number is [phone].

## 2025-04-22 NOTE — PROGRESS NOTES
Neurology H&P    Galileo Villaseñor Patient Status:  No patient class for patient encounter    1980 MRN [id number]   [facility], [address] [place] [address] Attending No att. providers found   Hosp Day # 0 PCP eJssica Beatty,      Subjective:  Initial Clinic HPI 5/10/23  Galileo Villaseñor is a(n) 44 year old male with a PMH significant for anxiety and hydrocephalus s/p VPS. He comes to the neurology clinic for evaluation and management of sezures. 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 He had a shunt revision in childhood and then again by Dr. Aaron in . He comes with his wife today. He states that he was at a birthday party. He felt dizzy almost like an out of body experience when he entered the party. He states that sounds sounded muffled like a duglas brown cartoon. He was having difficulty functioning. He was unable to text on his phone. He was unable to form a complete thought or sentence. His wife states that he was not making any sense at all and his pupils were pinpoint. He had not had any EtOH or illicit's. His wife states that prior to getting to this party he was completely fine. His wife got him up and he was able to walk to the car. He has no recollection of this event. He was sheila to walk into the ED. He was unable to answeres any of the triage questions. He had a CTH and when brought back had a seizure. Per wife who witnessed this he made a sound like he was in pain or a groaning noise, his eyes then rolled back into his head. He then became very rigid and he started shaking all over. Per wife they gave him ativan and then this abated. He was admitted. He had 2 EEGs one of which showed frequent R temporal sharp waves per report and an MRI. He has followed up nuerosurgery as well.        Interim History:  Pt was last seen in the clinic on 9/3/24. Since that time he has not had any seizures. He has been doing well. No new numbness weakness or  tinging.  He has followed up with neurosurgery discharge for review. He is back to work and states that he is doing well. No complaints. He has been off Depakote for about 7 months.    Current Medications:  Current Outpatient Medications   Medication Sig Dispense Refill    albuterol 108 (90 Base) MCG/ACT Inhalation Aero Soln Inhale 2 puffs into the lungs every 4 (four) hours as needed for Wheezing or Shortness of Breath (cough). 1 each 0    escitalopram 10 MG Oral Tab Take 1.5 tablets (15 mg total) by mouth daily.      Multiple Vitamins-Minerals (MULTIVITAMIN ADULT EXTRA C OR) Take 1 tablet by mouth daily.      omega-3 fatty acids 1000 MG Oral Cap Take 1,000 mg by mouth daily.         Problem List:  Patient Active Problem List   Diagnosis    Hx of hydrocephalus    Family history of skin cancer    Anxiety    Shunt malfunction    Scar of neck    Seizures (HCC)    Infection of  (ventriculoperitoneal) shunt    Allergic rhinitis    Gastroesophageal reflux disease    Hydrocephalus (HCC)    Granulation tissue       PMHx:  Past Medical History:    Abdominal pain    ALCOHOL USE    Alcoholism on both sides    Allergic rhinitis    Anxiety    After performing the test asked by dr amezcua i had an anxiety    Anxiety state    Bloating    Diarrhea, unspecified    Enlarged prostate    Flatulence/gas pain/belching    Frequent urination    Heartburn    Hydrocephalus (HCC)    Indigestion    Nodule of right lung    PONV (postoperative nausea and vomiting)    S/P  shunt    REMOVED LAST YEAR- 2023 FOLLOWING INFECTION    Seizure (HCC)    Seizure disorder (HCC)    Sleep disturbance    Stress    Visual impairment    glasses    Wears glasses       PSHx:  Past Surgical History:   Procedure Laterality Date    Brain surgery  06/13/2023    Brain surgery  06/20/2023    Colonoscopy N/A 06/20/2023    Procedure: COLONOSCOPY with retreival of foreign body;  Surgeon: Anibal Hodges DO;  Location:  ENDOSCOPY    Hc implant shunt       HYDROCEPHALUS SHUNTED TWICE    Hernia surgery      Other  2021    VENTRICULOPERITONEAL SHUNT REVISION,  REVISION DISTAL CATHETER ONLY AT RIGHT ANTERIOR CERVICAL REGION (CAREY), Excision of right neck cyst, scar  ([place])    Other surgical history  , , 2021    Hydracephalus , shunts placed.  revision    Other surgical history N/A 2023    RIGHT OCCIPITAL SHUNT REMOVAL, LEFT  SHUNT EXTERNALIZATION AND  SHUNT TAP    Reconstr nose  1996    Vasectomy         SocHx:  Social History     Socioeconomic History    Marital status:    Tobacco Use    Smoking status: Former     Current packs/day: 0.00     Average packs/day: 0.5 packs/day for 5.0 years (2.5 ttl pk-yrs)     Types: Cigarettes     Start date:      Quit date: 2016     Years since quittin.3    Smokeless tobacco: Never    Tobacco comments:     Passive   Vaping Use    Vaping status: Never Used   Substance and Sexual Activity    Alcohol use: Yes     Alcohol/week: 4.0 standard drinks of alcohol     Types: 4 Cans of beer per week     Comment: SOCIAL    Drug use: No     Comment: smoked cannabis for 4 years passive    Sexual activity: Yes     Partners: Female   Other Topics Concern    Pt has a pacemaker No    Pt has a defibrillator No    Reaction to local anesthetic No    Caffeine Concern Yes     Comment: coffee daily    Exercise Yes     Comment: 2-4 days per week    Seat Belt Yes    Special Diet No    Stress Concern No    Weight Concern No     Social Drivers of Health     Food Insecurity: Low Risk  (2023)    Received from [facility]    Food Insecurity     Have there been times that your food ran out, and you didn't have money to get more?: No     Are there times that you worry that this might happen?: No   Transportation Needs: Low Risk  (2023)    Received from [facility]    Transportation Needs     Do you have trouble getting transportation to medical  appointments?: No        Family History:  Family History   Problem Relation Age of Onset    No Known Problems Father     No Known Problems Mother     Cancer Maternal Grandmother         SKIN CANCER    Heart Disease Maternal Grandmother     Alcohol and Other Disorders Associated Maternal Grandfather     Anxiety Maternal Grandfather     Heart Disease Maternal Grandfather     Cancer Paternal Grandmother         SKIN CANCER    Heart Disease Paternal Grandmother     Hypertension Paternal Grandfather     Alcohol and Other Disorders Associated Paternal Grandfather     Stroke Paternal Grandfather     Heart Disease Paternal Grandfather           ROS:  10 point ROS completed and was negative, except for pertinent positive and negatives stated in subjective.    Objective/Physical Exam:    Vital Signs:  There were no vitals taken for this visit.    Gen: Awake and in no apparent distress  HEENT: moist mucus membranes  Neck: Supple  Cardiovascular: Regular rate and rhythm, no murmur  Pulm: CTAB  GI: non-tender, normal bowel sounds  Skin: normal, dry  Extremities: No clubbing or cyanosis      Neurologic:   MENTAL STATUS: alert, ox3, normal attention, language and fund of knowledge.      CRANIAL NERVES II to XII: PERRLA, no ptosis or diplopia, EOM intact, facial sensation intact, strong eye closure, face is symmetric, no dysarthria, tongue midline,  no tongue fasciculations or atrophy, strong shoulder shrug.    MOTOR EXAMINATION: normal tone, no fasciculations, normal strength throughout in UEs and LEs      SENSORY EXAMINATION:  UE: intact to light touch, pinprick intact  LE: intact to light touch, pinprick intact    COORDINATION:  No dysmetria, or intention tremors     REFLEXES: 2+ at biceps, 3+ brachioradialis, 3+ at patella, 2+ at the ankles, + BUE Hoffmans    GAIT: normal stance,  tandem well.    Romberg's: negative      Labs:       Imaging:  [facility] 12/3/21  FINDINGS:         Right posterior temporal ventriculostomy catheter that  extends into CSF cystic space adjacent to the atria the right lateral ventricle is stable.  Right suboccipital catheter extending to the apex of the tentorium and adjacent to the straight sinus is   stable.  Left parietal ventriculostomy catheter has tip adjacent to the septum pellucidum and in the right frontal horn is stable.  Additional left parietal ventriculostomy catheter extending along the subdural space/subarachnoid space is stable.       Sequelae of left frontal parietal craniotomy and right temporal craniotomy is noted.       Dilated CSF space adjacent to the right lateral ventricle is stable.  Mild asymmetric prominence of the right temporal horn with CSF space extends over the tentorium and adjacent to the right cerebral peduncle is stable.       Arachnoid cyst adjacent to the right cerebellar hemisphere is relatively stable.  Associated mass effect is unchanged.       Cerebellar tonsillar ectopia is stable.       Mild to moderate mucoperiosteal thickening of the paranasal sinuses is stable.                                    Impression   CONCLUSION:         No significant changes since prior exam.          EEG 4/24/23  Interpretation:     This is an abnormal EEG due to frequent high amplitude slow waves in the   right temporal area, the majority of which do not appear epileptiform.     Clinical Correlation:     The EEG is improved compared to yesterday but still shows significant   neuronal dysfunction and risk for seizures from the right temporal area.     EEG 4/23/23  Interpretation:     This is an abnormal EEG due to slowing and frequent epileptiform   discharges in the right temporal area.     Clinical Correlation:     The finding indicates a significant risk for further right temporal lobe   seizures.     EEG 8/2023  IMPRESSION:  This EEG is a normal study recorded in the awake and asleep states. No focal, lateralizing, or epileptiform activity is seen and no seizures are recorded.      Report  covers  Start 8/24/23 at 1154  End 8/24/23 at 1252    IMPRESSION:  This EEG is a normal study recorded in the awake and asleep states. No focal, lateralizing, or epileptiform activity is seen and no seizures are recorded.      Report covers  Start 6/12/24 at 0713  End 6/12/24 at 0823    Assessment:  This is a 45 y/o male with a h/o a congential hydrocephalus s/p shunting and revision and a single seizure.  His most recent 2 EEGs were normal.  He reports that he is only had 1 seizure during the setting of shunt infection in the past.  Never prior to nor after this.  He has been off ASMs for aver 6 months and is doing well. Ok to hold ASMs at this time I did explain that he should go to the ED with any new seizure and he verbalized understanding.,      Plan:  1. Seizures  - Past EEGs were normal  - Off ASMs for over 6 months  - Neurosurgery notes reviewed      RTC in 12 months    Dewey Bowens DO  Neurology

## 2025-05-08 ENCOUNTER — TELEPHONE (OUTPATIENT)
Dept: FAMILY MEDICINE CLINIC | Facility: CLINIC | Age: 45
End: 2025-05-08

## 2025-05-08 DIAGNOSIS — Z00.00 WELL ADULT EXAM: Primary | ICD-10-CM

## 2025-05-21 NOTE — PROGRESS NOTES
Subjective:   Galileo Villaseñor is a 44 year old male who presents for Physical (Labs drawn this morning )       History/Other:   History of Present Illness    45 y/o M with hx of hydrocephalus and cerebral cyst here for annual physical   He had a  shunt in child smith. This had to be removed in 2023 after it got infected.   Patient has a history of hydrocephalus and cerebral cyst with shunts.   He had a seizure in 2023 due to the infection. He was on divalproex for it but no longer on this medication per neuro instruction. Patient is on xanax as needed for anxiety. He is also on lexapro 20.     Psa normal in 2023       He experiences high levels of anxiety, which he attributes to hereditary factors and life stressors. Certain anniversaries, such as the date of a past seizure in April 2023, act as triggers. His work environment is stressful, particularly due to recent layoffs and increased workload, contributing to his anxiety levels. He experiences anxiety attacks, such as one that occurred recently during work.    He is currently taking Lexapro, having increased his dose from 5 mg to 20 mg over time, with the current dose being maintained since September of the previous year. He sees his psychiatrist every three months for medication management. He does not use Xanax frequently. He has f/u with psych to further discuss.     He has gained approximately 16 pounds over the past year, which he attributes to Lexapro, stress, and occasional increased alcohol consumption. He sometimes drinks three to four beers on Fridays, which he acknowledges may contribute to his anxiety the following day. He maintains an active lifestyle, running up to six miles at a time, often as a way to manage his anxiety. No smoking.     Smoking: none; former smoker 1/2 ppd   Alcohol: occasionally   Drugs: none   Sexual hx: 1 partner   STD hx: declined  Occupation: analytics   Colonoscopy: no colon cancer in the family   There is no prostate cancer  done in the family   Tdap: 2022   Diet: healthy diet   Exercise: 3-4 times per week.     Wt Readings from Last 6 Encounters:   05/22/25 186 lb (84.4 kg)   04/22/25 186 lb (84.4 kg)   09/26/24 170 lb (77.1 kg)   09/03/24 173 lb 4.5 oz (78.6 kg)   06/11/24 170 lb (77.1 kg)   05/28/24 173 lb (78.5 kg)             Chief Complaint Reviewed and Verified  Nursing Notes Reviewed and   Verified  Tobacco Reviewed  Allergies Reviewed  Medications Reviewed    Problem List Reviewed  Medical History Reviewed  Surgical History   Reviewed  Family History Reviewed  Social History Reviewed         Tobacco:  He smoked tobacco in the past but quit greater than 12 months ago.  Tobacco Use[1]     Current Medications[2]    PHQ-2 SCORE: 0  , done 5/22/2025   Last [place] Suicide Screening on 5/22/2025 was No Risk.       Review of Systems:  Pertinent items are noted in HPI.  A comprehensive review of systems was negative.      Objective:   BP 92/60   Pulse 76   Temp 97 °F (36.1 °C) (Temporal)   Resp 16   Ht 5' 7\" (1.702 m)   Wt 186 lb (84.4 kg)   SpO2 98%   BMI 29.13 kg/m²  Estimated body mass index is 29.13 kg/m² as calculated from the following:    Height as of this encounter: 5' 7\" (1.702 m).    Weight as of this encounter: 186 lb (84.4 kg).  Results  RADIOLOGY  MRI: Stable (09/2024)     Physical Exam  MEASUREMENTS: Weight- 186, BMI- 29.0.  GENERAL: Alert, cooperative, well developed, no acute distress  HEENT: Normocephalic, normal oropharynx, moist mucous membranes  CHEST: Clear to auscultation bilaterally, no wheezes, rhonchi, or crackles  CARDIOVASCULAR: Normal heart rate and rhythm, S1 and S2 normal without murmurs  ABDOMEN: Soft, non-tender, non-distended, without organomegaly, normal bowel sounds  EXTREMITIES: No cyanosis or edema  NEUROLOGICAL: Cranial nerves grossly intact, moves all extremities without gross motor or sensory deficit      Assessment & Plan:   1. Well adult exam (Primary)-   - -Discussed diet and  exercise, counseled on vaccine and screening guidelines.     2. Hydrocephalus in diseases classified elsewhere (HCC)0- seeing neuro in 09/2025. S/p shunt removal in 2023.   3. Seizures (HCC)- resolved  4. Hx of hydrocephalus  5. Family history of skin cancer  Overview:  Melanoma and basal cell   - pt follows derm.   6. Anxiety    Assessment & Plan        Anxiety disorder  Anxiety remains high with attacks during stressful workdays. Escitalopram 20 mg has provided some improvement. Alprazolam is rarely used. Discussed Buspirone as a potential adjunct. He is aware of the situational nature of anxiety and follows up with a psychiatrist every three months.  - Continue Escitalopram 20 mg daily.  - Use Alprazolam 0.5 mg as needed for acute anxiety.  - Discuss potential use of Buspirone with psychiatrist.  - Follow up with psychiatrist in June for reassessment.    Weight gain  Weight gain of 16 pounds over the past year with BMI now 29. Possible factors include Escitalopram, diet, alcohol, and decreased activity. Exercises regularly but increased alcohol intake on weekends.  - Evaluate diet and alcohol consumption.  - Discuss weight gain with psychiatrist, considering potential medication effects.  - Monitor liver enzymes due to alcohol intake.  - Encourage continued physical activity.        No follow-ups on file.        Jessica Beatty DO, 5/21/2025, 2:49 PM           The following individual(s) verbally consented to be recorded using ambient AI listening technology and understand that they can each withdraw their consent to this listening technology at any point by asking the clinician to turn off or pause the recording:    Patient name: Galileo Villaseñor    Jessica Beatty DO        This note was prepared using Dragon Medical voice recognition dictation software. As a result errors may occur. When identified these errors have been corrected. While every attempt is made to correct errors during dictation discrepancies may  still exist.      Note to patient: The  [employer] C[employer] Act makes medical notes like these available to patients in the interest of transparency. However, be advised this is a medical document. It is intended as peer to peer communication. It is written in medical language and may contain abbreviations or verbiage that are unfamiliar. It may appear blunt or direct. Medical documents are intended to carry relevant information, facts as evident, and the clinical opinion of the practitioner.               [1]   Social History  Tobacco Use   Smoking Status Former    Current packs/day: 0.00    Average packs/day: 0.5 packs/day for 5.0 years (2.5 ttl pk-yrs)    Types: Cigarettes    Start date:     Quit date: 2016    Years since quittin.3   Smokeless Tobacco Never   Tobacco Comments    Passive   [2]   Current Outpatient Medications   Medication Sig Dispense Refill    escitalopram 20 MG Oral Tab Take 0.5 tablets (10 mg total) by mouth 2 (two) times daily.      Multiple Vitamins-Minerals (MULTIVITAMIN ADULT EXTRA C OR) Take 1 tablet by mouth daily.      omega-3 fatty acids 1000 MG Oral Cap Take 1,000 mg by mouth daily.

## 2025-05-22 ENCOUNTER — OFFICE VISIT (OUTPATIENT)
Dept: FAMILY MEDICINE CLINIC | Facility: CLINIC | Age: 45
End: 2025-05-22
Payer: COMMERCIAL

## 2025-05-22 ENCOUNTER — LAB ENCOUNTER (OUTPATIENT)
Dept: LAB | Age: 45
End: 2025-05-22
Attending: FAMILY MEDICINE
Payer: COMMERCIAL

## 2025-05-22 VITALS
BODY MASS INDEX: 29.19 KG/M2 | HEART RATE: 76 BPM | DIASTOLIC BLOOD PRESSURE: 60 MMHG | TEMPERATURE: 97 F | RESPIRATION RATE: 16 BRPM | SYSTOLIC BLOOD PRESSURE: 92 MMHG | HEIGHT: 67 IN | WEIGHT: 186 LBS | OXYGEN SATURATION: 98 %

## 2025-05-22 DIAGNOSIS — Z00.00 WELL ADULT EXAM: ICD-10-CM

## 2025-05-22 DIAGNOSIS — F41.9 ANXIETY: ICD-10-CM

## 2025-05-22 DIAGNOSIS — Z80.8 FAMILY HISTORY OF SKIN CANCER: ICD-10-CM

## 2025-05-22 DIAGNOSIS — G91.4 HYDROCEPHALUS IN DISEASES CLASSIFIED ELSEWHERE (HCC): ICD-10-CM

## 2025-05-22 DIAGNOSIS — Z00.00 WELL ADULT EXAM: Primary | ICD-10-CM

## 2025-05-22 DIAGNOSIS — Z86.69 HX OF HYDROCEPHALUS: ICD-10-CM

## 2025-05-22 DIAGNOSIS — R56.9 SEIZURES (HCC): ICD-10-CM

## 2025-05-22 LAB
ALBUMIN SERPL-MCNC: 4.9 G/DL (ref 3.2–4.8)
ALBUMIN/GLOB SERPL: 2.2 {RATIO} (ref 1–2)
ALP LIVER SERPL-CCNC: 51 U/L (ref 45–117)
ALT SERPL-CCNC: 18 U/L (ref 10–49)
ANION GAP SERPL CALC-SCNC: 7 MMOL/L (ref 0–18)
AST SERPL-CCNC: 26 U/L (ref ?–34)
BASOPHILS # BLD AUTO: 0.07 X10(3) UL (ref 0–0.2)
BASOPHILS NFR BLD AUTO: 1 %
BILIRUB SERPL-MCNC: 1 MG/DL (ref 0.3–1.2)
BUN BLD-MCNC: 15 MG/DL (ref 9–23)
CALCIUM BLD-MCNC: 9.4 MG/DL (ref 8.7–10.6)
CHLORIDE SERPL-SCNC: 106 MMOL/L (ref 98–112)
CHOLEST SERPL-MCNC: 197 MG/DL (ref ?–200)
CO2 SERPL-SCNC: 26 MMOL/L (ref 21–32)
CREAT BLD-MCNC: 1.06 MG/DL (ref 0.7–1.3)
EGFRCR SERPLBLD CKD-EPI 2021: 90 ML/MIN/1.73M2 (ref 60–?)
EOSINOPHIL # BLD AUTO: 0.15 X10(3) UL (ref 0–0.7)
EOSINOPHIL NFR BLD AUTO: 2.2 %
ERYTHROCYTE [DISTWIDTH] IN BLOOD BY AUTOMATED COUNT: 12.1 %
FASTING PATIENT LIPID ANSWER: YES
FASTING STATUS PATIENT QL REPORTED: YES
GLOBULIN PLAS-MCNC: 2.2 G/DL (ref 2–3.5)
GLUCOSE BLD-MCNC: 87 MG/DL (ref 70–99)
HCT VFR BLD AUTO: 46.7 % (ref 39–53)
HDLC SERPL-MCNC: 66 MG/DL (ref 40–59)
HGB BLD-MCNC: 15.9 G/DL (ref 13–17.5)
IMM GRANULOCYTES # BLD AUTO: 0.02 X10(3) UL (ref 0–1)
IMM GRANULOCYTES NFR BLD: 0.3 %
LDLC SERPL CALC-MCNC: 117 MG/DL (ref ?–100)
LYMPHOCYTES # BLD AUTO: 2.24 X10(3) UL (ref 1–4)
LYMPHOCYTES NFR BLD AUTO: 32.3 %
MCH RBC QN AUTO: 30.5 PG (ref 26–34)
MCHC RBC AUTO-ENTMCNC: 34 G/DL (ref 31–37)
MCV RBC AUTO: 89.6 FL (ref 80–100)
MONOCYTES # BLD AUTO: 0.72 X10(3) UL (ref 0.1–1)
MONOCYTES NFR BLD AUTO: 10.4 %
NEUTROPHILS # BLD AUTO: 3.74 X10 (3) UL (ref 1.5–7.7)
NEUTROPHILS # BLD AUTO: 3.74 X10(3) UL (ref 1.5–7.7)
NEUTROPHILS NFR BLD AUTO: 53.8 %
NONHDLC SERPL-MCNC: 131 MG/DL (ref ?–130)
OSMOLALITY SERPL CALC.SUM OF ELEC: 288 MOSM/KG (ref 275–295)
PLATELET # BLD AUTO: 231 10(3)UL (ref 150–450)
POTASSIUM SERPL-SCNC: 4.2 MMOL/L (ref 3.5–5.1)
PROT SERPL-MCNC: 7.1 G/DL (ref 5.7–8.2)
RBC # BLD AUTO: 5.21 X10(6)UL (ref 4.3–5.7)
SODIUM SERPL-SCNC: 139 MMOL/L (ref 136–145)
TRIGL SERPL-MCNC: 77 MG/DL (ref 30–149)
VLDLC SERPL CALC-MCNC: 13 MG/DL (ref 0–30)
WBC # BLD AUTO: 6.9 X10(3) UL (ref 4–11)

## 2025-05-22 PROCEDURE — 80053 COMPREHEN METABOLIC PANEL: CPT

## 2025-05-22 PROCEDURE — 80061 LIPID PANEL: CPT

## 2025-05-22 PROCEDURE — 36415 COLL VENOUS BLD VENIPUNCTURE: CPT

## 2025-05-22 PROCEDURE — 85025 COMPLETE CBC W/AUTO DIFF WBC: CPT

## 2025-08-25 ENCOUNTER — TELEPHONE (OUTPATIENT)
Dept: SURGERY | Facility: CLINIC | Age: 45
End: 2025-08-25

## 2025-08-25 DIAGNOSIS — T85.730D INFECTION OF VENTRICULOPERITONEAL SHUNT, SUBSEQUENT ENCOUNTER: ICD-10-CM

## 2025-08-25 DIAGNOSIS — R56.9 SEIZURE (HCC): Primary | ICD-10-CM

## 2025-08-25 DIAGNOSIS — G04.90: ICD-10-CM

## 2025-08-25 DIAGNOSIS — R41.3 MEMORY DIFFICULTY: ICD-10-CM

## 2025-08-25 DIAGNOSIS — R22.1 NECK SWELLING: ICD-10-CM

## 2025-08-25 DIAGNOSIS — Z86.69 HX OF HYDROCEPHALUS: ICD-10-CM

## (undated) DEVICE — ENDOSCOPY PACK - LOWER: Brand: MEDLINE INDUSTRIES, INC.

## (undated) DEVICE — SUPER ATRAUMATIC MODIFIED GRASPER TIP, DISPOSABLE: Brand: RENEW

## (undated) DEVICE — SUT ETHILON 2-0 FSLX 1674H

## (undated) DEVICE — DRAIN CHANNEL 19FR BLAKE

## (undated) DEVICE — CODMAN® DISPOSABLE PERFORATOR 14MM: Brand: CODMAN®

## (undated) DEVICE — ENDOPATH ECHELON ENDOSCOPIC LINEAR CUTTER RELOADS, BLUE, 60MM: Brand: ECHELON ENDOPATH

## (undated) DEVICE — DISPOSABLE BIPOLAR FORCEPS 4" (10.2CM) JEWELERS, STRAIGHT 0.4MM TIP AND 12 FT. (3.6M) CABLE: Brand: KIRWAN

## (undated) DEVICE — 3M™ STERI-STRIP™ REINFORCED ADHESIVE SKIN CLOSURES, R1547, 1/2 IN X 4 IN (12 MM X 100 MM), 6 STRIPS/ENVELOPE: Brand: 3M™ STERI-STRIP™

## (undated) DEVICE — SUT PROLENE 5-0 RB-1 8556H

## (undated) DEVICE — DRESSING BIOPATCH 1X4 BLUE

## (undated) DEVICE — COVER,MAYO STAND,STERILE: Brand: MEDLINE

## (undated) DEVICE — MARKER SKIN PREP RESIST STRL

## (undated) DEVICE — SUT SILK 2-0 SH K833H

## (undated) DEVICE — STERILE POLYISOPRENE POWDER-FREE SURGICAL GLOVES: Brand: PROTEXIS

## (undated) DEVICE — PEN SKIN MARKING REG TIP VIOLT

## (undated) DEVICE — SUTURE VICRYL 3-0 RB-1

## (undated) DEVICE — BANDAGE,GAUZE,BULKEE II,4.5"X4.1YD,STRL: Brand: MEDLINE

## (undated) DEVICE — SHEET, DRAPE, SPLIT, STERILE: Brand: MEDLINE

## (undated) DEVICE — SUTURE SILK 0

## (undated) DEVICE — FLOSEAL WITH RECOTHROM - 5ML: Brand: FLOSEAL HEMOSTATIC MATRIX

## (undated) DEVICE — ECHELON 3000 60MM STANDARD: Brand: ECHELON

## (undated) DEVICE — LAPAROTOMY SPONGE - RF AND X-RAY DETECTABLE PRE-WASHED: Brand: SITUATE

## (undated) DEVICE — SCD SLEEVE KNEE HI BLEND

## (undated) DEVICE — SOLUTION SURG DURA PREP HAZMAT

## (undated) DEVICE — SUTURE ETHILON 3-0 PS-1

## (undated) DEVICE — PACK DRAPE HEAD/NECK STER

## (undated) DEVICE — 3M™ RED DOT™ MONITORING ELECTRODE WITH FOAM TAPE AND STICKY GEL, 50/BAG, 20/CASE, 72/PLT 2570: Brand: RED DOT™

## (undated) DEVICE — SUT SILK 2-0 SH C012D

## (undated) DEVICE — LIGHT HANDLE

## (undated) DEVICE — COVER LT HNDL RIG FOR SUR CAM DISP

## (undated) DEVICE — 3.0MM PRECISION NEURO (MATCH HEAD)

## (undated) DEVICE — WOUND RETRACTOR AND PROTECTOR: Brand: ALEXIS O WOUND PROTECTOR-RETRACTOR

## (undated) DEVICE — PROXIMATE RELOADABLE LINEAR STAPLER: Brand: PROXIMATE

## (undated) DEVICE — SOLUTION SURG DURAPREP 26ML

## (undated) DEVICE — KIT ENDO ORCAPOD 160/180/190

## (undated) DEVICE — GAUZE TRAY STERILE 4X4 12PLY

## (undated) DEVICE — 10FT COMBINED O2 DELIVERY/CO2 MONITORING. FILTER WITH MICROSTREAM TYPE LUER: Brand: DUAL ADULT NASAL CANNULA

## (undated) DEVICE — DRESS WOUND AQUACEL 3.5INX12IN

## (undated) DEVICE — LAPAROVUE VISIBILITY SYSTEM LAPAROSCOPIC SOLUTIONS: Brand: LAPAROVUE

## (undated) DEVICE — SUT VICRYL 3-0 RB-1 J215H

## (undated) DEVICE — ELECTRODE ES 2.75IN PTFE BLDE MOD E-Z CLN

## (undated) DEVICE — STERILE POLYISOPRENE POWDER-FREE SURGICAL GLOVES WITH EMOLLIENT COATING: Brand: PROTEXIS

## (undated) DEVICE — TRADITIONAL MARYLAND DISSECTOR TIP, DISPOSABLE: Brand: RENEW

## (undated) DEVICE — TROCAR: Brand: KII FIOS FIRST ENTRY

## (undated) DEVICE — SYRINGE 10ML LL CONTRL SYRINGE

## (undated) DEVICE — #11 STERILE BLADE: Brand: POLYMER COATED BLADES

## (undated) DEVICE — SNARE 9MM 230CM 2.4MM EXACTO

## (undated) DEVICE — STIMULATOR NERVE CHECKPOINT

## (undated) DEVICE — BIOGUARD CLEANING ADAPTER

## (undated) DEVICE — STANDARD HYPODERMIC NEEDLE,POLYPROPYLENE HUB: Brand: MONOJECT

## (undated) DEVICE — 1200CC GUARDIAN II: Brand: GUARDIAN

## (undated) DEVICE — VIOLET BRAIDED (POLYGLACTIN 910), SYNTHETIC ABSORBABLE SUTURE: Brand: COATED VICRYL

## (undated) DEVICE — ACCUDRAIN® EXTERNAL CSF DRAINAGE SYSTEM: Brand: ACCUDRAIN®

## (undated) DEVICE — ALCOHOL 70% 4 OZ

## (undated) DEVICE — SUT PDS II 1 TP-1 D8020

## (undated) DEVICE — GAUZE SPONGES,8 PLY: Brand: CURITY

## (undated) DEVICE — SUTURE ETHILON 4-0 PS-2

## (undated) DEVICE — 3M™ IOBAN™ 2 ANTIMICROBIAL INCISE DRAPE 6648EZ: Brand: IOBAN™ 2

## (undated) DEVICE — SUTURE MONOCRYL 4-0 PS-2

## (undated) DEVICE — SOLUTION IRRIG 1000ML 0.9% NACL USP BTL

## (undated) DEVICE — CAUTERY BLADE 2IN INS E1455

## (undated) DEVICE — ANTIBACTERIAL UNDYED BRAIDED (POLYGLACTIN 910), SYNTHETIC ABSORBABLE SUTURE: Brand: COATED VICRYL

## (undated) DEVICE — TOWEL SURG OR 17X30IN BLUE

## (undated) DEVICE — CASED DISP BIPOLAR CORD

## (undated) DEVICE — MEGADYNE ELECTRODE ADULT PT RT

## (undated) DEVICE — SUTURE VICRYL 4-0 RB-1

## (undated) DEVICE — GLOVE SUR 6.5 SENSICARE PI PIP CRM PWD F

## (undated) DEVICE — BORDERED GAUZE DRESSING 4X4

## (undated) DEVICE — GLOVE SUR 7 SENSICARE PI PIP GRN PWD F

## (undated) DEVICE — SOLUTION PREP 30ML 5% POVIDONE IOD EYE BDINE

## (undated) DEVICE — GEL US 20 GM PKT ST AQSNC 100

## (undated) DEVICE — SUT ETHILON 3-0 PS-2 1669H

## (undated) DEVICE — ENDOCUT SCISSOR TIP, DISPOSABLE: Brand: RENEW

## (undated) DEVICE — SUT VICRYL 1 CPX J569H

## (undated) DEVICE — PROXIMATE SKIN STAPLERS (35 WIDE) CONTAINS 35 STAINLESS STEEL STAPLES (FIXED HEAD): Brand: PROXIMATE

## (undated) DEVICE — EVACUATOR URO RELIVAC 100CC

## (undated) DEVICE — PROBE COVER 600 SER 05031-110

## (undated) DEVICE — SNARE CAPTIFLEX STD OVAL OLY

## (undated) DEVICE — SYRINGE MED 10ML LL CTRL W/ FNGR GRP CLR BRL

## (undated) DEVICE — HEAD AND NECK CDS-LF: Brand: MEDLINE INDUSTRIES, INC.

## (undated) DEVICE — REM POLYHESIVE ADULT PATIENT RETURN ELECTRODE: Brand: VALLEYLAB

## (undated) DEVICE — 3M™ IOBAN™ 2 ANTIMICROBIAL INCISE DRAPE 6650EZ: Brand: IOBAN™ 2

## (undated) DEVICE — Device: Brand: INTELLICART™

## (undated) DEVICE — SUT PROL 3-0 36IN V-5 NABSRB BLU L17MM 1/2 CI

## (undated) DEVICE — SUT VICRYL 2-0 CT-2 J269H

## (undated) DEVICE — SOL NACL IRRIG 0.9% 1000ML BTL

## (undated) DEVICE — LAMINECTOMY CDS: Brand: MEDLINE INDUSTRIES, INC.

## (undated) DEVICE — PROXIMATE RH ROTATING HEAD SKIN STAPLERS (35 WIDE) CONTAINS 35 STAINLESS STEEL STAPLES: Brand: PROXIMATE

## (undated) DEVICE — ENSEAL X1 TISSUE SEALER, CURVED JAW, 25 CM SHAFT LENGTH: Brand: ENSEAL

## (undated) DEVICE — STERILE SYNTHETIC POLYISOPRENE POWDER-FREE SURGICAL GLOVES WITH HYDROGEL COATING: Brand: PROTEXIS

## (undated) DEVICE — SUT PROL SZ 5-0 36IN RB-1 NABSRB BL

## (undated) DEVICE — FENESTRATED GRASPER TIP, DISPOSABLE: Brand: RENEW

## (undated) DEVICE — PISTOL GRIP SKIN STAPLER: Brand: MULTIFIRE PREMIUM

## (undated) DEVICE — CABLE BPLR L12FT FLYING LD DISP

## (undated) DEVICE — CODMAN BACTISEAL EVD CATH SET

## (undated) DEVICE — 1016 S-DRAPE IRRIG POUCH 10/BOX: Brand: STERI-DRAPE™

## (undated) DEVICE — SUTURE VICRYL 3-0 SH

## (undated) DEVICE — 3M™ TEGADERM™ +PAD FILM DRESSING WITH NON-ADHERENT PAD, 3587, 3-1/2 IN X 4-1/8 IN (9 CM X 10.5 CM), 25/CAR, 4 CAR/CS: Brand: 3M™ TEGADERM™

## (undated) DEVICE — SKIN STAPLE REMOVER KIT: Brand: MEDLINE INDUSTRIES, INC.

## (undated) DEVICE — PROXIMATE LINEAR STAPLER RELOADS: Brand: PROXIMATE

## (undated) DEVICE — SUT PDS II 0 CT-1 Z340H

## (undated) DEVICE — EXOFIN TISSUE ADHESIVE 1.0ML

## (undated) DEVICE — SUTURE VICRYL 5-0 RB-1

## (undated) DEVICE — SLEEVE KENDALL SCD EXPRESS MED

## (undated) DEVICE — SOL  .9 1000ML BTL

## (undated) DEVICE — CAUTERY PENCIL

## (undated) DEVICE — UNDYED BRAIDED (POLYGLACTIN 910), SYNTHETIC ABSORBABLE SUTURE: Brand: COATED VICRYL

## (undated) NOTE — MR AVS SNAPSHOT
After Visit Summary   6/12/2024    Galileo Villaseñor   MRN: [id number]           Visit Information     Date & Time  6/12/2024  7:30 AM Provider  [id number] Department  [facility] EEG Dept. Phone  [phone]      Allergies as of 6/12/2024  Review status set to Review Complete on 6/11/2024       Noted Reaction Type Reactions    Penicillins 06/16/2016    HIVES      Your Current Medications        Dosage    divalproex  MG Oral Tablet 24 Hr Take 2 tablets (500 mg total) by mouth daily. Take two tablets (500 mg) daily until pt completes surgical procedure on 5/28/2024    escitalopram 10 MG Oral Tab Take 1 tablet (10 mg total) by mouth daily.    Multiple Vitamins-Minerals (MULTIVITAMIN ADULT EXTRA C OR) Take 1 tablet by mouth daily.    omega-3 fatty acids 1000 MG Oral Cap Take 1,000 mg by mouth daily.      Diagnoses for This Visit    Seizures   [[id number]]             We Ordered the Following     Normal Orders This Visit    EEG (At [facility]) [NEU4 CUSTOM]       Future Appointments        Provider Department    6/12/2024 10:00 AM [facility] Therapy    6/17/2024 10:45 AM [facility]    6/24/2024 10:45 AM [facility]    7/1/2024 11:30 AM [facility]    7/8/2024 11:30 AM [facility] Therapy    7/15/2024 11:30 AM [facility] Speech [facility] Language Therapy    7/17/2024 9:30 AM Fanny Ackerman [facility]    9/23/2024 7:45 AM  MRI 1 (1.5T) [facility]    9/26/2024 8:00 AM Viraj Good [facility]                Did you know that [facility] primary care physicians now offer Video Visits through [facility] for adult patients for a variety of conditions such as allergies, back  pain and cold symptoms? Skip the drive and waiting room and online chat with a doctor face-to-face using your web-cam enabled computer or mobile device wherever you are. Video Visits cost $50 and can be paid hassle-free using a credit, debit, or health savings card.  Not active on [employer]? Ask us how to get signed up today!          If you receive a survey from [employer], please take a few minutes to complete it and provide feedback. We strive to deliver the best patient experience and are looking for ways to make improvements. Your feedback will help us do so. For more information on [employer], please visit [email or web address]           No text in SmartText           No text in SmartText

## (undated) NOTE — LETTER
[facility]  [facility] [place], [address] [place] [id number]  [facility]: [phone]  FAX: [phone]         Medical Clearance Request    Dr. Ck Garvin    The patient listed below is scheduled for surgery and needs the follow

## (undated) NOTE — LETTER
Patient Name: Galileo Villaseñor  YOB: 1980          MRN number:  [id number]  Date:  8/21/2024  Referring Physician:  Viraj Good    Discharge Summary  Pt has attended 20 visits in Speech Therapy.     Dear Dr. Good,  This letter is to inform you of Galileo Villaseñor's progress in speech-language therapy.    Since his initial evaluation, Galileo has attended 20 sessions. Therapy sessions have targeted cognitive-communication. A home exercise program (HEP) addressing use of compensatory strategies (internal and external supports) and functional exercises has been provided and completed consistently. During this treatment period, Galileo has demonstrated improved use of strategies and carryover of skills for home and job related tasks. As he has demonstrated significant progress and has met all goals, it is recommended that he be discharged from speech therapy at this time.    Subjective: Patient arrived on time to session. Patient participated actively in therapeutic tasks.     Pain: No pain reported on this date. Patient not being seen for pain.    Objective:  Goals: (to be met in 8 additional visits)   STG 1: Patient will independently verbalize compensatory memory strategies (processing, working memory, short-term memory, attention, problem-solving) and describe 1 use per strategy.  Progress: Patient verbalized understanding of compensatory strategies and external supports independently. Goal met.    STG 2: Patient will recall novel paragraph length stimuli with 90% accuracy following a 20 minute delay, with min verbal and visual cues for use of compensatory memory strategies.  Progress: 90% following 20 minute delay given min verbal cues. Goal met.    STG 3: Patient will accurately complete working memory tasks within 90% of opportunities given min verbal and visual cueing.  Progress: 90% given min verbal and visual cueing. Patient increased accuracy given opportunities to practice and discussion of  supports. Goal met.    STG 4B: Patient will utilize accurate planning and note-taking skills within 90% of opportunities independently.  Progress: Patient utilized planning and note-taking skills within 95% of opportunities independently. Goal met.    STG 5B: Patient will divide attention between two higher level complexity tasks given less than 1 verbal redirections/reminders.  Progress: Required average of 1 verbal redirections/reminders. Goal met.    The [place] Cognitive Assessment (MoCA) was utilized to assess patient's cognitive-communication skills within the following domains: Visuospatial/executive function, naming, memory, attention, language, abstraction/reasoning, delayed recall, and orientation. The results are as follows: 28/30 indicating normal cognitive function (Normal range is >26/30). Delayed recall 4/5 items. Difficulty orientating to date.    MoCA Scores  26 to 30=WNL  18-25=mild  10 to 17=moderate  Less than 10=severe    HEP: Home working memory tasks  Education: Use of compensatory strategies    Assessment: Patient has demonstrated significant progress toward goals from SOC. Patient has increased independence with use of compensatory strategies and supports to facilitate task completion, accuracy, and efficiency related to job and home life. Patient continues to benefit from these supports, however is independent with use and no longer requires skilled cueing. As such, it is recommended that he be discharged from speech therapy with recommendations for continued compliance to HEP.      Plan: Patient to be discharged from speech-language therapy as all goals have been met.     Patient/Family/Caregiver was advised of these findings, precautions, and treatment options and has agreed to actively participate in planning and for this course of care.    Thank you for your referral. If you have any questions, please contact me at Dept: [phone].    Sincerely,  Electronically signed by therapist:  YONG Johnson         21st Century Cures Act Notice to Patient: Medical documents like this are made available to patients in the interest of transparency. However, be advised this is a medical document and it is intended as [id number] communication between your medical providers. This medical document may contain abbreviations, assessments, medical data, and results or other terms that are unfamiliar. Medical documents are intended to carry relevant information, facts as evident, and the clinical opinion of the practitioner. As such, this medical document may be written in language that appears blunt or direct. You are encouraged to contact your medical provider and/or [facility] if you have any questions about this medical document.

## (undated) NOTE — LETTER
24        Re: Galileo Villaseñor  : 1980    To Whom It May Concern,    This patient is being followed by my office at [facility] for treatment of a neurologic condition.  There are no contraindications from Neurology standpoint for the above patient to have surgery done.    Please note this is only a Neurological clearance, which does not replace necessary clearances from other specialties such as internal medicine, Cardiology, or Pulmonary medicine.     Sincerely,          Dewey Bowens, DO  Neurology  [facility]

## (undated) NOTE — MR AVS SNAPSHOT
After Visit Summary   2023    Karishma Millan   MRN: [id number]           Visit Information     Date & Time  2023 12:00 PM Provider  [address] [place] [address] EEG Dept. Phone  [phone]      Allergies as of 2023  Review status set to Review Complete on 2023       Noted Reaction Type Reactions    Penicillins 2016    HIVES      Your Current Medications        Dosage    divalproex  MG Oral Tablet 24 Hr Take 2 tablets (500 mg total) by mouth daily. ALPRAZolam 0.5 MG Oral Tab Take 1 tablet (0.5 mg total) by mouth nightly as needed. acetaminophen-codeine (TYLENOL WITH CODEINE #3) 300-30 MG Oral Tab Take 1 tablet by mouth every 4 (four) hours as needed for Pain.    sodium chloride 0.9% SOLN 250 mL with imipenem-cilastatin 500 MG SOLR 1,000 mg () Inject 1,000 mg into the vein every 8 (eight) hours for 20 days. CBC, CMP, CRP weekly    acetaminophen 500 MG Oral Tab Take 2 tablets (1,000 mg total) by mouth every 6 (six) hours as needed for Pain. Naloxone HCl 4 MG/0.1ML Nasal Liquid 4 mg by Nasal route as needed. If patient remains unresponsive, repeat dose in other nostril 2-5 minutes after first dose. Multiple Vitamins-Minerals (MULTIVITAMIN ADULT EXTRA C OR) Take 1 tablet by mouth daily. omega-3 fatty acids 1000 MG Oral Cap Take 1,000 mg by mouth daily.       Diagnoses for This Visit    Seizures   [[id number]]             We Ordered the Following     Normal Orders This Visit    EEG [NEU4 CUSTOM]       Future Appointments        Provider Department    2023 1:15 PM Valene Fam [facility] Speech and Language Therapy    2023 10:00 AM Valene Fam [facility] Speech and Language Therapy    2023 10:15 AM  MRI RM1 (1.5T) [facility] MRI - [place]    2023 1:15 PM Emery Mireles Speech and Language Therapy    2023 9:00 AM Valene Fam [facility] Speech and Language Therapy    2023 11:30 AM Saundra Webber [facility] Speech and Language Therapy    9/14/2023 9:45 AM Josey Quant [facility] Speech and Language Therapy    9/18/2023 11:30 AM Josey Quant [facility] Speech and Language Therapy    9/21/2023 10:30 AM Josey Quant [facility] Speech and Language Therapy    12/1/2023 10:40 [facility] [address], [place]    1/12/2024 7:30 AM JOE [place]    2/9/2024 8:30 AM Jany [facility] [address] [facility], [place]                Did you know that [facility] primary care physicians now offer Video Visits through [address] for adult patients for a variety of conditions such as allergies, back pain and cold symptoms? Skip the drive and waiting room and online chat with a doctor face-to-face using your web-cam enabled computer or mobile device wherever you are. Video Visits cost $50 and can be paid hassle-free using a credit, debit, or health savings card. Not active on [employer]? Ask us how to get signed up today! If you receive a survey from [employer], please take a few minutes to complete it and provide feedback. We strive to deliver the best patient experience and are looking for ways to make improvements. Your feedback will help us do so. For more information on [employer], please visit [email or web address]           No text in SmartText           No text in SmartText

## (undated) NOTE — LETTER
Date: 5/17/2024    Patient Name: Galileo Villaseñor          To Whom it may concern:    This letter has been written at the patient's request. The above patient was seen at [facility] for treatment of a medical condition.    This patient should be restricted from driving until seen in the clinic at their next visit.         Sincerely,    Dewey Bowens, DO

## (undated) NOTE — LETTER
[facility] [address], [address]  Consent for Procedure/Sedation  Date: ***         Time: ***    I hereby authorize ***, my physician and his/her assistants (if applicable), which may include medical students, residents, and/or fellows, to perform the following surgical operation/ procedure and administer such anesthesia as may be determined necessary by my physician: *** on [address]  2. I recognize that during the surgical operation/procedure, unforeseen conditions may necessitate additional or different procedures than those listed above. I, therefore, further authorize and request that the above-named surgeon, assistants, or designees perform such procedures as are, in their judgment, necessary and desirable. 3.   My surgeon/physician has discussed prior to my surgery the potential benefits, risks and side effects of this procedure; the likelihood of achieving goals; and potential problems that might occur during recuperation. They also discussed reasonable alternatives to the procedure, including risks, benefits, and side effects related to the alternatives and risks related to not receiving this procedure. I have had all my questions answered and I acknowledge that no guarantee has been made as to the result that may be obtained. 4.   Should the need arise during my operation/procedure, which includes change of level of care prior to discharge, I also consent to the administration of blood and/or blood products. Further, I understand that despite careful testing and screening of blood or blood products by collecting agencies, I may still be subject to ill effects as a result of receiving a blood transfusion and/or blood products. The following are some, but not all, of the potential risks that can occur: fever and allergic reactions, hemolytic reactions, transmission of diseases such as Hepatitis, AIDS and Cytomegalovirus (CMV) and fluid overload.   In the event that I wish to have an autologous transfusion of my own blood, or a directed donor transfusion, I will discuss this with my physician. Check only if Refusing Blood or Blood Products  I understand refusal of blood or blood products as deemed necessary by my physician may have serious consequences to my condition to include possible death. I hereby assume responsibility for my refusal and release the hospital, its personnel, and my physicians from any responsibility for the consequences of my refusal.         o  Refuse         5. I authorize the use of any specimen, organs, tissues, body parts or foreign objects that may be removed from my body during the operation/procedure for diagnosis, research or teaching purposes and their subsequent disposal by hospital authorities. I also authorize the release of specimen test results and/or written reports to my treating physician on the hospital medical staff or other referring or consulting physicians involved in my care, at the discretion of the Pathologist or my treating physician. 6.   I consent to the photographing or videotaping of the operations or procedures to be performed, including appropriate portions of my body for medical, scientific, or educational purposes, provided my identity is not revealed by the pictures or by descriptive texts accompanying them. If the procedure has been photographed/videotaped, the surgeon will obtain the original picture, image, videotape or CD. The hospital will not be responsible for storage, release or maintenance of the picture, image, tape or CD.    7.   I consent to the presence of a  or observers in the operating room as deemed necessary by my physician or their designees. 8.   I recognize that in the event my procedure results in extended X-Ray/fluoroscopy time, I may develop a skin reaction. 9.  If I have a Do Not Attempt Resuscitation (DNAR) order in place, that status will be suspended while in the operating room, procedural suite, and during the recovery period unless otherwise explicitly stated by me (or a person authorized to consent on my behalf). The surgeon or my attending physician will determine when the applicable recovery period ends for purposes of reinstating the DNAR order. 10. Patients having a sterilization procedure: I understand that if the procedure is successful the results will be permanent and it will therefore be impossible for me to inseminate, conceive, or bear children. I also understand that the procedure is intended to result in sterility, although the result has not been guaranteed. 11. I acknowledge that my physician has explained sedation/analgesia administration to me including the risk and benefits I consent to the administration of sedation/analgesia as may be necessary or desirable in the judgment of my physician.     I CERTIFY THAT I HAVE READ AND FULLY UNDERSTAND THE ABOVE CONSENT TO OPERATION and/or OTHER PROCEDURE.        ____________________________________       _________________________________      ______________________________  Signature of Patient         Signature of Responsible Person        Printed Name of Responsible Person        ____________________________________      _________________________________      ______________________________       Signature of Witness          Relationship to Patient                       Date                                       Time  Patient Name: Malachi Azar     : 1980                 Printed: 2023      Medical Record #: [id number]                      Page 1 of 1

## (undated) NOTE — LETTER
[facility] [address], [address] [place] [address]  Consent for Procedure/Sedation  Date: 6/20/2023         Time: 0945    I hereby authorize Dr. Ariana Ford, my physician and his/her assistants (if applicable), which may include medical students, residents, and/or fellows, to perform the following surgical operation/ procedure and administer such anesthesia as may be determined necessary by my physician: Colonoscopy on [address]  2. I recognize that during the surgical operation/procedure, unforeseen conditions may necessitate additional or different procedures than those listed above. I, therefore, further authorize and request that the above-named surgeon, assistants, or designees perform such procedures as are, in their judgment, necessary and desirable. 3.   My surgeon/physician has discussed prior to my surgery the potential benefits, risks and side effects of this procedure; the likelihood of achieving goals; and potential problems that might occur during recuperation. They also discussed reasonable alternatives to the procedure, including risks, benefits, and side effects related to the alternatives and risks related to not receiving this procedure. I have had all my questions answered and I acknowledge that no guarantee has been made as to the result that may be obtained. 4.   Should the need arise during my operation/procedure, which includes change of level of care prior to discharge, I also consent to the administration of blood and/or blood products. Further, I understand that despite careful testing and screening of blood or blood products by collecting agencies, I may still be subject to ill effects as a result of receiving a blood transfusion and/or blood products.   The following are some, but not all, of the potential risks that can occur: fever and allergic reactions, hemolytic reactions, transmission of diseases such as Hepatitis, AIDS and Cytomegalovirus (CMV) and fluid overload. In the event that I wish to have an autologous transfusion of my own blood, or a directed donor transfusion, I will discuss this with my physician. Check only if Refusing Blood or Blood Products  I understand refusal of blood or blood products as deemed necessary by my physician may have serious consequences to my condition to include possible death. I hereby assume responsibility for my refusal and release the hospital, its personnel, and my physicians from any responsibility for the consequences of my refusal.         o  Refuse         5. I authorize the use of any specimen, organs, tissues, body parts or foreign objects that may be removed from my body during the operation/procedure for diagnosis, research or teaching purposes and their subsequent disposal by hospital authorities. I also authorize the release of specimen test results and/or written reports to my treating physician on the hospital medical staff or other referring or consulting physicians involved in my care, at the discretion of the Pathologist or my treating physician. 6.   I consent to the photographing or videotaping of the operations or procedures to be performed, including appropriate portions of my body for medical, scientific, or educational purposes, provided my identity is not revealed by the pictures or by descriptive texts accompanying them. If the procedure has been photographed/videotaped, the surgeon will obtain the original picture, image, videotape or CD. The hospital will not be responsible for storage, release or maintenance of the picture, image, tape or CD.    7.   I consent to the presence of a  or observers in the operating room as deemed necessary by my physician or their designees. 8.   I recognize that in the event my procedure results in extended X-Ray/fluoroscopy time, I may develop a skin reaction. 9.  If I have a Do Not Attempt Resuscitation (DNAR) order in place, that status will be suspended while in the operating room, procedural suite, and during the recovery period unless otherwise explicitly stated by me (or a person authorized to consent on my behalf). The surgeon or my attending physician will determine when the applicable recovery period ends for purposes of reinstating the DNAR order. 10. Patients having a sterilization procedure: I understand that if the procedure is successful the results will be permanent and it will therefore be impossible for me to inseminate, conceive, or bear children. I also understand that the procedure is intended to result in sterility, although the result has not been guaranteed. 11. I acknowledge that my physician has explained sedation/analgesia administration to me including the risk and benefits I consent to the administration of sedation/analgesia as may be necessary or desirable in the judgment of my physician.     I CERTIFY THAT I HAVE READ AND FULLY UNDERSTAND THE ABOVE CONSENT TO OPERATION and/or OTHER PROCEDURE.        ____________________________________       _________________________________      ______________________________  Signature of Patient         Signature of Responsible Person        Printed Name of Responsible Person        ____________________________________      _________________________________      ______________________________       Signature of Witness          Relationship to Patient                       Date                                       Time  Patient Name: Sasha Blecher     : 1980                 Printed: 2023      Medical Record #: [id number]                      Page 1 of 1

## (undated) NOTE — LETTER
21    Zina Conrad  [facility] 3-    Dr. Gaurang Schmitz,    This patient called our office stating he requires a cyst removed from an area near his  shunt.   Dr Jefferson Li, neurosurgeon, is suggesting that you order a CT of head and neck to evaluate the cyst locatio

## (undated) NOTE — LETTER
Patient Name: Jocelyne Schafer  YOB: 1980          MRN number:  [id number]  Date:  12/6/2023  Referring Physician:  Dr. Floyd Weaver    Discharge Summary  Pt has attended 22 visits in Speech Therapy. Dear Dr. Floyd Weaver,  This letter is to inform you of Janis Back progress in speech-language therapy. Since his initial evaluation, Pepe Brown has attended 22 sessions. Therapy sessions have targeted cognitive-communication within the areas of working memory, recall, attention, executive functioning, and reasoning/problem solving. A home exercise program (HEP) addressing use of compensatory strategies (internal and external) has been provided and completed consistently. During this treatment period, Pepe Brown has demonstrated improvement in cognitive-communication skills and has reported carryover to daily living and occupational tasks. As Pepe Brown has demonstrated significant progress and has become independent with implementation of supports/strategies, it is recommended that he be discharged from speech therapy at this time. Thank you for your referral. Please re-consult should further concerns arise. Subjective: Patient arrived on time to session. Patient participated actively in therapeutic tasks. Pain: No pain reported on this date. Objective:  Goals: (to be met in 10 additional visits)   STG 1: Patient will verbalize 4 compensatory memory strategies (processing, working memory, short-term memory, attention, problem-solving) and describe 1 use per strategy given min verbal and visual cues. Progress: Patient demonstrated recall of 4 compensatory memory strategies during session tasks. Goal met. STG 2D: Patient will accurately complete working memory tasks within 95% of opportunities given min verbal and visual cueing. Progress: 95% accuracy given intermittent verbal and visual cues. Goal met.     STG 3C: Patient will utilize accurate planning and note-taking skills within 95% of opportunities given min verbal and visual cueing. Progress: 95% given intermittent verbal and visual cues. Goal met. STG 4D: Patient will accurately complete moderate level complexity verbal reasoning/problem solving/deductive tasks with 90% accuracy given min verbal and visual cueing. Progress: 95% accuracy given intermittent verbal and visual cues. Goal met. STG 5C: Patient will independently divide attention between two moderate level complexity tasks. Progress: Independent. Goal met. The patient was administered the Cognitive Linguistic Quick Test (CLQT) to assess cognitive functions in the following domains: attention, memory, executive functions, language, visuospatial skills, and clock drawing. Discharge (2023): WNL for [age] years old:  Administered the Cognitive Linguistic Quick Test (CLQT). CLQT scores are as follows: Attention: 206 (HBG=549-468); Memory: 178 (LIU=126-106); Executive Functions 32 (WNL=24-40); Language: 35 (WNL=29-30); Visuospatial Skills: 76 (WNL=); Clock Drawin (WNL=12-13). Composite Cognitive Score 3.8 (WNL 3.5-4.0). Subtests:  Personal Facts= 8 (average=8)  Symbol Cancellations= 12 (average=11)  Confrontation Naming= 10 (average=10)  Clock Drawing= 13 (average=12)  Story Retelling= 9 (average=6)  Symbol Trails= 10 (average=9)  Generative Naming= 8 (average=5)  Design Memory= 6 (average=5)  Mazes= 8 (average=7)  Design Generation= 5 (average=6)    Evaluation (2023): WNL for [age] years old:  Administered the Cognitive Linguistic Quick Test (CLQT). CLQT scores are as follows: Attention: 203 (OUH=336-040); Memory: 172 (TTM=826-300); Executive Functions 31 (WNL=24-40); Language: 34 (WNL=29-30); Visuospatial Skills: 75 (WNL=); Clock Drawin (WNL=12-13). Composite Cognitive Score 3.8 (WNL 3.5-4.0).      Subtests:  Personal Facts= 8 (average=8)  Symbol Cancellations= 12 (average=11)  Confrontation Naming= 10 (average=10)  Clock Drawing= 12 (average=12)  Story Retelling= 8 (average=6)  Symbol Trails= 10 (average=9)  Generative Naming= 8 (average=5)  Design Memory= 6 (average=5)  Mazes= 8 (average=7)  Design Generation= 5 (average=6)     Cognitive Function Short Form 8a: This patient reported outcome measure is utilized to determine patients' perceptions of cognitive function and impact on daily life activities. Score: 26/40 (patient scored sometimes/2-3 times for 6/8 questions)  A lower score indicates more severe perception of cognitive function. HEP: Spaced retrieval for recall of pertinent and meaningful occupational and/or daily life information. Education: Educated on use of external cognitive supports, note-taking, and compensatory memory strategies. Assessment: Patient presented to speech therapy with cognitive-communication deficits following brain surgery. Patient's deficits impacted his ability to effectively communicate desired information, ability to effectively recall desired information, and complete daily life, occupational, vocational, and social tasks. Throughout plan of care, patient has demonstrated significant improvement in cognitive-communication skills and has employed internal and external strategies independently. As patient is independent and has demonstrated carryover of skills to non-structured home environments, it is recommended that he be discharged from speech-language therapy services. Plan: Patient to be discharged from speech-language therapy as all goals have been met. Patient/Family/Caregiver was advised of these findings, precautions, and treatment options and has agreed to actively participate in planning and for this course of care. Thank you for your referral. If you have any questions, please contact me at Dept: [phone].     Sincerely,  Electronically signed by therapist: YONG Marie         21st Century Cures Act Notice to Patient: Medical documents like this are made available to patients in the interest of transparency. However, be advised this is a medical document and it is intended as [id number] communication between your medical providers. This medical document may contain abbreviations, assessments, medical data, and results or other terms that are unfamiliar. Medical documents are intended to carry relevant information, facts as evident, and the clinical opinion of the practitioner. As such, this medical document may be written in language that appears blunt or direct. You are encouraged to contact your medical provider and/or [facility] if you have any questions about this medical document.